# Patient Record
Sex: MALE | Race: BLACK OR AFRICAN AMERICAN | HISPANIC OR LATINO | Employment: FULL TIME | URBAN - METROPOLITAN AREA
[De-identification: names, ages, dates, MRNs, and addresses within clinical notes are randomized per-mention and may not be internally consistent; named-entity substitution may affect disease eponyms.]

---

## 2018-04-11 ENCOUNTER — CONVERSION ENCOUNTER (OUTPATIENT)
Dept: FAMILY MEDICINE CLINIC | Facility: CLINIC | Age: 48
End: 2018-04-11

## 2018-04-11 ENCOUNTER — OFFICE VISIT CONVERTED (OUTPATIENT)
Dept: FAMILY MEDICINE CLINIC | Facility: CLINIC | Age: 48
End: 2018-04-11
Attending: NURSE PRACTITIONER

## 2018-04-17 ENCOUNTER — OFFICE VISIT CONVERTED (OUTPATIENT)
Dept: CARDIOLOGY | Facility: CLINIC | Age: 48
End: 2018-04-17
Attending: INTERNAL MEDICINE

## 2018-05-04 ENCOUNTER — CONVERSION ENCOUNTER (OUTPATIENT)
Dept: CARDIOLOGY | Facility: CLINIC | Age: 48
End: 2018-05-04
Attending: INTERNAL MEDICINE

## 2018-05-04 ENCOUNTER — CONVERSION ENCOUNTER (OUTPATIENT)
Dept: CARDIOLOGY | Facility: CLINIC | Age: 48
End: 2018-05-04

## 2018-05-17 ENCOUNTER — OFFICE VISIT CONVERTED (OUTPATIENT)
Dept: PULMONOLOGY | Facility: CLINIC | Age: 48
End: 2018-05-17
Attending: INTERNAL MEDICINE

## 2018-06-07 ENCOUNTER — OFFICE VISIT CONVERTED (OUTPATIENT)
Dept: CARDIOLOGY | Facility: CLINIC | Age: 48
End: 2018-06-07
Attending: INTERNAL MEDICINE

## 2018-07-19 ENCOUNTER — CONVERSION ENCOUNTER (OUTPATIENT)
Dept: CARDIOLOGY | Facility: CLINIC | Age: 48
End: 2018-07-19

## 2018-07-19 ENCOUNTER — OFFICE VISIT CONVERTED (OUTPATIENT)
Dept: CARDIOLOGY | Facility: CLINIC | Age: 48
End: 2018-07-19
Attending: INTERNAL MEDICINE

## 2018-08-09 ENCOUNTER — OFFICE VISIT CONVERTED (OUTPATIENT)
Dept: PULMONOLOGY | Facility: CLINIC | Age: 48
End: 2018-08-09
Attending: INTERNAL MEDICINE

## 2018-08-20 ENCOUNTER — OFFICE VISIT CONVERTED (OUTPATIENT)
Dept: FAMILY MEDICINE CLINIC | Facility: CLINIC | Age: 48
End: 2018-08-20
Attending: NURSE PRACTITIONER

## 2018-09-21 ENCOUNTER — OFFICE VISIT CONVERTED (OUTPATIENT)
Dept: CARDIOLOGY | Facility: CLINIC | Age: 48
End: 2018-09-21
Attending: INTERNAL MEDICINE

## 2018-10-12 ENCOUNTER — CONVERSION ENCOUNTER (OUTPATIENT)
Dept: FAMILY MEDICINE CLINIC | Facility: CLINIC | Age: 48
End: 2018-10-12

## 2018-10-12 ENCOUNTER — OFFICE VISIT CONVERTED (OUTPATIENT)
Dept: FAMILY MEDICINE CLINIC | Facility: CLINIC | Age: 48
End: 2018-10-12
Attending: NURSE PRACTITIONER

## 2018-11-06 ENCOUNTER — CONVERSION ENCOUNTER (OUTPATIENT)
Dept: GASTROENTEROLOGY | Facility: CLINIC | Age: 48
End: 2018-11-06

## 2018-11-06 ENCOUNTER — OFFICE VISIT CONVERTED (OUTPATIENT)
Dept: GASTROENTEROLOGY | Facility: CLINIC | Age: 48
End: 2018-11-06
Attending: NURSE PRACTITIONER

## 2018-11-08 ENCOUNTER — OFFICE VISIT CONVERTED (OUTPATIENT)
Dept: CARDIOLOGY | Facility: CLINIC | Age: 48
End: 2018-11-08
Attending: INTERNAL MEDICINE

## 2018-11-28 ENCOUNTER — OFFICE VISIT CONVERTED (OUTPATIENT)
Dept: PULMONOLOGY | Facility: CLINIC | Age: 48
End: 2018-11-28
Attending: INTERNAL MEDICINE

## 2018-12-11 ENCOUNTER — OFFICE VISIT CONVERTED (OUTPATIENT)
Dept: CARDIOLOGY | Facility: CLINIC | Age: 48
End: 2018-12-11
Attending: INTERNAL MEDICINE

## 2019-01-11 ENCOUNTER — HOSPITAL ENCOUNTER (OUTPATIENT)
Dept: GASTROENTEROLOGY | Facility: HOSPITAL | Age: 49
Setting detail: HOSPITAL OUTPATIENT SURGERY
Discharge: HOME | End: 2019-01-11
Attending: INTERNAL MEDICINE

## 2019-01-11 LAB — GLUCOSE BLD-MCNC: 96 MG/DL (ref 70–99)

## 2019-01-12 ENCOUNTER — HOSPITAL ENCOUNTER (OUTPATIENT)
Dept: OTHER | Facility: HOSPITAL | Age: 49
Discharge: HOME | End: 2019-01-12
Attending: INTERNAL MEDICINE

## 2019-02-08 ENCOUNTER — OFFICE VISIT CONVERTED (OUTPATIENT)
Dept: FAMILY MEDICINE CLINIC | Facility: CLINIC | Age: 49
End: 2019-02-08
Attending: NURSE PRACTITIONER

## 2019-02-11 ENCOUNTER — HOSPITAL ENCOUNTER (OUTPATIENT)
Dept: LAB | Facility: HOSPITAL | Age: 49
Discharge: HOME | End: 2019-02-11
Attending: NURSE PRACTITIONER

## 2019-02-11 LAB
ALBUMIN SERPL-MCNC: 3.7 G/DL (ref 3.5–5)
ALBUMIN/GLOB SERPL: 0.8 {RATIO} (ref 1.4–2.6)
ALP SERPL-CCNC: 66 U/L (ref 53–128)
ALT SERPL-CCNC: 94 U/L (ref 10–40)
ANION GAP SERPL CALC-SCNC: 15 MMOL/L (ref 8–19)
AST SERPL-CCNC: 56 U/L (ref 15–50)
BASOPHILS # BLD AUTO: 0.01 10*3/UL (ref 0–0.2)
BASOPHILS NFR BLD AUTO: 0.17 % (ref 0–3)
BILIRUB SERPL-MCNC: 0.4 MG/DL (ref 0.2–1.3)
BUN SERPL-MCNC: 13 MG/DL (ref 5–25)
BUN/CREAT SERPL: 11 {RATIO} (ref 6–20)
CALCIUM SERPL-MCNC: 9.9 MG/DL (ref 8.7–10.4)
CHLORIDE SERPL-SCNC: 100 MMOL/L (ref 99–111)
CHOLEST SERPL-MCNC: 121 MG/DL (ref 107–200)
CHOLEST/HDLC SERPL: 3.9 {RATIO} (ref 3–6)
CONV CO2: 28 MMOL/L (ref 22–32)
CONV TOTAL PROTEIN: 8.2 G/DL (ref 6.3–8.2)
CREAT UR-MCNC: 1.21 MG/DL (ref 0.7–1.2)
EOSINOPHIL # BLD AUTO: 0.17 10*3/UL (ref 0–0.7)
EOSINOPHIL # BLD AUTO: 3.42 % (ref 0–7)
ERYTHROCYTE [DISTWIDTH] IN BLOOD BY AUTOMATED COUNT: 13.1 % (ref 11.5–14.5)
EST. AVERAGE GLUCOSE BLD GHB EST-MCNC: 140 MG/DL
GFR SERPLBLD BASED ON 1.73 SQ M-ARVRAT: >60 ML/MIN/{1.73_M2}
GLOBULIN UR ELPH-MCNC: 4.5 G/DL (ref 2–3.5)
GLUCOSE SERPL-MCNC: 97 MG/DL (ref 70–99)
HBA1C MFR BLD: 11.1 G/DL (ref 14–18)
HBA1C MFR BLD: 6.5 % (ref 3.5–5.7)
HCT VFR BLD AUTO: 34.9 % (ref 42–52)
HDLC SERPL-MCNC: 31 MG/DL (ref 40–60)
INR PPP: 2.04 (ref 2–3)
IRON SATN MFR SERPL: 14 % (ref 20–55)
IRON SERPL-MCNC: 38 UG/DL (ref 70–180)
LDLC SERPL CALC-MCNC: 72 MG/DL (ref 70–100)
LYMPHOCYTES # BLD AUTO: 1.31 10*3/UL (ref 1–5)
MCH RBC QN AUTO: 22.9 PG (ref 27–31)
MCHC RBC AUTO-ENTMCNC: 31.8 G/DL (ref 33–37)
MCV RBC AUTO: 72.2 FL (ref 80–96)
MONOCYTES # BLD AUTO: 0.53 10*3/UL (ref 0.2–1.2)
MONOCYTES NFR BLD AUTO: 10.8 % (ref 3–10)
NEUTROPHILS # BLD AUTO: 2.87 10*3/UL (ref 2–8)
NEUTROPHILS NFR BLD AUTO: 58.9 % (ref 30–85)
NRBC BLD AUTO-RTO: 0 % (ref 0–0.01)
OSMOLALITY SERPL CALC.SUM OF ELEC: 288 MOSM/KG (ref 273–304)
PLATELET # BLD AUTO: 526 10*3/UL (ref 130–400)
PMV BLD AUTO: 7.5 FL (ref 7.4–10.4)
POTASSIUM SERPL-SCNC: 4.3 MMOL/L (ref 3.5–5.3)
PROTHROMBIN TIME: 19.3 S (ref 9.4–12)
RBC # BLD AUTO: 4.83 10*6/UL (ref 4.7–6.1)
SODIUM SERPL-SCNC: 139 MMOL/L (ref 135–147)
T4 FREE SERPL-MCNC: 1.6 NG/DL (ref 0.9–1.8)
TIBC SERPL-MCNC: 272 UG/DL (ref 245–450)
TRANSFERRIN SERPL-MCNC: 190 MG/DL (ref 215–365)
TRIGL SERPL-MCNC: 89 MG/DL (ref 40–150)
TSH SERPL-ACNC: 1.07 M[IU]/L (ref 0.27–4.2)
VARIANT LYMPHS NFR BLD MANUAL: 26.7 % (ref 20–45)
VLDLC SERPL-MCNC: 18 MG/DL (ref 5–37)
WBC # BLD AUTO: 4.88 10*3/UL (ref 4.8–10.8)

## 2019-02-14 ENCOUNTER — OFFICE VISIT CONVERTED (OUTPATIENT)
Dept: CARDIOLOGY | Facility: CLINIC | Age: 49
End: 2019-02-14
Attending: INTERNAL MEDICINE

## 2019-02-19 ENCOUNTER — HOSPITAL ENCOUNTER (OUTPATIENT)
Dept: LAB | Facility: HOSPITAL | Age: 49
Discharge: HOME | End: 2019-02-19
Attending: NURSE PRACTITIONER

## 2019-02-19 ENCOUNTER — OFFICE VISIT CONVERTED (OUTPATIENT)
Dept: GASTROENTEROLOGY | Facility: CLINIC | Age: 49
End: 2019-02-19
Attending: NURSE PRACTITIONER

## 2019-02-19 LAB
ALBUMIN SERPL-MCNC: 3.8 G/DL (ref 3.5–5)
ALP SERPL-CCNC: 62 U/L (ref 53–128)
ALT SERPL-CCNC: 43 U/L (ref 10–40)
AST SERPL-CCNC: 26 U/L (ref 15–50)
BILIRUB SERPL-MCNC: 0.48 MG/DL (ref 0.2–1.3)
CONV BILI, CONJUGATED: <0.2 MG/DL (ref 0–0.6)
CONV TOTAL PROTEIN: 8.1 G/DL (ref 6.3–8.2)
CONV UNCONJUGATED BILIRUBIN: 0.3 MG/DL (ref 0–1.1)

## 2019-02-21 LAB — CONV CELIAC DISEASE AB-IGA: 445 MG/DL (ref 68–408)

## 2019-02-22 LAB — TTG IGA SER-ACNC: 1 U/ML (ref 0–3)

## 2019-02-28 ENCOUNTER — PROCEDURE VISIT CONVERTED (OUTPATIENT)
Dept: GASTROENTEROLOGY | Facility: CLINIC | Age: 49
End: 2019-02-28
Attending: NURSE PRACTITIONER

## 2019-03-12 ENCOUNTER — HOSPITAL ENCOUNTER (OUTPATIENT)
Dept: LAB | Facility: HOSPITAL | Age: 49
Discharge: HOME | End: 2019-03-12
Attending: NURSE PRACTITIONER

## 2019-03-12 LAB
ALBUMIN SERPL-MCNC: 3.8 G/DL (ref 3.5–5)
ALBUMIN/GLOB SERPL: 1 {RATIO} (ref 1.4–2.6)
ALP SERPL-CCNC: 60 U/L (ref 53–128)
ALT SERPL-CCNC: 33 U/L (ref 10–40)
ANION GAP SERPL CALC-SCNC: 13 MMOL/L (ref 8–19)
AST SERPL-CCNC: 24 U/L (ref 15–50)
BILIRUB SERPL-MCNC: 0.43 MG/DL (ref 0.2–1.3)
BUN SERPL-MCNC: 10 MG/DL (ref 5–25)
BUN/CREAT SERPL: 9 {RATIO} (ref 6–20)
CALCIUM SERPL-MCNC: 9.1 MG/DL (ref 8.7–10.4)
CHLORIDE SERPL-SCNC: 101 MMOL/L (ref 99–111)
CONV CO2: 28 MMOL/L (ref 22–32)
CONV TOTAL PROTEIN: 7.5 G/DL (ref 6.3–8.2)
CREAT UR-MCNC: 1.13 MG/DL (ref 0.7–1.2)
GFR SERPLBLD BASED ON 1.73 SQ M-ARVRAT: >60 ML/MIN/{1.73_M2}
GLOBULIN UR ELPH-MCNC: 3.7 G/DL (ref 2–3.5)
GLUCOSE SERPL-MCNC: 124 MG/DL (ref 70–99)
INR PPP: 1.33 (ref 2–3)
OSMOLALITY SERPL CALC.SUM OF ELEC: 288 MOSM/KG (ref 273–304)
POTASSIUM SERPL-SCNC: 3.4 MMOL/L (ref 3.5–5.3)
PROTHROMBIN TIME: 13.2 S (ref 9.4–12)
SODIUM SERPL-SCNC: 139 MMOL/L (ref 135–147)

## 2019-03-14 ENCOUNTER — HOSPITAL ENCOUNTER (OUTPATIENT)
Dept: CARDIAC REHAB | Facility: HOSPITAL | Age: 49
Setting detail: RECURRING SERIES
Discharge: HOME | End: 2019-07-10
Attending: INTERNAL MEDICINE

## 2019-03-14 ENCOUNTER — HOSPITAL ENCOUNTER (OUTPATIENT)
Dept: INFUSION THERAPY | Facility: HOSPITAL | Age: 49
Discharge: HOME | End: 2019-03-14
Attending: INTERNAL MEDICINE

## 2019-03-15 ENCOUNTER — HOSPITAL ENCOUNTER (OUTPATIENT)
Dept: OTHER | Facility: HOSPITAL | Age: 49
Discharge: HOME | End: 2019-03-15
Attending: INTERNAL MEDICINE

## 2019-03-15 ENCOUNTER — HOSPITAL ENCOUNTER (OUTPATIENT)
Dept: INFUSION THERAPY | Facility: HOSPITAL | Age: 49
Setting detail: RECURRING SERIES
Discharge: HOME | End: 2019-03-31
Attending: SPECIALIST

## 2019-03-15 LAB
INR PPP: 1.5 (ref 2–3)
PROTHROMBIN TIME: 14.7 S (ref 9.4–12)

## 2019-03-18 ENCOUNTER — HOSPITAL ENCOUNTER (OUTPATIENT)
Dept: LAB | Facility: HOSPITAL | Age: 49
Discharge: HOME | End: 2019-03-18
Attending: INTERNAL MEDICINE

## 2019-03-18 LAB
INR PPP: 1.79 (ref 2–3)
PROTHROMBIN TIME: 17.2 S (ref 9.4–12)

## 2019-03-21 ENCOUNTER — HOSPITAL ENCOUNTER (OUTPATIENT)
Dept: LAB | Facility: HOSPITAL | Age: 49
Discharge: HOME | End: 2019-03-21
Attending: INTERNAL MEDICINE

## 2019-03-21 LAB
INR PPP: 2.25 (ref 2–3)
PROTHROMBIN TIME: 21.2 S (ref 9.4–12)

## 2019-03-28 ENCOUNTER — CONVERSION ENCOUNTER (OUTPATIENT)
Dept: CARDIOLOGY | Facility: CLINIC | Age: 49
End: 2019-03-28

## 2019-03-28 ENCOUNTER — OFFICE VISIT CONVERTED (OUTPATIENT)
Dept: CARDIOLOGY | Facility: CLINIC | Age: 49
End: 2019-03-28
Attending: INTERNAL MEDICINE

## 2019-03-28 ENCOUNTER — HOSPITAL ENCOUNTER (OUTPATIENT)
Dept: LAB | Facility: HOSPITAL | Age: 49
Discharge: HOME | End: 2019-03-28
Attending: INTERNAL MEDICINE

## 2019-03-28 LAB
INR PPP: 1.16 (ref 2–3)
PROTHROMBIN TIME: 11.7 S (ref 9.4–12)

## 2019-04-01 ENCOUNTER — HOSPITAL ENCOUNTER (OUTPATIENT)
Dept: LAB | Facility: HOSPITAL | Age: 49
Discharge: HOME | End: 2019-04-01
Attending: INTERNAL MEDICINE

## 2019-04-01 LAB
INR PPP: 2.2 (ref 2–3)
PROTHROMBIN TIME: 20.7 S (ref 9.4–12)

## 2019-04-05 ENCOUNTER — HOSPITAL ENCOUNTER (OUTPATIENT)
Dept: LAB | Facility: HOSPITAL | Age: 49
Discharge: HOME | End: 2019-04-05
Attending: INTERNAL MEDICINE

## 2019-04-05 LAB
INR PPP: 2.67 (ref 2–3)
PROTHROMBIN TIME: 24.9 S (ref 9.4–12)

## 2019-04-12 ENCOUNTER — HOSPITAL ENCOUNTER (OUTPATIENT)
Dept: LAB | Facility: HOSPITAL | Age: 49
Discharge: HOME | End: 2019-04-12
Attending: INTERNAL MEDICINE

## 2019-04-12 LAB
ANION GAP SERPL CALC-SCNC: 17 MMOL/L (ref 8–19)
BUN SERPL-MCNC: 11 MG/DL (ref 5–25)
BUN/CREAT SERPL: 9 {RATIO} (ref 6–20)
CALCIUM SERPL-MCNC: 8.9 MG/DL (ref 8.7–10.4)
CHLORIDE SERPL-SCNC: 105 MMOL/L (ref 99–111)
CONV CO2: 23 MMOL/L (ref 22–32)
CREAT UR-MCNC: 1.17 MG/DL (ref 0.7–1.2)
GFR SERPLBLD BASED ON 1.73 SQ M-ARVRAT: >60 ML/MIN/{1.73_M2}
GLUCOSE SERPL-MCNC: 95 MG/DL (ref 70–99)
INR PPP: 2.94 (ref 2–3)
OSMOLALITY SERPL CALC.SUM OF ELEC: 291 MOSM/KG (ref 273–304)
POTASSIUM SERPL-SCNC: 3.6 MMOL/L (ref 3.5–5.3)
PROTHROMBIN TIME: 27.3 S (ref 9.4–12)
SODIUM SERPL-SCNC: 141 MMOL/L (ref 135–147)

## 2019-04-26 ENCOUNTER — HOSPITAL ENCOUNTER (OUTPATIENT)
Dept: LAB | Facility: HOSPITAL | Age: 49
Discharge: HOME | End: 2019-04-26
Attending: INTERNAL MEDICINE

## 2019-04-26 ENCOUNTER — CONVERSION ENCOUNTER (OUTPATIENT)
Dept: FAMILY MEDICINE CLINIC | Facility: CLINIC | Age: 49
End: 2019-04-26

## 2019-04-26 ENCOUNTER — OFFICE VISIT CONVERTED (OUTPATIENT)
Dept: FAMILY MEDICINE CLINIC | Facility: CLINIC | Age: 49
End: 2019-04-26
Attending: NURSE PRACTITIONER

## 2019-04-26 LAB
INR PPP: 2.85 (ref 2–3)
PROTHROMBIN TIME: 26.5 S (ref 9.4–12)

## 2019-05-13 ENCOUNTER — HOSPITAL ENCOUNTER (OUTPATIENT)
Dept: LAB | Facility: HOSPITAL | Age: 49
Discharge: HOME | End: 2019-05-13
Attending: INTERNAL MEDICINE

## 2019-05-13 LAB
INR PPP: 2.44 (ref 2–3)
PROTHROMBIN TIME: 22.9 S (ref 9.4–12)

## 2019-05-16 ENCOUNTER — OFFICE VISIT CONVERTED (OUTPATIENT)
Dept: OTOLARYNGOLOGY | Facility: CLINIC | Age: 49
End: 2019-05-16
Attending: OTOLARYNGOLOGY

## 2019-05-29 ENCOUNTER — HOSPITAL ENCOUNTER (OUTPATIENT)
Dept: LAB | Facility: HOSPITAL | Age: 49
Discharge: HOME | End: 2019-05-29
Attending: INTERNAL MEDICINE

## 2019-05-29 LAB
INR PPP: 3.27 (ref 2–3)
PROTHROMBIN TIME: 30.4 S (ref 9.4–12)

## 2019-06-14 ENCOUNTER — HOSPITAL ENCOUNTER (OUTPATIENT)
Dept: LAB | Facility: HOSPITAL | Age: 49
Discharge: HOME | End: 2019-06-14
Attending: INTERNAL MEDICINE

## 2019-06-14 LAB
INR PPP: 3.85 (ref 2–3)
PROTHROMBIN TIME: 35.7 S (ref 9.4–12)

## 2019-06-28 ENCOUNTER — HOSPITAL ENCOUNTER (OUTPATIENT)
Dept: LAB | Facility: HOSPITAL | Age: 49
Discharge: HOME | End: 2019-06-28
Attending: NURSE PRACTITIONER

## 2019-06-28 LAB
BASOPHILS # BLD AUTO: 0.02 10*3/UL (ref 0–0.2)
BASOPHILS NFR BLD AUTO: 0.4 % (ref 0–3)
CONV ABS IMM GRAN: 0.01 10*3/UL (ref 0–0.2)
CONV IMMATURE GRAN: 0.2 % (ref 0–1.8)
DEPRECATED RDW RBC AUTO: 43 FL (ref 35.1–43.9)
EOSINOPHIL # BLD AUTO: 0.08 10*3/UL (ref 0–0.7)
EOSINOPHIL # BLD AUTO: 1.6 % (ref 0–7)
ERYTHROCYTE [DISTWIDTH] IN BLOOD BY AUTOMATED COUNT: 17.4 % (ref 11.6–14.4)
FERRITIN SERPL-MCNC: 47 NG/ML (ref 30–300)
HBA1C MFR BLD: 12.9 G/DL (ref 14–18)
HCT VFR BLD AUTO: 44.6 % (ref 42–52)
INR PPP: 3.34 (ref 2–3)
IRON SATN MFR SERPL: 19 % (ref 20–55)
IRON SERPL-MCNC: 68 UG/DL (ref 70–180)
LYMPHOCYTES # BLD AUTO: 1.85 10*3/UL (ref 1–5)
MCH RBC QN AUTO: 21.1 PG (ref 27–31)
MCHC RBC AUTO-ENTMCNC: 28.9 G/DL (ref 33–37)
MCV RBC AUTO: 73.1 FL (ref 80–96)
MONOCYTES # BLD AUTO: 0.79 10*3/UL (ref 0.2–1.2)
MONOCYTES NFR BLD AUTO: 15.9 % (ref 3–10)
NEUTROPHILS # BLD AUTO: 2.23 10*3/UL (ref 2–8)
NEUTROPHILS NFR BLD AUTO: 44.8 % (ref 30–85)
NRBC CBCN: 0 % (ref 0–0.7)
PLATELET # BLD AUTO: 341 10*3/UL (ref 130–400)
PMV BLD AUTO: 10.2 FL (ref 9.4–12.4)
PROTHROMBIN TIME: 31 S (ref 9.4–12)
RBC # BLD AUTO: 6.1 10*6/UL (ref 4.7–6.1)
TIBC SERPL-MCNC: 350 UG/DL (ref 245–450)
TRANSFERRIN SERPL-MCNC: 245 MG/DL (ref 215–365)
VARIANT LYMPHS NFR BLD MANUAL: 37.1 % (ref 20–45)
WBC # BLD AUTO: 4.98 10*3/UL (ref 4.8–10.8)

## 2019-07-01 ENCOUNTER — OFFICE VISIT CONVERTED (OUTPATIENT)
Dept: GASTROENTEROLOGY | Facility: CLINIC | Age: 49
End: 2019-07-01
Attending: NURSE PRACTITIONER

## 2019-07-12 ENCOUNTER — HOSPITAL ENCOUNTER (OUTPATIENT)
Dept: LAB | Facility: HOSPITAL | Age: 49
Discharge: HOME | End: 2019-07-12
Attending: INTERNAL MEDICINE

## 2019-07-12 LAB
INR PPP: 3.17 (ref 2–3)
PROTHROMBIN TIME: 29.4 S (ref 9.4–12)

## 2019-08-16 ENCOUNTER — HOSPITAL ENCOUNTER (OUTPATIENT)
Dept: LAB | Facility: HOSPITAL | Age: 49
Discharge: HOME | End: 2019-08-16
Attending: INTERNAL MEDICINE

## 2019-08-16 LAB
INR PPP: 2.62 (ref 2–3)
PROTHROMBIN TIME: 24.5 S (ref 9.4–12)

## 2019-08-28 ENCOUNTER — OFFICE VISIT CONVERTED (OUTPATIENT)
Dept: CARDIOLOGY | Facility: CLINIC | Age: 49
End: 2019-08-28
Attending: INTERNAL MEDICINE

## 2019-08-28 ENCOUNTER — CONVERSION ENCOUNTER (OUTPATIENT)
Dept: CARDIOLOGY | Facility: CLINIC | Age: 49
End: 2019-08-28

## 2019-08-30 ENCOUNTER — HOSPITAL ENCOUNTER (OUTPATIENT)
Dept: LAB | Facility: HOSPITAL | Age: 49
Discharge: HOME | End: 2019-08-30
Attending: INTERNAL MEDICINE

## 2019-08-30 LAB
INR PPP: 4.27 (ref 2–3)
PROTHROMBIN TIME: 39.5 S (ref 9.4–12)

## 2019-09-06 ENCOUNTER — HOSPITAL ENCOUNTER (OUTPATIENT)
Dept: LAB | Facility: HOSPITAL | Age: 49
Discharge: HOME | End: 2019-09-06
Attending: INTERNAL MEDICINE

## 2019-09-06 LAB
INR PPP: 2.78 (ref 2–3)
PROTHROMBIN TIME: 25.9 S (ref 9.4–12)

## 2019-09-20 ENCOUNTER — HOSPITAL ENCOUNTER (OUTPATIENT)
Dept: LAB | Facility: HOSPITAL | Age: 49
Discharge: HOME | End: 2019-09-20
Attending: INTERNAL MEDICINE

## 2019-09-20 LAB
INR PPP: 2.91 (ref 2–3)
PROTHROMBIN TIME: 27.1 S (ref 9.4–12)

## 2019-10-07 ENCOUNTER — OFFICE VISIT CONVERTED (OUTPATIENT)
Dept: FAMILY MEDICINE CLINIC | Facility: CLINIC | Age: 49
End: 2019-10-07
Attending: NURSE PRACTITIONER

## 2019-10-07 ENCOUNTER — CONVERSION ENCOUNTER (OUTPATIENT)
Dept: FAMILY MEDICINE CLINIC | Facility: CLINIC | Age: 49
End: 2019-10-07

## 2019-10-11 ENCOUNTER — HOSPITAL ENCOUNTER (OUTPATIENT)
Dept: LAB | Facility: HOSPITAL | Age: 49
Discharge: HOME | End: 2019-10-11
Attending: INTERNAL MEDICINE

## 2019-10-11 LAB
INR PPP: 4 (ref 2–3)
PROTHROMBIN TIME: 38.8 S (ref 9.4–12)

## 2019-10-15 ENCOUNTER — HOSPITAL ENCOUNTER (OUTPATIENT)
Dept: GENERAL RADIOLOGY | Facility: HOSPITAL | Age: 49
Discharge: HOME | End: 2019-10-15
Attending: NURSE PRACTITIONER

## 2019-10-28 ENCOUNTER — OFFICE VISIT CONVERTED (OUTPATIENT)
Dept: FAMILY MEDICINE CLINIC | Facility: CLINIC | Age: 49
End: 2019-10-28
Attending: NURSE PRACTITIONER

## 2019-11-01 ENCOUNTER — HOSPITAL ENCOUNTER (OUTPATIENT)
Dept: LAB | Facility: HOSPITAL | Age: 49
Discharge: HOME | End: 2019-11-01
Attending: INTERNAL MEDICINE

## 2019-11-01 LAB
EST. AVERAGE GLUCOSE BLD GHB EST-MCNC: 131 MG/DL
HBA1C MFR BLD: 6.2 % (ref 3.5–5.7)
INR PPP: 2.41 (ref 2–3)
PROTHROMBIN TIME: 23.6 S (ref 9.4–12)

## 2019-11-20 ENCOUNTER — HOSPITAL ENCOUNTER (OUTPATIENT)
Dept: PHYSICAL THERAPY | Facility: CLINIC | Age: 49
Setting detail: RECURRING SERIES
Discharge: STILL ADMITTED OR ADMINISTRATIVE | End: 2020-03-04
Attending: NURSE PRACTITIONER

## 2019-11-20 ENCOUNTER — HOSPITAL ENCOUNTER (OUTPATIENT)
Dept: GENERAL RADIOLOGY | Facility: HOSPITAL | Age: 49
Discharge: HOME | End: 2019-11-20
Attending: PHYSICIAN ASSISTANT

## 2019-11-20 ENCOUNTER — OFFICE VISIT CONVERTED (OUTPATIENT)
Dept: NEUROSURGERY | Facility: CLINIC | Age: 49
End: 2019-11-20
Attending: PHYSICIAN ASSISTANT

## 2019-12-18 ENCOUNTER — OFFICE VISIT CONVERTED (OUTPATIENT)
Dept: PULMONOLOGY | Facility: CLINIC | Age: 49
End: 2019-12-18
Attending: NURSE PRACTITIONER

## 2019-12-20 ENCOUNTER — HOSPITAL ENCOUNTER (OUTPATIENT)
Dept: GENERAL RADIOLOGY | Facility: HOSPITAL | Age: 49
Discharge: HOME | End: 2019-12-20
Attending: NURSE PRACTITIONER

## 2019-12-20 ENCOUNTER — HOSPITAL ENCOUNTER (OUTPATIENT)
Dept: LAB | Facility: HOSPITAL | Age: 49
Discharge: HOME | End: 2019-12-20
Attending: INTERNAL MEDICINE

## 2019-12-20 LAB
INR PPP: 2.34 (ref 2–3)
PROTHROMBIN TIME: 23 S (ref 9.4–12)

## 2020-01-24 ENCOUNTER — HOSPITAL ENCOUNTER (OUTPATIENT)
Dept: LAB | Facility: HOSPITAL | Age: 50
Discharge: HOME | End: 2020-01-24
Attending: INTERNAL MEDICINE

## 2020-01-24 LAB
INR PPP: 2.28 (ref 2–3)
PROTHROMBIN TIME: 22.4 S (ref 9.4–12)

## 2020-02-03 ENCOUNTER — HOSPITAL ENCOUNTER (OUTPATIENT)
Dept: LAB | Facility: HOSPITAL | Age: 50
Discharge: HOME | End: 2020-02-03
Attending: INTERNAL MEDICINE

## 2020-02-03 LAB
INR PPP: 2.7 (ref 2–3)
PROTHROMBIN TIME: 26.3 S (ref 9.4–12)

## 2020-02-19 ENCOUNTER — HOSPITAL ENCOUNTER (OUTPATIENT)
Dept: LAB | Facility: HOSPITAL | Age: 50
Discharge: HOME | End: 2020-02-19
Attending: INTERNAL MEDICINE

## 2020-02-19 LAB
INR PPP: 2.97 (ref 2–3)
PROTHROMBIN TIME: 28.8 S (ref 9.4–12)

## 2020-02-26 ENCOUNTER — OFFICE VISIT CONVERTED (OUTPATIENT)
Dept: NEUROSURGERY | Facility: CLINIC | Age: 50
End: 2020-02-26
Attending: PHYSICIAN ASSISTANT

## 2020-03-03 ENCOUNTER — HOSPITAL ENCOUNTER (OUTPATIENT)
Dept: LAB | Facility: HOSPITAL | Age: 50
Discharge: HOME | End: 2020-03-03
Attending: INTERNAL MEDICINE

## 2020-03-03 LAB
INR PPP: 1.05 (ref 2–3)
PROTHROMBIN TIME: 11.3 S (ref 9.4–12)

## 2020-03-06 ENCOUNTER — HOSPITAL ENCOUNTER (OUTPATIENT)
Dept: LAB | Facility: HOSPITAL | Age: 50
Discharge: HOME | End: 2020-03-06
Attending: INTERNAL MEDICINE

## 2020-03-06 LAB
INR PPP: 1.71 (ref 2–3)
PROTHROMBIN TIME: 17.1 S (ref 9.4–12)

## 2020-03-09 ENCOUNTER — HOSPITAL ENCOUNTER (OUTPATIENT)
Dept: LAB | Facility: HOSPITAL | Age: 50
Discharge: HOME | End: 2020-03-09
Attending: INTERNAL MEDICINE

## 2020-03-09 LAB
INR PPP: 3.42 (ref 2–3)
PROTHROMBIN TIME: 33.1 S (ref 9.4–12)

## 2020-03-18 ENCOUNTER — HOSPITAL ENCOUNTER (OUTPATIENT)
Dept: PHYSICAL THERAPY | Facility: CLINIC | Age: 50
Setting detail: RECURRING SERIES
Discharge: HOME | End: 2020-07-08
Attending: NURSE PRACTITIONER

## 2020-03-19 ENCOUNTER — TELEMEDICINE CONVERTED (OUTPATIENT)
Dept: CARDIOLOGY | Facility: CLINIC | Age: 50
End: 2020-03-19
Attending: INTERNAL MEDICINE

## 2020-03-23 ENCOUNTER — HOSPITAL ENCOUNTER (OUTPATIENT)
Dept: LAB | Facility: HOSPITAL | Age: 50
Discharge: HOME | End: 2020-03-23
Attending: INTERNAL MEDICINE

## 2020-03-23 LAB
INR PPP: 4.05 (ref 2–3)
PROTHROMBIN TIME: 39.3 S (ref 9.4–12)

## 2020-04-07 ENCOUNTER — OFFICE VISIT CONVERTED (OUTPATIENT)
Dept: FAMILY MEDICINE CLINIC | Facility: CLINIC | Age: 50
End: 2020-04-07
Attending: NURSE PRACTITIONER

## 2020-04-07 ENCOUNTER — HOSPITAL ENCOUNTER (OUTPATIENT)
Dept: LAB | Facility: HOSPITAL | Age: 50
Discharge: HOME | End: 2020-04-07
Attending: INTERNAL MEDICINE

## 2020-04-07 LAB
INR PPP: 4.78 (ref 2–3)
PROTHROMBIN TIME: 46.4 S (ref 9.4–12)

## 2020-04-17 ENCOUNTER — HOSPITAL ENCOUNTER (OUTPATIENT)
Dept: LAB | Facility: HOSPITAL | Age: 50
Discharge: HOME | End: 2020-04-17
Attending: INTERNAL MEDICINE

## 2020-04-17 LAB
INR PPP: 2.99 (ref 2–3)
PROTHROMBIN TIME: 29.1 S (ref 9.4–12)

## 2020-04-28 ENCOUNTER — OFFICE VISIT CONVERTED (OUTPATIENT)
Dept: FAMILY MEDICINE CLINIC | Facility: CLINIC | Age: 50
End: 2020-04-28
Attending: NURSE PRACTITIONER

## 2020-05-18 ENCOUNTER — HOSPITAL ENCOUNTER (OUTPATIENT)
Dept: LAB | Facility: HOSPITAL | Age: 50
Discharge: HOME | End: 2020-05-18
Attending: NURSE PRACTITIONER

## 2020-05-18 LAB
ALBUMIN SERPL-MCNC: 4 G/DL (ref 3.5–5)
ALBUMIN/GLOB SERPL: 1.2 {RATIO} (ref 1.4–2.6)
ALP SERPL-CCNC: 43 U/L (ref 53–128)
ALT SERPL-CCNC: 18 U/L (ref 10–40)
ANION GAP SERPL CALC-SCNC: 13 MMOL/L (ref 8–19)
AST SERPL-CCNC: 31 U/L (ref 15–50)
BILIRUB SERPL-MCNC: 0.66 MG/DL (ref 0.2–1.3)
BUN SERPL-MCNC: 7 MG/DL (ref 5–25)
BUN/CREAT SERPL: 6 {RATIO} (ref 6–20)
CALCIUM SERPL-MCNC: 8.8 MG/DL (ref 8.7–10.4)
CHLORIDE SERPL-SCNC: 105 MMOL/L (ref 99–111)
CHOLEST SERPL-MCNC: 177 MG/DL (ref 107–200)
CHOLEST/HDLC SERPL: 4.3 {RATIO} (ref 3–6)
CONV CO2: 21 MMOL/L (ref 22–32)
CONV CREATININE URINE, RANDOM: 283 MG/DL (ref 10–300)
CONV MICROALBUM.,U,RANDOM: <12 MG/L (ref 0–20)
CONV TOTAL PROTEIN: 7.3 G/DL (ref 6.3–8.2)
CREAT UR-MCNC: 1.12 MG/DL (ref 0.7–1.2)
EST. AVERAGE GLUCOSE BLD GHB EST-MCNC: 134 MG/DL
GFR SERPLBLD BASED ON 1.73 SQ M-ARVRAT: >60 ML/MIN/{1.73_M2}
GLOBULIN UR ELPH-MCNC: 3.3 G/DL (ref 2–3.5)
GLUCOSE SERPL-MCNC: 97 MG/DL (ref 70–99)
HBA1C MFR BLD: 6.3 % (ref 3.5–5.7)
HDLC SERPL-MCNC: 41 MG/DL (ref 40–60)
INR PPP: 2.74 (ref 2–3)
LDLC SERPL CALC-MCNC: 118 MG/DL (ref 70–100)
MICROALBUMIN/CREAT UR: 4.2 MG/G{CRE} (ref 0–25)
OSMOLALITY SERPL CALC.SUM OF ELEC: 278 MOSM/KG (ref 273–304)
POTASSIUM SERPL-SCNC: 3.9 MMOL/L (ref 3.5–5.3)
PROTHROMBIN TIME: 26.7 S (ref 9.4–12)
SODIUM SERPL-SCNC: 135 MMOL/L (ref 135–147)
TRIGL SERPL-MCNC: 90 MG/DL (ref 40–150)
VLDLC SERPL-MCNC: 18 MG/DL (ref 5–37)

## 2020-06-22 ENCOUNTER — HOSPITAL ENCOUNTER (OUTPATIENT)
Dept: GENERAL RADIOLOGY | Facility: HOSPITAL | Age: 50
Discharge: HOME | End: 2020-06-22
Attending: NURSE PRACTITIONER

## 2020-07-02 ENCOUNTER — HOSPITAL ENCOUNTER (OUTPATIENT)
Dept: LAB | Facility: HOSPITAL | Age: 50
Discharge: HOME | End: 2020-07-02
Attending: INTERNAL MEDICINE

## 2020-07-02 LAB
INR PPP: 2.02 (ref 2–3)
PROTHROMBIN TIME: 20 S (ref 9.4–12)

## 2020-07-16 ENCOUNTER — HOSPITAL ENCOUNTER (OUTPATIENT)
Dept: LAB | Facility: HOSPITAL | Age: 50
Discharge: HOME | End: 2020-07-16
Attending: INTERNAL MEDICINE

## 2020-07-16 LAB
INR PPP: 3.5 (ref 2–3)
PROTHROMBIN TIME: 33.9 S (ref 9.4–12)

## 2020-08-17 ENCOUNTER — HOSPITAL ENCOUNTER (OUTPATIENT)
Dept: LAB | Facility: HOSPITAL | Age: 50
Discharge: HOME | End: 2020-08-17
Attending: INTERNAL MEDICINE

## 2020-08-17 LAB
INR PPP: 2.82 (ref 2–3)
PROTHROMBIN TIME: 27.5 S (ref 9.4–12)

## 2020-10-14 ENCOUNTER — CONVERSION ENCOUNTER (OUTPATIENT)
Dept: CARDIOLOGY | Facility: CLINIC | Age: 50
End: 2020-10-14
Attending: INTERNAL MEDICINE

## 2020-10-21 ENCOUNTER — OFFICE VISIT CONVERTED (OUTPATIENT)
Dept: CARDIOLOGY | Facility: CLINIC | Age: 50
End: 2020-10-21
Attending: INTERNAL MEDICINE

## 2020-10-21 ENCOUNTER — CONVERSION ENCOUNTER (OUTPATIENT)
Dept: CARDIOLOGY | Facility: CLINIC | Age: 50
End: 2020-10-21

## 2020-10-23 ENCOUNTER — HOSPITAL ENCOUNTER (OUTPATIENT)
Dept: LAB | Facility: HOSPITAL | Age: 50
Discharge: HOME | End: 2020-10-23
Attending: INTERNAL MEDICINE

## 2020-10-23 LAB
INR PPP: 2.37 (ref 2–3)
PROTHROMBIN TIME: 23.2 S (ref 9.4–12)

## 2020-10-26 ENCOUNTER — TRANSCRIBE ORDERS (OUTPATIENT)
Dept: CARDIOLOGY | Facility: CLINIC | Age: 50
End: 2020-10-26

## 2020-10-26 DIAGNOSIS — I42.1 CARDIOMYOPATHY, HYPERTROPHIC OBSTRUCTIVE (HCC): Primary | ICD-10-CM

## 2020-11-04 ENCOUNTER — OFFICE VISIT CONVERTED (OUTPATIENT)
Dept: FAMILY MEDICINE CLINIC | Facility: CLINIC | Age: 50
End: 2020-11-04
Attending: NURSE PRACTITIONER

## 2020-11-10 ENCOUNTER — HOSPITAL ENCOUNTER (OUTPATIENT)
Dept: MRI IMAGING | Facility: HOSPITAL | Age: 50
Discharge: HOME | End: 2020-11-10
Admitting: INTERNAL MEDICINE

## 2020-11-10 DIAGNOSIS — I42.1 CARDIOMYOPATHY, HYPERTROPHIC OBSTRUCTIVE (HCC): ICD-10-CM

## 2020-11-10 LAB — CREAT BLDA-MCNC: 1.1 MG/DL (ref 0.6–1.3)

## 2020-11-10 PROCEDURE — A9577 INJ MULTIHANCE: HCPCS | Performed by: INTERNAL MEDICINE

## 2020-11-10 PROCEDURE — 82565 ASSAY OF CREATININE: CPT

## 2020-11-10 PROCEDURE — 75561 CARDIAC MRI FOR MORPH W/DYE: CPT

## 2020-11-10 PROCEDURE — 75561 CARDIAC MRI FOR MORPH W/DYE: CPT | Performed by: INTERNAL MEDICINE

## 2020-11-10 PROCEDURE — 0 GADOBENATE DIMEGLUMINE 529 MG/ML SOLUTION: Performed by: INTERNAL MEDICINE

## 2020-11-10 RX ADMIN — GADOBENATE DIMEGLUMINE 20 ML: 529 INJECTION, SOLUTION INTRAVENOUS at 10:10

## 2020-11-19 ENCOUNTER — HOSPITAL ENCOUNTER (OUTPATIENT)
Dept: URGENT CARE | Facility: CLINIC | Age: 50
Discharge: HOME | End: 2020-11-19
Attending: PHYSICIAN ASSISTANT

## 2020-11-22 LAB — SARS-COV-2 RNA SPEC QL NAA+PROBE: DETECTED

## 2020-11-24 ENCOUNTER — HOSPITAL ENCOUNTER (OUTPATIENT)
Dept: LAB | Facility: HOSPITAL | Age: 50
Discharge: HOME | End: 2020-11-24
Attending: INTERNAL MEDICINE

## 2020-11-24 LAB
ALBUMIN SERPL-MCNC: 4.4 G/DL (ref 3.5–5)
ALBUMIN/GLOB SERPL: 1.2 {RATIO} (ref 1.4–2.6)
ALP SERPL-CCNC: 42 U/L (ref 53–128)
ALT SERPL-CCNC: 101 U/L (ref 10–40)
ANION GAP SERPL CALC-SCNC: 20 MMOL/L (ref 8–19)
AST SERPL-CCNC: 115 U/L (ref 15–50)
BILIRUB SERPL-MCNC: 0.76 MG/DL (ref 0.2–1.3)
BUN SERPL-MCNC: 14 MG/DL (ref 5–25)
BUN/CREAT SERPL: 11 {RATIO} (ref 6–20)
CALCIUM SERPL-MCNC: 9.3 MG/DL (ref 8.7–10.4)
CHLORIDE SERPL-SCNC: 97 MMOL/L (ref 99–111)
CHOLEST SERPL-MCNC: 134 MG/DL (ref 107–200)
CHOLEST/HDLC SERPL: 3.7 {RATIO} (ref 3–6)
CONV CO2: 24 MMOL/L (ref 22–32)
CONV CREATININE URINE, RANDOM: 372.5 MG/DL (ref 10–300)
CONV MICROALBUM.,U,RANDOM: 16.8 MG/L (ref 0–20)
CONV TOTAL PROTEIN: 8.1 G/DL (ref 6.3–8.2)
CREAT UR-MCNC: 1.28 MG/DL (ref 0.7–1.2)
EST. AVERAGE GLUCOSE BLD GHB EST-MCNC: 157 MG/DL
GFR SERPLBLD BASED ON 1.73 SQ M-ARVRAT: >60 ML/MIN/{1.73_M2}
GLOBULIN UR ELPH-MCNC: 3.7 G/DL (ref 2–3.5)
GLUCOSE SERPL-MCNC: 97 MG/DL (ref 70–99)
HBA1C MFR BLD: 7.1 % (ref 3.5–5.7)
HDLC SERPL-MCNC: 36 MG/DL (ref 40–60)
INR PPP: 2.69 (ref 2–3)
LDLC SERPL CALC-MCNC: 80 MG/DL (ref 70–100)
MICROALBUMIN/CREAT UR: 4.5 MG/G{CRE} (ref 0–25)
OSMOLALITY SERPL CALC.SUM OF ELEC: 284 MOSM/KG (ref 273–304)
POTASSIUM SERPL-SCNC: 4 MMOL/L (ref 3.5–5.3)
PROTHROMBIN TIME: 26.2 S (ref 9.4–12)
PSA SERPL-MCNC: 0.31 NG/ML (ref 0–4)
SODIUM SERPL-SCNC: 137 MMOL/L (ref 135–147)
TRIGL SERPL-MCNC: 92 MG/DL (ref 40–150)
VLDLC SERPL-MCNC: 18 MG/DL (ref 5–37)

## 2020-11-29 ENCOUNTER — HOSPITAL ENCOUNTER (OUTPATIENT)
Dept: URGENT CARE | Facility: CLINIC | Age: 50
Discharge: HOME | End: 2020-11-29

## 2020-12-08 ENCOUNTER — TELEMEDICINE CONVERTED (OUTPATIENT)
Dept: GASTROENTEROLOGY | Facility: CLINIC | Age: 50
End: 2020-12-08
Attending: NURSE PRACTITIONER

## 2020-12-14 ENCOUNTER — HOSPITAL ENCOUNTER (OUTPATIENT)
Dept: LAB | Facility: HOSPITAL | Age: 50
Discharge: HOME | End: 2020-12-14
Attending: NURSE PRACTITIONER

## 2020-12-14 LAB
ALBUMIN SERPL-MCNC: 4 G/DL (ref 3.5–5)
ALP SERPL-CCNC: 54 U/L (ref 53–128)
ALT SERPL-CCNC: 47 U/L (ref 10–40)
AST SERPL-CCNC: 34 U/L (ref 15–50)
BASOPHILS # BLD AUTO: 0.02 10*3/UL (ref 0–0.2)
BASOPHILS NFR BLD AUTO: 0.5 % (ref 0–3)
BILIRUB SERPL-MCNC: 0.54 MG/DL (ref 0.2–1.3)
CONV ABS IMM GRAN: 0.01 10*3/UL (ref 0–0.2)
CONV BILI, CONJUGATED: <0.2 MG/DL (ref 0–0.6)
CONV IMMATURE GRAN: 0.3 % (ref 0–1.8)
CONV TOTAL PROTEIN: 7.7 G/DL (ref 6.3–8.2)
CONV UNCONJUGATED BILIRUBIN: 0.3 MG/DL (ref 0–1.1)
DEPRECATED RDW RBC AUTO: 38.3 FL (ref 35.1–43.9)
EOSINOPHIL # BLD AUTO: 0.13 10*3/UL (ref 0–0.7)
EOSINOPHIL # BLD AUTO: 3.4 % (ref 0–7)
ERYTHROCYTE [DISTWIDTH] IN BLOOD BY AUTOMATED COUNT: 14.2 % (ref 11.6–14.4)
HCT VFR BLD AUTO: 42.5 % (ref 42–52)
HGB BLD-MCNC: 12.6 G/DL (ref 14–18)
INR PPP: 4.19 (ref 2–3)
LYMPHOCYTES # BLD AUTO: 1.84 10*3/UL (ref 1–5)
LYMPHOCYTES NFR BLD AUTO: 47.5 % (ref 20–45)
MCH RBC QN AUTO: 22.3 PG (ref 27–31)
MCHC RBC AUTO-ENTMCNC: 29.6 G/DL (ref 33–37)
MCV RBC AUTO: 75.2 FL (ref 80–96)
MONOCYTES # BLD AUTO: 0.65 10*3/UL (ref 0.2–1.2)
MONOCYTES NFR BLD AUTO: 16.8 % (ref 3–10)
NEUTROPHILS # BLD AUTO: 1.22 10*3/UL (ref 2–8)
NEUTROPHILS NFR BLD AUTO: 31.5 % (ref 30–85)
NRBC CBCN: 0 % (ref 0–0.7)
PLATELET # BLD AUTO: 375 10*3/UL (ref 130–400)
PMV BLD AUTO: 11.2 FL (ref 9.4–12.4)
PROTHROMBIN TIME: 40.6 S (ref 9.4–12)
RBC # BLD AUTO: 5.65 10*6/UL (ref 4.7–6.1)
WBC # BLD AUTO: 3.87 10*3/UL (ref 4.8–10.8)

## 2021-01-05 ENCOUNTER — HOSPITAL ENCOUNTER (OUTPATIENT)
Dept: LAB | Facility: HOSPITAL | Age: 51
Discharge: HOME | End: 2021-01-05
Attending: INTERNAL MEDICINE

## 2021-01-05 LAB
INR PPP: 3.82 (ref 2–3)
PROTHROMBIN TIME: 37 S (ref 9.4–12)

## 2021-01-19 ENCOUNTER — HOSPITAL ENCOUNTER (OUTPATIENT)
Dept: LAB | Facility: HOSPITAL | Age: 51
Discharge: HOME | End: 2021-01-19
Attending: INTERNAL MEDICINE

## 2021-01-19 LAB
INR PPP: 2.92 (ref 2–3)
PROTHROMBIN TIME: 28.4 S (ref 9.4–12)

## 2021-01-25 ENCOUNTER — HOSPITAL ENCOUNTER (OUTPATIENT)
Dept: LAB | Facility: HOSPITAL | Age: 51
Discharge: HOME | End: 2021-01-25
Attending: NURSE PRACTITIONER

## 2021-01-25 LAB
ALBUMIN SERPL-MCNC: 4.5 G/DL (ref 3.5–5)
ALP SERPL-CCNC: 45 U/L (ref 53–128)
ALT SERPL-CCNC: 45 U/L (ref 10–40)
AST SERPL-CCNC: 46 U/L (ref 15–50)
BILIRUB SERPL-MCNC: 0.91 MG/DL (ref 0.2–1.3)
CONV BILI, CONJUGATED: <0.2 MG/DL (ref 0–0.6)
CONV TOTAL PROTEIN: 8.1 G/DL (ref 6.3–8.2)
CONV UNCONJUGATED BILIRUBIN: 0.7 MG/DL (ref 0–1.1)
FERRITIN SERPL-MCNC: 174 NG/ML (ref 30–300)
FOLATE SERPL-MCNC: 9.3 NG/ML (ref 4.8–20)
INR PPP: 2.4 (ref 2–3)
IRON SATN MFR SERPL: 27 % (ref 20–55)
IRON SERPL-MCNC: 87 UG/DL (ref 70–180)
PROTHROMBIN TIME: 23.5 S (ref 9.4–12)
TIBC SERPL-MCNC: 319 UG/DL (ref 245–450)
TRANSFERRIN SERPL-MCNC: 223 MG/DL (ref 215–365)
VIT B12 SERPL-MCNC: 515 PG/ML (ref 211–911)

## 2021-01-26 ENCOUNTER — TELEMEDICINE CONVERTED (OUTPATIENT)
Dept: GASTROENTEROLOGY | Facility: CLINIC | Age: 51
End: 2021-01-26
Attending: NURSE PRACTITIONER

## 2021-02-15 ENCOUNTER — HOSPITAL ENCOUNTER (OUTPATIENT)
Dept: LAB | Facility: HOSPITAL | Age: 51
Discharge: HOME | End: 2021-02-15
Attending: NURSE PRACTITIONER

## 2021-02-15 LAB
CONV AFP: 1.4 NG/ML (ref 0–8.7)
FERRITIN SERPL-MCNC: 114 NG/ML (ref 30–300)
INR PPP: 2.38 (ref 2–3)
IRON SATN MFR SERPL: 23 % (ref 20–55)
IRON SERPL-MCNC: 70 UG/DL (ref 70–180)
PROTHROMBIN TIME: 24 S (ref 9.4–12)
TIBC SERPL-MCNC: 307 UG/DL (ref 245–450)
TRANSFERRIN SERPL-MCNC: 215 MG/DL (ref 215–365)

## 2021-02-16 LAB
ALBUMIN SERPL-MCNC: 3.8 G/DL (ref 2.9–4.4)
ALBUMIN/GLOB SERPL: 1.2 {RATIO} (ref 0.7–1.7)
ALPHA2 GLOB SERPL ELPH-MCNC: 0.5 G/DL (ref 0.4–1)
BETA GLOBULIN: 1.2 G/DL (ref 0.7–1.3)
CERULOPLASMIN SERPL-MCNC: 19.3 MG/DL (ref 16–31)
CONV ALPHA-1-GLOBULIN: 0.2 G/DL (ref 0–0.4)
CONV HEPATITIS B SURFACE AG W CONFIRMATION RE: NEGATIVE
CONV IMMUNOGLOBULIN G (IGG): 1589 MG/DL (ref 603–1613)
CONV IMMUNOGLOBULIN M (IGM): 58 MG/DL (ref 20–172)
CONV PE INTERPRETATION: NORMAL
CONV PE NOTE: NORMAL
CONV TOTAL PROTEIN: 7.1 G/DL (ref 6–8.5)
GAMMA GLOB SERPL ELPH-MCNC: 1.5 G/DL (ref 0.4–1.8)
GLOBULIN UR ELPH-MCNC: 3.3 G/DL (ref 2.2–3.9)
HAV IGM SERPL QL IA: NEGATIVE
HBV CORE IGM SERPL QL IA: NEGATIVE
HCV AB SER DONR QL: <0.1 S/CO RATIO (ref 0–0.9)
IGA SERPL-MCNC: 367 MG/DL (ref 90–386)
M-SPIKE: NORMAL G/DL
PROT PATTERN SERPL IFE-IMP: NORMAL
SMOOTH MUSCLE F-ACTIN AB IGG: 10 UNITS (ref 0–19)

## 2021-02-17 LAB — DEPRECATED MITOCHONDRIA M2 IGG SER-ACNC: <20 UNITS (ref 0–20)

## 2021-02-18 LAB
CONV NASH FIBROSURE: NORMAL
COPPER SERPL-MCNC: 85 UG/DL (ref 69–132)
DSDNA AB SER-ACNC: NEGATIVE [IU]/ML
ENA AB SER IA-ACNC: NEGATIVE {RATIO}

## 2021-02-22 LAB
A1AT SERPL-MCNC: 112 MG/DL (ref 101–187)
PHENOTYPE: NORMAL

## 2021-02-23 ENCOUNTER — HOSPITAL ENCOUNTER (OUTPATIENT)
Dept: GENERAL RADIOLOGY | Facility: HOSPITAL | Age: 51
Discharge: HOME | End: 2021-02-23
Attending: NURSE PRACTITIONER

## 2021-03-11 ENCOUNTER — HOSPITAL ENCOUNTER (OUTPATIENT)
Dept: LAB | Facility: HOSPITAL | Age: 51
Discharge: HOME | End: 2021-03-11
Attending: INTERNAL MEDICINE

## 2021-03-11 LAB
INR PPP: 3.25 (ref 2–3)
PROTHROMBIN TIME: 32.5 S (ref 9.4–12)

## 2021-04-08 ENCOUNTER — HOSPITAL ENCOUNTER (OUTPATIENT)
Dept: LAB | Facility: HOSPITAL | Age: 51
Discharge: HOME | End: 2021-04-08
Attending: INTERNAL MEDICINE

## 2021-04-08 LAB
INR PPP: 2.5 (ref 2–3)
PROTHROMBIN TIME: 25.1 S (ref 9.4–12)

## 2021-05-05 ENCOUNTER — HOSPITAL ENCOUNTER (OUTPATIENT)
Dept: LAB | Facility: HOSPITAL | Age: 51
Discharge: HOME | End: 2021-05-05
Attending: INTERNAL MEDICINE

## 2021-05-05 LAB
INR PPP: 3.4 (ref 2–3)
PROTHROMBIN TIME: 34.1 S (ref 9.4–12)

## 2021-05-10 NOTE — PROCEDURES
"   Procedure Note      Patient Name: Kevin Clifton   Patient ID: [id number]   Sex: Male   YOB: 1970    Primary Care Provider: Rickey GRIFFIN   Referring Provider: Radha GRIFFIN    Visit Date: October 14, 2020    Provider: Jaden Kern MD   Location: [facility]   Location Address: [address] [place] [address], [address]   CINDY Vásquez  [id number]   Location Phone: [phone]          FINAL REPORT   TRANSTHORACIC ECHOCARDIOGRAM REPORT    Diagnosis: Shortness of breath   Height: 5'10\" Weight: 233 B/P: 124/78 BSA: 2.2   Tech: JTW   MEASUREMENTS:  RVID (Diastole) : RVID. (NORMAL: 0.7 to 2.4 cm max)   LVID (Systole): 2.5 cm (Diastole): 3.6 cm. (NORMAL: 3.7 - 5.4 cm)   Posterior Wall Thickness (Diastole): 1.7 cm. (NORMAL: 0.8 - 1.1 cm)   Septal Thickness (Diastole): 1.7 cm. (NORMAL: 0.7 - 1.2 cm)   LAID (Systole): 3.6 cm. (NORMAL: 1.9 - 3.8 cm)   Aortic Root Diameter (Diastole): 3.2 cm. (NORMAL: 2.0 - 3.7 cm)   DOPPLER: Continuous-wave, pulse-wave, and color-flow examination of the mitral, aortic, and tricuspid valves was performed. The Doppler flow did show a normally functioning prosthetic mitral valve with a mean gradient of 4 mmHg. Doppler flow velocities were normal. E/A ratio is 1.0. DT= 201 msec. IVRT is 70 msec. E/E' is 17.   COMMENTS:  The patient underwent 2-D, M-Mode, and Doppler examination, including pulse-wave, continuous-wave, and color-flow analysis; the study is technically adequate.   FINDINGS:  AORTIC VALVE: Appeared to be normal. Trileaflet with central closure point. No evidence of any obstruction. No regurgitation.   MITRAL VALVE: Appeared to be normal. No evidence of any obstruction. No regurgitation.   TRICUSPID VALVE: Appeared to be normal.   PULMONIC VALVE: Not well seen.   LEFT ATRIUM: Appeared to be normal. No intracavity masses or clots seen. LA volume index is 27 mL/m2.   AORTIC ROOT: Appeared to be normal in size.   LEFT VENTRICLE: Hyperdynamic ejection fraction of " greater than 60%. No focal wall motion abnormalities. Moderate to severe left ventricular hypertrophy measuring 1.7 cm. No evidence of significant outflow obstruction seen across the valve.   RIGHT ATRIUM: Appeared to be normal; no obvious evidence of intracavity masses or clots.   RIGHT VENTRICLE: Normal size and function.   PERICARDIUM: Unremarkable. No evidence of effusion.   INFERIOR VENA CAVA: Diameter is 1.7 cm.   Fax: 10/19/2020      CONCLUSION:  1.  Hyperdynamic ejection fraction of greater than 60%.   2.  Moderate to severe left ventricular hypertrophy measuring 1.7 cm with no outflow obstruction.   3.  Normally functioning prosthetic mitral valve.      MD SONI Aragon/darron    This note was transcribed by Katrin Stoelo.  darron/soni  The above service was transcribed by Katrin Sotelo, and I attest to the accuracy of the note.                   Electronically Signed by: Kianna Sotelo-, Other -Author on October 19, 2020 04:04:16 PM  Electronically Co-signed by: Jaden Kern MD -Reviewer on October 22, 2020 08:08:29 AM

## 2021-05-10 NOTE — PROCEDURES
Procedure Note      Patient Name: Kevin Clifton   Patient ID: [id number]   Sex: Male   YOB: 1970    Primary Care Provider: Rickey GRIFFIN   Referring Provider: Radha GRIFFIN    Visit Date: October 14, 2020    Provider: Jaden Kern MD   Location: [facility]   Location Address: [address] [place] [address], [facility]   CINDY Vásquez  [id number]   Location Phone: [phone]          FINAL REPORT   HOLTER MONITOR REPORT  Date: 10/14/2020   Indication: Syncope      The patient underwent a 24-hour Holter monitor.  The average heart rate was 87 beats per minute.  The maximum heart rate was 114 beats per minute.  The minimum heart rate was 74 beats per minute.  The baseline rhythm was normal sinus rhythm. There were occasional PVCs totaling 6 beats.  There was also a 4-beat run of an SVT. There were 28 episodes of PACs.  No other sustained dysrhythmia.  The patient did have one episode of presyncope recorded. He was in normal sinus rhythm with underlying bundle branch block abnormality baseline.      IMPRESSION:     1.  Normal average heart rate of 87 beats per minute with baseline normal sinus rhythm and underlying bundle        branch block abnormality.  2.  Six episodes of PVCs.   3.  Twenty-eight episodes of PACs.   4.  A 4-beat run of a paroxysmal SVT.       MD SONI Aragon/darron    This note was transcribed by Katrin Sotelo.  darron/soni  The above service was transcribed by Katrin Sotelo, and I attest to the accuracy of the note.  SONI                 Electronically Signed by: Kianna Sotelo-, Other -Author on October 19, 2020 03:20:22 PM  Electronically Co-signed by: Jaden Kern MD -Reviewer on October 21, 2020 10:11:55 AM

## 2021-05-12 NOTE — PROGRESS NOTES
Progress Note      Patient Name: Kevin Clifton   Patient ID: [id number]   Sex: Male   YOB: 1970    Primary Care Provider: Rickey GRIFFIN   Referring Provider: Radha GRIFFIN    Visit Date: April 28, 2020    Provider: GABY Lion   Location: [facility]   Location Address: [address] [place] [address], [address]  [id number]   Location Phone: [phone]          Chief Complaint  · 6 month follow up DM2, HTN, Hyperlipidemia      History Of Present Illness  Kevin Clifton is a 49 year old /Black male who presents for evaluation and treatment of:      Patient presents to the office today for six-month follow-up regarding his diabetes, hypertension and hyperlipidemia.  Patient denies any concerns or complaints at this time.  He does state that his Coumadin dosage was recently adjusted by cardiology.  Patient denies needing any refills at this time.       Past Medical History  Abdominal bloating; Anemia; Asthma; Barretts esophagus; CHF (congestive heart failure); Degenerative Disc Disease ; Depression; Diabetes; Diabetes mellitus type 2, noninsulin dependent; Elevated liver enzymes; GERD (gastroesophageal reflux disease); Heartburn; High cholesterol; Hyperlipidemia; Hypertension; Hypertension, Benign Essential; LLQ abdominal pain; Nosebleed; Sleep apnea         Past Surgical History  Colonoscopy; EGD; heart surgery         Medication List  Amitiza 24 mcg oral capsule; aspirin 81 mg oral tablet,chewable; atorvastatin 10 mg oral tablet; baclofen 5 mg oral tablet; bupropion HCl 150 mg oral tablet extended release 24 hr; carvedilol 25 mg oral tablet; cetirizine 10 mg oral tablet; Coumadin 4 mg oral tablet; Coumadin 5 mg oral tablet; lisinopril 10 mg oral tablet; Lovenox 120 mg/0.8 mL subcutaneous syringe; melatonin 3 mg oral capsule; metformin 500 mg oral tablet; montelukast 10 mg oral tablet; pantoprazole 20 mg oral tablet,delayed release (DR/EC); Viagra 100 mg  "oral tablet; Vitamin D3 1,000 unit oral capsule; zolpidem 5 mg oral tablet         Allergy List  NO KNOWN DRUG ALLERGIES         Family Medical History  *No Known Family History         Social History  Alcohol (Current some day); lives with spouse; ; Tobacco (Never); Working         Immunizations  Name Date Admin   Influenza Refused         Review of Systems  · Constitutional  o Denies  o : fever, fatigue, weight loss, weight gain  · Cardiovascular  o Denies  o : lower extremity edema, claudication, chest pressure, palpitations  · Respiratory  o Denies  o : shortness of breath, wheezing, cough, hemoptysis, dyspnea on exertion  · Gastrointestinal  o Denies  o : nausea, vomiting, diarrhea, constipation, abdominal pain      Vitals  Date Time BP Position Site L\R Cuff Size HR RR TEMP (F) WT  HT  BMI kg/m2 BSA m2 O2 Sat        04/28/2020 03:43 /78 Sitting    89 - R 18 98.3 232lbs 16oz 5'  10\" 33.43 2.28 97 %          Physical Examination  · Constitutional  o Appearance  o : well-nourished, in no acute distress  · Neck  o Inspection/Palpation  o : normal appearance, no masses or tenderness, trachea midline  o Range of Motion  o : cervical range of motion within normal limits  o Thyroid  o : gland size normal, nontender, no nodules or masses present on palpation  · Respiratory  o Respiratory Effort  o : breathing unlabored  o Inspection of Chest  o : normal appearance  o Auscultation of Lungs  o : normal breath sounds throughout inspiration and expiration  · Cardiovascular  o Heart  o :   § Auscultation of Heart  § : regular rate and rhythm, no murmurs, gallops or rubs  o Peripheral Vascular System  o :   § Extremities  § : no clubbing or edema  · Skin and Subcutaneous Tissue  o General Inspection  o : no rashes or lesions present, no areas of discoloration  o Body Hair  o : hair normal for age, general body hair distribution normal for age  o Digits and Nails  o : no clubbing, cyanosis, deformities or edema " present, normal appearing nails  · Neurologic  o Mental Status Examination  o :   § Orientation  § : grossly oriented to person, place and time  o Gait and Station  o : normal gait, able to stand without difficulty  · Psychiatric  o Judgement and Insight  o : judgment and insight intact  o Mood and Affect  o : mood normal, affect appropriate  o Presence of Abnormal Thoughts  o : no hallucinations, no delusions present, no psychotic thoughts  · Left DM Foot Exam  o Sensation  o : normal sensory exam perceptible to 10-gram nylon monofilament exam (5/5), vibration and light touch.  o Visual Inspection  o : visual inspection is normal with no signs of breakdown, ulcerations or deformities unless otherwise noted.   o Vascular  o : palpable dorsalis pedis and posterir tibialis pulses present, normal capillary refill  · Right DM Foot Exam  o Sensation  o : normal sensory exam perceptible to 10-gram nylon monofilament exam (5/5), vibration and light touch.  o Visual Inspection  o : visual inspection is normal with no signs of breakdown, ulcerations or deformities unless otherwise noted.   o Vascular  o : palpable dorsalis pedis and posterir tibialis pulses present, normal capillary refill          Assessment  · Diabetes mellitus, type 2     250.00/E11.9  · Essential hypertension     401.9/I10  · Hyperlipidemia     272.4/E78.5      Plan  · Orders  o Diabetes 2 Panel (Urine Microalbumin, CMP, Lipid, A1c, ) [facility] (01317, 05097, 85020, 73623) - 250.00/E11.9 - 04/28/2020  o Diabetic Foot (Motor and Sensory) Exam Completed [facility] (, , 2028F) - 250.00/E11.9 - 04/28/2020  o ACO-41: Dilated Diabetic eye exam completed this year and results in chart/reviewed (2022F) - 250.00/E11.9 - 04/28/2020  o ACO-17: Screened for tobacco use AND received tobacco cessation intervention (2344F) - - 04/28/2020  o ACO-39: Current medications updated and reviewed () - - 04/28/2020  o ACO-14: Influenza immunization administered or previously  received () - - 04/28/2020  · Medications  o Medications have been Reconciled  o Transition of Care or Provider Policy  · Instructions  o Continue blood sugar monitoring daily and record. Bring your log to office visits. Call the office for readings below 70 and above 250 or any complications.  o Daily foot care. Avoid walking barefoot. Annual Dilated Eye Exam.  o Discussed with patient blood pressure monitoring, hemoglobin A1C levels need to be below 7.0, and LDL (Lipid) goals below 70.  o Patient advised to monitor blood pressure (B/P) at home and journal readings. Patient informed that a B/P reading at home of more than 130/80 is considered hypertension. For readings greater hnsv620/90 or higher patient is advised to follow up in the office with readings for management. Patient advised to limit sodium intake.  o Advised that cheeses and other sources of dairy fats, animal fats, fast food, and the extras (candy, pastries, pies, doughnuts and cookies) all contain LDL raising nutrients. Advised to increase fruits, vegetables, whole grains, and to monitor portion sizes.   o Patient was educated/instructed on their diagnosis, treatment and medications prior to discharge from the clinic today.  o Time spent with the patient was minutes, more than 50% face to face.  o Electronically Identified Patient Education Materials Provided Electronically  · Disposition  o Call or Return if symptoms worsen or persist.  o follow up in 6 months            Electronically Signed by: GABY Lion -Author on April 28, 2020 04:05:36 PM

## 2021-05-12 NOTE — PROGRESS NOTES
Progress Note      Patient Name: Kevin Clifton   Patient ID: [id number]   Sex: Male   YOB: 1970    Primary Care Provider: Rickey GRIFIFN   Referring Provider: Radha GRIFFIN    Visit Date: April 7, 2020    Provider: GABY Lion   Location: [facility]   Location Address: [address] [place] [address], [address] [place] [address]  [id number]   Location Phone: [phone]          Chief Complaint     Right arm and chest pain for 1 week         History Of Present Illness  Kevin Clifton is a 49 year old /Black male who presents for evaluation and treatment of:      Patient presents to the office today with complaints of right anterior arm and chest tenderness.  Patient states that this is been present for approximately a week.  He states that the only strenuous activity he has been doing is pull starting his mower.  Patient does state that the pain is exacerbated with abduction of his arm.  He states that taking deep breaths also causes mild tenderness to the area.  Patient denies any radiation of the pain.  He denies any shortness of breath.    Patient also request a refill of his Zyrtec.       Past Medical History  Disease Name Date Onset Notes   Abdominal bloating --  --    Anemia --  --    Asthma --  --    Barretts esophagus --  --    CHF (congestive heart failure) --  --    Degenerative Disc Disease  --  --    Depression --  --    Diabetes --  --    Diabetes mellitus type 2, noninsulin dependent --  --    Elevated liver enzymes --  --    GERD (gastroesophageal reflux disease) --  --    Heartburn --  --    High cholesterol --  --    Hyperlipidemia --  --    Hypertension --  --    Hypertension, Benign Essential --  --    LLQ abdominal pain --  --    Nosebleed --  --    Sleep apnea --  --          Past Surgical History  Procedure Name Date Notes   Colonoscopy 1.11.19 Dr. Olivera   EGD 1.11.19 Dr. Olivera   heart surgery 2019 --          Medication List  Name Date Started  Instructions   Amitiza 24 mcg oral capsule  take 1 capsule (24 mcg) by oral route 2 times per day with food and water   aspirin 81 mg oral tablet,chewable 04/26/2019 chew 1 tablet (81 mg) by oral route once daily   atorvastatin 10 mg oral tablet 04/26/2019 take 1 tablet (10 mg) by oral route once daily at bedtime for 90 days   bupropion HCl 150 mg oral tablet extended release 24 hr  take 1 tablet (150 mg) by oral route once daily   carvedilol 25 mg oral tablet 04/26/2019 take 1 tablet (25 mg) by oral route 2 times per day with food   cetirizine 10 mg oral tablet 04/07/2020 take 1 tablet (10 mg) by oral route once daily for 90 days   Coumadin 4 mg oral tablet 02/04/2020 take 1 tablet (4 mg) by oral route once daily   Coumadin 5 mg oral tablet 02/04/2020 take 1 tablet (5 mg) by oral route once daily   lisinopril 10 mg oral tablet  take 1 tablet (10 mg) by oral route once daily   Lovenox 120 mg/0.8 mL subcutaneous syringe 03/06/2020 inject 1 mg/kg by subcutaneous route every 12 hours   melatonin 3 mg oral capsule  take 1 capsule by oral route daily   metformin 500 mg oral tablet  take 1 tablet (500 mg) by oral route 2 times per day with morning and evening meals   montelukast 10 mg oral tablet  take 1 tablet (10 mg) by oral route once daily in the evening   pantoprazole 20 mg oral tablet,delayed release (DR/EC) 07/01/2019 take 1 tablet (20 mg) by oral route once daily for 30 days   Viagra 100 mg oral tablet  take 1 tablet (100 mg) by oral route once daily as needed approximately 1 hour before sexual activity   Vitamin D3 1,000 unit oral capsule  take 1 capsule by oral route daily   zolpidem 5 mg oral tablet  take 1 tablet (5 mg) by oral route once daily at bedtime         Allergy List  Allergen Name Date Reaction Notes   NO KNOWN DRUG ALLERGIES --  --  --        Allergies Reconciled  Family Medical History  Disease Name Relative/Age Notes   *No Known Family History  --          Social History  Finding Status Start/Stop  "Quantity Notes   Alcohol Current some day --/-- --  rarely drinks, less than 1 drink per day, has been drinking for 4 years  rarely drinks, less than 1 drink per day, has been drinking for 3 years   lives with spouse --  --/-- --  --     --  --/-- --  --    Tobacco Never --/-- --  never smoker   Working --  --/-- --  --          Immunizations  NameDate Admin Mfg Trade Name Lot Number Route Inj VIS Given VIS Publication   InfluenzaRefused 10/28/2019 NE Not Entered  NE NE     Comments:          Review of Systems  · Constitutional  o Denies  o : fatigue, night sweats  · Eyes  o Denies  o : double vision, blurred vision  · HENT  o Denies  o : vertigo, recent head injury  · Breasts  o Denies  o : abnormal changes in breast size, additional breast symptoms except as noted in the HPI  · Cardiovascular  o Denies  o : chest pain, irregular heart beats  · Respiratory  o Denies  o : shortness of breath, productive cough  · Gastrointestinal  o Denies  o : nausea, vomiting  · Genitourinary  o Denies  o : dysuria, urinary retention  · Integument  o Denies  o : hair growth change, new skin lesions  · Neurologic  o Denies  o : altered mental status, seizures  · Musculoskeletal  o Admits  o : arm pain  o Denies  o : joint swelling, limitation of motion  · Endocrine  o Denies  o : cold intolerance, heat intolerance  · Heme-Lymph  o Denies  o : petechiae, lymph node enlargement or tenderness  · Allergic-Immunologic  o Denies  o : frequent illnesses      Vitals  Date Time BP Position Site L\R Cuff Size HR RR TEMP (F) WT  HT  BMI kg/m2 BSA m2 O2 Sat        04/07/2020 01:46 /82 Sitting    78 - R 18 97.5 231lbs 0oz 5'  10\" 33.14 2.27 98 %          Physical Examination  · Constitutional  o Appearance  o : well developed, well-nourished, no obvious deformities present  · Eyes  o Eyelids/Ocular Adnexae  o : eyelid appearance normal, no exudates present  · Neck  o Inspection/Palpation  o : normal appearance, no masses or " tenderness, trachea midline  o Range of Motion  o : cervical range of motion within normal limits  o Thyroid  o : gland size normal, nontender, no nodules or masses present on palpation  · Respiratory  o Respiratory Effort  o : breathing unlabored  o Inspection of Chest  o : normal appearance  o Auscultation of Lungs  o : normal breath sounds throughout  · Cardiovascular  o Heart  o :   § Auscultation of Heart  § : regular rate and rhythm, no murmurs, gallops or rubs  o Peripheral Vascular System  o :   § Extremities  § : no cyanosis, clubbing or edema  · Musculoskeletal  o Spine  o :   § Inspection/Palpation  § : no spinal tenderness, scoliosis or kyphosis present  § Range of Motion  § : spine range of motion normal  o Right Upper Extremity  o :   § Inspection/Palpation  § : Right anterior shoulder, pectoralis muscle tenderness noted with palpation.  o Left Upper Extremity  o :   § Inspection/Palpation  § : no tenderness to palpation, ROM normal  o Right Lower Extremity  o :   § Inspection/Palpation  § : no joint or limb tenderness to palpation, ROM normal  o Left Lower Extremity  o :   § Inspection/Palpation  § : no joint or limb tenderness to palpation, ROM normal  · Skin and Subcutaneous Tissue  o General Inspection  o : no rashes or lesions present, no areas of discoloration  o Body Hair  o : hair normal for age, general body hair distribution normal for age  o Digits and Nails  o : no clubbing, cyanosis, deformities or edema present, normal appearing nails  · Neurologic  o Mental Status Examination  o :   § Orientation  § : grossly oriented to person, place and time  o Gait and Station  o : normal gait, able to stand without difficulty  · Psychiatric  o General  o : Appropriate mood and affect noted  o Mood and Affect  o : mood normal, affect appropriate          Assessment  · Allergic rhinitis due to allergen     477.9/J30.9  · Muscle strain     848.9/T14.8XXA    Problems Reconciled  Plan  · Orders  o ACO-39:  Current medications updated and reviewed () - - 04/07/2020  o ACO-14: Influenza immunization administered or previously received () - - 04/07/2020  · Medications  o baclofen 5 mg oral tablet   SIG: take 1 tablet by oral route 3 times a day as needed   DISP: (60) tablets with 0 refills  Prescribed on 04/07/2020     o cetirizine 10 mg oral tablet   SIG: take 1 tablet (10 mg) by oral route once daily for 90 days   DISP: (90) tablet with 1 refills  Adjusted on 04/07/2020     o warfarin 9 MG oral   SIG: Takes 1 tab QD   DISP: # 0 with 0 refills  Discontinued on 04/07/2020     o Medications have been Reconciled  o Transition of Care or Provider Policy  · Instructions  o Patient was educated/instructed on their diagnosis, treatment and medications prior to discharge from the clinic today.  o Time spent with the patient was minutes, more than 50% face to face.  · Disposition  o Call or Return if symptoms worsen or persist.  o Follow up in 2 weeks if not better            Electronically Signed by: GABY Lion -Author on April 7, 2020 04:51:29 PM

## 2021-05-13 NOTE — PROGRESS NOTES
Progress Note      Patient Name: Kevin Clifton   Patient ID: [id number]   Sex: Male   YOB: 1970    Primary Care Provider: Rickey GRIFFIN   Referring Provider: Radha GRIFFIN    Visit Date: November 4, 2020    Provider: GABY Lion   Location: [facility]   Location Address: [address] [place] [address], [address] [place] [address]  [id number]   Location Phone: [phone]          Chief Complaint  · 6 month follow up with refills      History Of Present Illness  Kevin Clifton is a 50 year old /Black male who presents for evaluation and treatment of:      Patient presents to the clinic today for a follow up regarding his diabetes, hypertension, hyperlipidemia. He denies any current issues or complaints at this time. He denies any chest pain or shortness of breath.  Type II DM: He does not check his blood sugars. Last A1C was 6.3%. He denies any issues with the metformin. His las labs were done in May.  HTN: His blood pressure is well controlled in the clinic today, 132/80. He takes Lisinopril and does well with that.  Insomnia: He does well with the ambien and melatonin.       Past Medical History  Disease Name Date Onset Notes   Abdominal bloating --  --    Anemia --  --    Asthma --  --    Barretts esophagus --  --    CHF (congestive heart failure) --  --    Degenerative Disc Disease  --  --    Depression --  --    Diabetes --  --    Diabetes mellitus type 2, noninsulin dependent --  --    Elevated liver enzymes --  --    GERD (gastroesophageal reflux disease) --  --    Heartburn --  --    High cholesterol --  --    Hyperlipidemia --  --    Hypertension --  --    Hypertension, Benign Essential --  --    LLQ abdominal pain --  --    Nosebleed --  --    Sleep apnea --  --          Past Surgical History  Procedure Name Date Notes   Colonoscopy 1.11.19 Dr. Olivera   EGD 1.11.19 Dr. Olivera   heart surgery 2019 --          Medication List  Name Date Started  Instructions   aspirin 81 mg oral tablet,chewable 04/26/2019 chew 1 tablet (81 mg) by oral route once daily   atorvastatin 20 mg oral tablet 05/19/2020 take 1 tablet (20 mg) by oral route once daily at bedtime for 90 days   bupropion HCl 150 mg oral tablet extended release 24 hr  take 1 tablet (150 mg) by oral route once daily   carvedilol 25 mg oral tablet 04/26/2019 take 1 tablet (25 mg) by oral route 2 times per day with food   cetirizine 10 mg oral tablet 11/04/2020 take 1 tablet (10 mg) by oral route once daily for 90 days   Cialis oral  --    Coumadin 4 mg oral tablet 02/04/2020 take 1 tablet (4 mg) by oral route once daily   lisinopril 10 mg oral tablet 10/20/2020 take 1 tablet (10 mg) by oral route once daily   melatonin 3 mg oral capsule  take 1 capsule by oral route daily   metformin 500 mg oral tablet  take 1 tablet (500 mg) by oral route 2 times per day with morning and evening meals   montelukast 10 mg oral tablet  take 1 tablet (10 mg) by oral route once daily in the evening   Vitamin D3 1,000 unit oral capsule  take 1 capsule by oral route daily   zolpidem 5 mg oral tablet  take 1 tablet (5 mg) by oral route once daily at bedtime         Allergy List  Allergen Name Date Reaction Notes   NO KNOWN DRUG ALLERGIES --  --  --        Allergies Reconciled  Family Medical History  Disease Name Relative/Age Notes   *No Known Family History  --          Social History  Finding Status Start/Stop Quantity Notes   Alcohol Current some day --/-- --  rarely drinks, less than 1 drink per day, has been drinking for 4 years  rarely drinks, less than 1 drink per day, has been drinking for 3 years   lives with spouse --  --/-- --  --     --  --/-- --  --    Tobacco Never --/-- --  never smoker   Working --  --/-- --  --          Immunizations  NameDate Admin Mfg Trade Name Lot Number Route Inj VIS Given VIS Publication   [id number]/04/2020 PMC Fluzone Quadrivalent [id number]  11/04/2020 08/15/2019   Comments:   "        Review of Systems  · Constitutional  o Denies  o : fatigue, night sweats  · Eyes  o Denies  o : double vision, blurred vision  · HENT  o Denies  o : vertigo, recent head injury  · Breasts  o Denies  o : abnormal changes in breast size, additional breast symptoms except as noted in the HPI  · Cardiovascular  o Denies  o : chest pain, irregular heart beats  · Respiratory  o Denies  o : shortness of breath, productive cough  · Gastrointestinal  o Denies  o : nausea, vomiting  · Genitourinary  o Denies  o : dysuria, urinary retention  · Integument  o Denies  o : hair growth change, new skin lesions  · Neurologic  o Denies  o : altered mental status, seizures  · Musculoskeletal  o Denies  o : joint swelling, limitation of motion  · Endocrine  o Denies  o : cold intolerance, heat intolerance  · Heme-Lymph  o Denies  o : petechiae, lymph node enlargement or tenderness  · Allergic-Immunologic  o Denies  o : frequent illnesses      Vitals  Date Time BP Position Site L\R Cuff Size HR RR TEMP (F) WT  HT  BMI kg/m2 BSA m2 O2 Sat FR L/min FiO2        11/04/2020 01:50 /80 Sitting    84 - R  98 242lbs 16oz 5'  10\" 34.87 2.33 97 %            Physical Examination  · Constitutional  o Appearance  o : well-nourished, in no acute distress  · Eyes  o Conjunctivae  o : conjunctivae normal  o Sclerae  o : sclerae white  o Pupils and Irises  o : pupils equal and round  o Eyelids/Ocular Adnexae  o : eyelid appearance normal, no exudates present  · Neck  o Inspection/Palpation  o : normal appearance, no masses or tenderness, trachea midline  o Range of Motion  o : cervical range of motion within normal limits  o Thyroid  o : gland size normal, nontender, no nodules or masses present on palpation  · Respiratory  o Respiratory Effort  o : breathing unlabored  o Inspection of Chest  o : normal appearance  o Auscultation of Lungs  o : normal breath sounds throughout inspiration and expiration  · Cardiovascular  o Heart  o : "   § Auscultation of Heart  § : regular rate, normal rhythm, mechanical valve sounds normal, no pericardial friction rub  o Peripheral Vascular System  o :   § Pedal Pulses  § : bilateral pulse strength 2+  § Extremities  § : no clubbing or edema  · Skin and Subcutaneous Tissue  o General Inspection  o : no rashes or lesions present, no areas of discoloration  o Body Hair  o : hair normal for age, general body hair distribution normal for age  o Digits and Nails  o : no clubbing, cyanosis, deformities or edema present, normal appearing nails  · Neurologic  o Mental Status Examination  o :   § Orientation  § : grossly oriented to person, place and time  o Gait and Station  o : normal gait, able to stand without difficulty  · Psychiatric  o Judgement and Insight  o : judgment and insight intact  o Mood and Affect  o : mood normal, affect appropriate  o Presence of Abnormal Thoughts  o : no hallucinations, no delusions present, no psychotic thoughts          Assessment  · Screening for depression     V79.0/Z13.89  · Need for influenza vaccination     V04.81/Z23  · Screening for prostate cancer     V76.44/Z12.5  · Diabetes mellitus, type 2     250.00/E11.9  · Essential hypertension     401.9/I10  · GERD (gastroesophageal reflux disease)     530.81/K21.9  · Hyperlipidemia     272.4/E78.5  · Insomnia, unspecified     780.52/G47.00  · Major depressive disorder     296.20/F32.2  · Mitral valve replaced     V43.3/Z95.2      Plan  · Orders  o Annual depression screening, 15 minutes (44224, ) - V79.0/Z13.89 - 11/04/2020  o ACO-18: Positive screen for clinical depression using a standardized tool and a follow-up plan documented () - V79.0/Z13.89 - 11/04/2020  o Immunization Admin Fee (Single) ([facility]) (28256) - V04.81/Z23 - 11/04/2020  o Fluzone Quadrivalent Vaccine, age 6 months + (75469) - V04.81/Z23 - 11/04/2020   Vaccine - Influenza; Dose: 0.5; Site: Right Upper Arm; Route: Intramuscular; Date: 11/04/2020 15:48:00;  Exp: 06/30/2021; Lot: [id number]; Mfg: sanofi pasteur; TradeName: Fluzone Quadrivalent; Administered By: Jesica Lovett MA; Comment: N/A  o PSA Ultrasensitive, ANNUAL SCREENING [facility] (97644, ) - V76.44/Z12.5 - 11/04/2020  o Diabetes 2 Panel (Urine Microalbumin, CMP, Lipid, A1c, ) [facility] (19615, 87043, 19966, 94633) - 250.00/E11.9 - 11/04/2020  o ACO-18: Positive screen for clinical depression using a standardized tool and a follow-up plan documented () - 296.20/F32.2 - 11/04/2020  o ACO-39: Current medications updated and reviewed (1159F, ) - - 11/04/2020  · Medications  o cetirizine 10 mg oral tablet   SIG: take 1 tablet (10 mg) by oral route once daily for 90 days   DISP: (90) Tablet with 1 refills  Refilled on 11/04/2020     o Medications have been Reconciled  o Transition of Care or Provider Policy  · Instructions  o Depression Screen completed and scanned into the EMR under the designated folder within the patient's documents.  o Today's PHQ-9 result is 7___  o Continue blood sugar monitoring daily and record. Bring your log to office visits. Call the office for readings below 70 and above 250 or any complications.  o Daily foot care. Avoid walking barefoot. Annual Dilated Eye Exam.  o Discussed with patient blood pressure monitoring, hemoglobin A1C levels need to be below 7.0, and LDL (Lipid) goals below 70.  o Advised that cheeses and other sources of dairy fats, animal fats, fast food, and the extras (candy, pastries, pies, doughnuts and cookies) all contain LDL raising nutrients. Advised to increase fruits, vegetables, whole grains, and to monitor portion sizes.   o Patient was educated/instructed on their diagnosis, treatment and medications prior to discharge from the clinic today.  o Minutes spent with patient including greater than 50% in Education/Counseling/Care Coordination.  o Time spent with the patient was minutes, more than 50% face to face.  o Electronically Identified Patient Education  Materials Provided Electronically  · Disposition  o Call or Return if symptoms worsen or persist.  o follow up in 6 months            Electronically Signed by: GABY Lion -Author on November 4, 2020 04:24:14 PM

## 2021-05-13 NOTE — PROGRESS NOTES
"   Progress Note      Patient Name: Kevin Clifton   Patient ID: [id number]   Sex: Male   YOB: 1970    Primary Care Provider: Rickey GRIFFIN   Referring Provider: Chon Mckeon MD    Visit Date: October 21, 2020    Provider: Jaden Kern MD   Location: [facility]   Location Address: [address] [place] [address], [address]   [place]  [id number]   Location Phone: [phone]          Chief Complaint  · Hypertrophic cardiomyopathy       History Of Present Illness  REFERRING CARE PROVIDER: Chon Mckeon MD   Kevin Clifton is a 50 year old /Black gentleman with a history of hypertrophic cardiomyopathy with mitral regurgitation, s/p myomectomy and mitral valve replacement, paroxysmal atrial fibrillation, who has been having intermittent dizziness spells and some shortness of breath.   PAST MEDICAL HISTORY: Hypertrophic obstructive cardiomyopathy; myomectomy of septal myocardium with mitral valve replacement; hypertension; paroxysmal atrial fibrillation with ablation.   PSYCHOSOCIAL HISTORY: He rarely drinks alcohol and never used tobacco.   CURRENT MEDICATIONS: include Metformin 500 mg b.i.d.; Atorvastatin 10 mg daily; Warfarin; Montelukast 10 mg daily; Vitamin D; Zolpidem 5 mg daily; ASA 81 mg daily; Cetirizine 10 mg daily; Lisinopril 10/12.5 mg daily; Bupropion 200 mg daily; Carvedilol 25 mg b.i.d.; melatonin; Sildenafil p.r.n. The dosage and frequency of the medications were reviewed with the patient.       Review of Systems  · Cardiovascular  o Denies  o : palpitations (fast, fluttering, or skipping beats), swelling (feet, ankles, hands), shortness of breath while walking or lying flat, chest pain or angina pectoris   · Respiratory  o Denies  o : chronic or frequent cough, asthma or wheezing      Vitals  Date Time BP Position Site L\R Cuff Size HR RR TEMP (F) WT  HT  BMI kg/m2 BSA m2 O2 Sat FR L/min FiO2 HC       10/21/2020 10:04 /80 Sitting    74 - R   243lbs 16oz 5'  10\" " 35.01 2.34             Physical Examination  · Constitutional  o Appearance  o : Awake, alert, in no acute distress.   · Eyes  o Conjunctivae  o : Normal.  · Ears, Nose, Mouth and Throat  o Oral Cavity  o :   § Oral Mucosa  § : Normal.  · Neck  o Inspection/Palpation  o : No JVD. Good carotid upstroke. No thyromegaly.  · Respiratory  o Respiratory  o : Good respiratory effort. Clear to percussion and auscultation.  · Cardiovascular  o Heart  o :   § Auscultation of Heart  § : S1, S2 normal. Regular rate and rhythm without murmurs, gallops, or rubs.  o Peripheral Vascular System  o :   § Extremities  § : Good femoral and pedal pulses. No pedal edema.  · Gastrointestinal  o Abdominal Examination  o : Soft. No tenderness or masses felt. No hepatosplenomegaly. Abdominal aorta is not palpable.          Assessment     ASSESSMENT AND PLAN:    1.  Hypertrophic cardiomyopathy:  Patient with stable dyspnea on exertion issues, and no evidence of any        volume overload.  Recommend cardiac MRI for evaluation for any high-risk features for possible ICD        implantation.  2.  Dizziness:  May be from orthostasis.  Recommended changing his Lisinopril/HCT to just Lisinopril 10 mg        once a day.  If patient's symptoms persist, decreasing his Coreg as well.  His 24-hour monitor did not show       any dysrhythmias during any of the dizzy spells.  3.  Atrial fibrillation:  Symptomatically in normal sinus rhythm and is on Coumadin for CVA prevention.    MD Soni Aragon/bradley         This note was transcribed by Marsha Cowart.  bradley/soni   The above service was transcribed by Marsha Cowart, and I attest to the accuracy of the note.  SONI.               Electronically Signed by: Marsha Cowart-, -Author on October 22, 2020 08:02:54 AM  Electronically Co-signed by: Jaden Kern MD -Reviewer on October 22, 2020 08:04:07 AM

## 2021-05-13 NOTE — PROGRESS NOTES
Progress Note      Patient Name: Kevin Clifton   Patient ID: [id number]   Sex: Male   YOB: 1970    Primary Care Provider: Rickey GRIFFIN   Referring Provider: Chon Mckeon MD    Visit Date: December 8, 2020    Provider: GABY Smith   Location: [facility]   Location Address: [address] [place] [address]  [id number]   Location Phone: [phone]          Chief Complaint  · elev LFTs      History Of Present Illness  Kevin Clifton is a 50 year old /Black male who presents to the office today.   Video Conferencing Visit  Kevin Clifton is a 50 year old /Black male who is presenting for evaluation via video conferencing via FORMAT OF VIDEO VISIT. Verbal consent obtained before beginning visit.   The following staff were present during this visit: Santiago Jewell MA ;Magdalena Hammonds [facility]      Patient has not been here since July 2019.  He has a history of anemia with negative EGD colonoscopy and capsule endoscopy in 2019 other than Carr's esophagus was found. Pt was referred to hematology last visit.   PMH significant for open heart with MVR, Coumadin therapy 2019.    Pt referred back for elev LFTs. NO GI c/o. NO abd pain, no diarrhea, no blood in stool or black stools.  Pt states he has recently recovered from COVID,  dx 11/19/20. He has noticed some nausea after sweets since COVID dx.  11/24/2020    alk phos 42 albumin 4.4  Bili 0.76  Rarely drinks ETOH,  taking Elderberry and MVI       Past Medical History  Abdominal bloating; Anemia; Asthma; Barretts esophagus; CHF (congestive heart failure); Degenerative Disc Disease ; Depression; Diabetes; Diabetes mellitus type 2, noninsulin dependent; Elevated liver enzymes; GERD (gastroesophageal reflux disease); Heartburn; High cholesterol; Hyperlipidemia; Hypertension; Hypertension, Benign Essential; LLQ abdominal pain; Nosebleed; Sleep apnea         Past Surgical  History  Colonoscopy; EGD; heart surgery         Medication List  Name Date Started Instructions   aspirin 81 mg oral tablet,chewable 04/26/2019 chew 1 tablet (81 mg) by oral route once daily   atorvastatin 20 mg oral tablet 05/19/2020 take 1 tablet (20 mg) by oral route once daily at bedtime for 90 days   bupropion HCl 150 mg oral tablet extended release 24 hr  take 1 tablet (150 mg) by oral route once daily   carvedilol 25 mg oral tablet 04/26/2019 take 1 tablet (25 mg) by oral route 2 times per day with food   cetirizine 10 mg oral tablet 11/04/2020 take 1 tablet (10 mg) by oral route once daily for 90 days   Cialis oral  --    Coumadin 4 mg oral tablet 02/04/2020 take 1 tablet (4 mg) by oral route once daily   lisinopril 10 mg oral tablet 10/20/2020 take 1 tablet (10 mg) by oral route once daily   melatonin 3 mg oral capsule  take 1 capsule by oral route daily   metformin 500 mg oral tablet  take 1 tablet (500 mg) by oral route 2 times per day with morning and evening meals   montelukast 10 mg oral tablet  take 1 tablet (10 mg) by oral route once daily in the evening   Vitamin D3 1,000 unit oral capsule  take 1 capsule by oral route daily   zolpidem 5 mg oral tablet  take 1 tablet (5 mg) by oral route once daily at bedtime         Allergy List  NO KNOWN DRUG ALLERGIES         Family Medical History  *No Known Family History; No family history of colorectal cancer         Social History  Alcohol (Current some day); lives with spouse; ; Tobacco (Never); Working         Immunizations  Name Date Admin   Influenza 11/04/2020   Influenza Refused         Review of Systems  · Constitutional  o Admits  o : good general health lately, no acute distress  · Gastrointestinal  o Denies  o : additional gastrointestinal symptoms except as noted in the HPI  · Psychiatric  o Denies  o : pleasant affect      Physical Examination  · Constitutional  o Appearance  o : well developed, well-nourished, in no acute  distress  · Head and Face  o Head  o :   § Inspection  § : atraumatic, normocephalic  · Eyes  o Sclerae  o : sclerae white, no sclerae icterus  · Neck  o Inspection/Palpation  o : supple  · Respiratory  o Respiratory Effort  o : breathing unlabored  · Neurologic  o Mental Status Examination  o :   § Orientation  § : grossly oriented to person, place and time  § Speech/Language  § : communication ability within normal limits, voice quality normal, articulation of speech normal, no evidence of aphasia  § Attention  § : attention normal, concentration abilities normal  o Sensation  o : grossly intact  o Gait and Station  o :   § Gait Screening  § : normal gait  · Psychiatric  o General  o : Alert and oriented x3  o Mood and Affect  o : Mood and affect are appropriate to circumstances          Assessment  · Elevated LFTs     790.6/R79.89      Plan  · Orders  o CBC with Auto Diff [facility] (73518) - - 12/08/2020  o Liver Profile [facility] (11612) - - 12/08/2020  · Instructions  o Encouraged to follow-up with Primary Care Provider for preventative care.  o Patient was educated/instructed on their diagnosis, treatment and medications prior to discharge from the clinic today.  o Patient instructed to seek medical attention urgently for new or worsening symptoms.  o Stop all vitamins and supplements; recheck LFT's , d/w pt may have been elevated d/t COVID. But if continues to be elevated will do workup.  o F/U next available            Electronically Signed by: GABY Smith -Author on December 8, 2020 05:04:15 PM

## 2021-05-14 VITALS
BODY MASS INDEX: 34.93 KG/M2 | HEART RATE: 74 BPM | HEIGHT: 70 IN | DIASTOLIC BLOOD PRESSURE: 80 MMHG | SYSTOLIC BLOOD PRESSURE: 122 MMHG | WEIGHT: 244 LBS

## 2021-05-14 VITALS
DIASTOLIC BLOOD PRESSURE: 80 MMHG | WEIGHT: 243 LBS | SYSTOLIC BLOOD PRESSURE: 132 MMHG | HEART RATE: 84 BPM | TEMPERATURE: 98 F | BODY MASS INDEX: 34.79 KG/M2 | OXYGEN SATURATION: 97 % | HEIGHT: 70 IN

## 2021-05-14 NOTE — PROGRESS NOTES
Progress Note      Patient Name: Kevin Clifton   Patient ID: [id number]   Sex: Male   YOB: 1970    Primary Care Provider: Rickey GRIFFIN   Referring Provider: hCon Mckeon MD    Visit Date: January 26, 2021    Provider: GABY Smith   Location[facility] [address]   Location Address: [address] [place] [address]  [id number]   Location Phone: [phone]          Chief Complaint  · Follow up      History Of Present Illness  Kevin Clifton is a 50 year old /Black male who presents to the office today.   Video Conferencing Visit  Kevin Clifton is a 50 year old /Black male who is presenting for evaluation via video conferencing via FORMAT OF VIDEO VISIT. Verbal consent obtained before beginning visit.   The following staff were present during this visit: Santiago Jewell MA, Magdalena Hammonds [facility]      We saw patient in 2019 for anemia, he had a negative EGD, colonoscopy, and capsule endoscopy other than Carr's esophagus was found.  He was referred to hematology but he did not go.    PMH significant for open heart with MVR, Coumadin therapy 2019.    Patient was referred back to us for elevated LFTs in December 2020.  He had no GI complaints, he did report recently recovering from Covid, was diagnosed 11/19/2020, ,  11/24/2020.  He did report taking elderberry and a multivitamin.  He was advised to discontinue any and all vitamins and supplements, and we repeated LFTs which show improvement as below.    12/14/2020: ALT 47, AST 34, alk phos 54, hemoglobin low at 12.6, WBC low at 3.8 and neutrophils low at 1.22.  1/25/2021 ferritin 174, iron profile normal, B12 515, alk phos 45, AST 46, ALT 45 slightly elevated.      Pt has no GI c/o, he is feeling well. R/W him recent labs, he states he has seen heme before, but it was not in [place], and he doesn't remember dx he was given. LFT's better, could have been elevated d/t COVID  and/or Elderberry supplement, but as still not normal recommended liver w/u and pt agreeable.       Past Medical History  Abdominal bloating; Anemia; Asthma; Barretts esophagus; CHF (congestive heart failure); Degenerative Disc Disease ; Depression; Diabetes; Diabetes mellitus type 2, noninsulin dependent; Elevated liver enzymes; GERD (gastroesophageal reflux disease); Heartburn; High cholesterol; Hyperlipidemia; Hypertension; Hypertension, Benign Essential; LLQ abdominal pain; Nosebleed; Sleep apnea         Past Surgical History  Colonoscopy; EGD; heart surgery         Medication List  Name Date Started Instructions   aspirin 81 mg oral tablet,chewable 04/26/2019 chew 1 tablet (81 mg) by oral route once daily   atorvastatin 20 mg oral tablet 05/19/2020 take 1 tablet (20 mg) by oral route once daily at bedtime for 90 days   bupropion HCl 150 mg oral tablet extended release 24 hr  take 1 tablet (150 mg) by oral route once daily   carvedilol 25 mg oral tablet 04/26/2019 take 1 tablet (25 mg) by oral route 2 times per day with food   cetirizine 10 mg oral tablet 11/04/2020 take 1 tablet (10 mg) by oral route once daily for 90 days   Cialis oral  --    Coumadin 4 mg oral tablet 02/04/2020 take 1 tablet (4 mg) by oral route once daily   lisinopril 10 mg oral tablet 10/20/2020 take 1 tablet (10 mg) by oral route once daily   melatonin 3 mg oral capsule  take 1 capsule by oral route daily   metformin 500 mg oral tablet  take 1 tablet (500 mg) by oral route 2 times per day with morning and evening meals   montelukast 10 mg oral tablet  take 1 tablet (10 mg) by oral route once daily in the evening   Vitamin D3 1,000 unit oral capsule  take 1 capsule by oral route daily   zolpidem 5 mg oral tablet  take 1 tablet (5 mg) by oral route once daily at bedtime         Allergy List  NO KNOWN DRUG ALLERGIES         Family Medical History  *No Known Family History; No family history of colorectal cancer         Social History  Alcohol  (Current some day); lives with spouse; ; Tobacco (Never); Working         Immunizations  Name Date Admin   Influenza 11/04/2020   Influenza Refused         Review of Systems  · Constitutional  o Admits  o : good general health lately, no acute distress  · Gastrointestinal  o Denies  o : additional gastrointestinal symptoms except as noted in the HPI  · Psychiatric  o Admits  o : pleasant affect      Physical Examination  · Constitutional  o Appearance  o : well developed, well-nourished, in no acute distress  · Head and Face  o Head  o :   § Inspection  § : atraumatic, normocephalic  · Eyes  o Sclerae  o : sclerae white, no sclerae icterus  · Neck  o Inspection/Palpation  o : supple  · Respiratory  o Respiratory Effort  o : breathing unlabored  · Skin and Subcutaneous Tissue  o General Inspection  o : no lesions present, no rashes present- to face/neck  · Neurologic  o Mental Status Examination  o :   § Orientation  § : grossly oriented to person, place and time  § Speech/Language  § : communication ability within normal limits, voice quality normal, articulation of speech normal, no evidence of aphasia  § Attention  § : attention normal, concentration abilities normal  · Psychiatric  o General  o : Alert and oriented x3  o Mood and Affect  o : Mood and affect are appropriate to circumstances          Assessment  · Anemia     285.9/D64.9  · Elevated LFTs     790.6/R79.89  · Leukopenia     288.50/D72.819      Plan  · Orders  o RUQ US (right upper quadrant ultrasound) (98440) - - 01/26/2021  o Acute hepatitis panel (HAV IgM, HbcAb IgM, HbsAg, HCV) (41212, 75922, 21067, 88022, 78663) - - 01/26/2021  o Copper level (24294) - - 01/26/2021  o Ceruloplasmin level (16555) - - 01/26/2021  o Anti-Smooth Muscle Antibody (ASMA) [facility] (49052) - - 01/26/2021  o Anti-mitochondrial antibody assay (16967) - - 01/26/2021  o Alpha-1-Antitrypsin/Phenotype HM (30724, 12714) - - 01/26/2021  o Iron Profile (Iron 28507 TIBC 52777 and  Transferrin 68782) (IRONP) - - 01/26/2021  o PT/INR (70572) - - 01/26/2021  o AFP ser (02520) - - 01/26/2021  o KINGSLEY (antinuclear antibody profile) by enzyme immunoassay (90579) - - 01/26/2021  o Ferritin ser/plas (86915) - - 01/26/2021  o SHOOK Fibrosure [facility] (drawn at [facility] only and must be fasting 8 hours; requires HT and WT) (13176, 27916, 05394, 46772, 02276, 69181, 51378, 21327, 66033, 91810) - - 01/26/2021  o Immunoelectrophoresis, Serum HMH (97113, 52648, 59350, 22195) - - 01/26/2021  o Hematology / Oncology Consult (HEMOC) - 285.9/D64.9, 288.50/D72.819 - 01/26/2021   Dr Aguilera  · Instructions  o Encouraged to follow-up with Primary Care Provider for preventative care.  o Patient was educated/instructed on their diagnosis, treatment and medications prior to discharge from the clinic today.  o Patient instructed to seek medical attention urgently for new or worsening symptoms.  o Follow Up: Next avaliable appointment            Electronically Signed by: GABY Smith -Author on January 26, 2021 05:24:21 PM

## 2021-05-15 VITALS
WEIGHT: 231 LBS | OXYGEN SATURATION: 98 % | TEMPERATURE: 97.5 F | DIASTOLIC BLOOD PRESSURE: 82 MMHG | HEIGHT: 70 IN | RESPIRATION RATE: 18 BRPM | SYSTOLIC BLOOD PRESSURE: 138 MMHG | HEART RATE: 78 BPM | BODY MASS INDEX: 33.07 KG/M2

## 2021-05-15 VITALS
SYSTOLIC BLOOD PRESSURE: 120 MMHG | BODY MASS INDEX: 31.64 KG/M2 | DIASTOLIC BLOOD PRESSURE: 88 MMHG | HEART RATE: 97 BPM | WEIGHT: 221 LBS | HEIGHT: 70 IN

## 2021-05-15 VITALS
SYSTOLIC BLOOD PRESSURE: 129 MMHG | BODY MASS INDEX: 34.07 KG/M2 | OXYGEN SATURATION: 99 % | HEIGHT: 70 IN | HEART RATE: 87 BPM | TEMPERATURE: 97.6 F | DIASTOLIC BLOOD PRESSURE: 76 MMHG | WEIGHT: 238 LBS

## 2021-05-15 VITALS
HEART RATE: 100 BPM | WEIGHT: 223 LBS | SYSTOLIC BLOOD PRESSURE: 122 MMHG | HEIGHT: 70 IN | BODY MASS INDEX: 31.92 KG/M2 | DIASTOLIC BLOOD PRESSURE: 84 MMHG

## 2021-05-15 VITALS
BODY MASS INDEX: 33.36 KG/M2 | SYSTOLIC BLOOD PRESSURE: 120 MMHG | DIASTOLIC BLOOD PRESSURE: 80 MMHG | HEART RATE: 92 BPM | WEIGHT: 233 LBS | HEIGHT: 70 IN

## 2021-05-15 VITALS
DIASTOLIC BLOOD PRESSURE: 71 MMHG | TEMPERATURE: 97 F | RESPIRATION RATE: 12 BRPM | SYSTOLIC BLOOD PRESSURE: 128 MMHG | WEIGHT: 219.56 LBS | HEART RATE: 96 BPM | HEIGHT: 70 IN | BODY MASS INDEX: 31.43 KG/M2 | OXYGEN SATURATION: 96 %

## 2021-05-15 VITALS
TEMPERATURE: 98.3 F | BODY MASS INDEX: 33.36 KG/M2 | SYSTOLIC BLOOD PRESSURE: 124 MMHG | WEIGHT: 233 LBS | OXYGEN SATURATION: 97 % | RESPIRATION RATE: 18 BRPM | HEART RATE: 89 BPM | HEIGHT: 70 IN | DIASTOLIC BLOOD PRESSURE: 78 MMHG

## 2021-05-15 VITALS
WEIGHT: 241 LBS | SYSTOLIC BLOOD PRESSURE: 126 MMHG | BODY MASS INDEX: 34.5 KG/M2 | HEIGHT: 70 IN | HEART RATE: 56 BPM | DIASTOLIC BLOOD PRESSURE: 74 MMHG

## 2021-05-15 VITALS
BODY MASS INDEX: 33.64 KG/M2 | HEART RATE: 74 BPM | HEIGHT: 70 IN | SYSTOLIC BLOOD PRESSURE: 111 MMHG | OXYGEN SATURATION: 95 % | WEIGHT: 235 LBS | DIASTOLIC BLOOD PRESSURE: 67 MMHG

## 2021-05-15 VITALS
DIASTOLIC BLOOD PRESSURE: 69 MMHG | HEIGHT: 70 IN | SYSTOLIC BLOOD PRESSURE: 115 MMHG | HEART RATE: 94 BPM | BODY MASS INDEX: 33.41 KG/M2 | WEIGHT: 233.37 LBS

## 2021-05-15 VITALS — WEIGHT: 239 LBS | HEART RATE: 74 BPM | HEIGHT: 70 IN | BODY MASS INDEX: 34.22 KG/M2

## 2021-05-15 VITALS
WEIGHT: 225 LBS | SYSTOLIC BLOOD PRESSURE: 122 MMHG | HEIGHT: 70 IN | BODY MASS INDEX: 32.21 KG/M2 | RESPIRATION RATE: 18 BRPM | TEMPERATURE: 98 F | DIASTOLIC BLOOD PRESSURE: 80 MMHG | HEART RATE: 68 BPM | OXYGEN SATURATION: 97 %

## 2021-05-15 VITALS
TEMPERATURE: 98 F | DIASTOLIC BLOOD PRESSURE: 78 MMHG | RESPIRATION RATE: 18 BRPM | HEART RATE: 91 BPM | BODY MASS INDEX: 33.07 KG/M2 | OXYGEN SATURATION: 100 % | WEIGHT: 231 LBS | HEIGHT: 70 IN | SYSTOLIC BLOOD PRESSURE: 134 MMHG

## 2021-05-15 VITALS
HEIGHT: 70 IN | WEIGHT: 230 LBS | BODY MASS INDEX: 32.93 KG/M2 | HEART RATE: 91 BPM | OXYGEN SATURATION: 99 % | RESPIRATION RATE: 16 BRPM | TEMPERATURE: 97.6 F | SYSTOLIC BLOOD PRESSURE: 142 MMHG | DIASTOLIC BLOOD PRESSURE: 82 MMHG

## 2021-05-15 VITALS
BODY MASS INDEX: 32.78 KG/M2 | HEIGHT: 70 IN | DIASTOLIC BLOOD PRESSURE: 106 MMHG | SYSTOLIC BLOOD PRESSURE: 146 MMHG | WEIGHT: 229 LBS | HEART RATE: 94 BPM

## 2021-05-16 VITALS
OXYGEN SATURATION: 98 % | HEIGHT: 70 IN | HEART RATE: 81 BPM | BODY MASS INDEX: 35.82 KG/M2 | WEIGHT: 250.19 LBS | SYSTOLIC BLOOD PRESSURE: 170 MMHG | DIASTOLIC BLOOD PRESSURE: 87 MMHG | RESPIRATION RATE: 16 BRPM | TEMPERATURE: 98.1 F

## 2021-05-16 VITALS
DIASTOLIC BLOOD PRESSURE: 76 MMHG | HEART RATE: 76 BPM | WEIGHT: 239 LBS | SYSTOLIC BLOOD PRESSURE: 124 MMHG | BODY MASS INDEX: 34.22 KG/M2 | HEIGHT: 70 IN

## 2021-05-16 VITALS
HEIGHT: 70 IN | DIASTOLIC BLOOD PRESSURE: 84 MMHG | HEART RATE: 64 BPM | SYSTOLIC BLOOD PRESSURE: 148 MMHG | BODY MASS INDEX: 34.79 KG/M2 | WEIGHT: 243 LBS

## 2021-05-16 VITALS
TEMPERATURE: 97 F | BODY MASS INDEX: 34.36 KG/M2 | HEIGHT: 70 IN | RESPIRATION RATE: 16 BRPM | HEART RATE: 72 BPM | WEIGHT: 240 LBS | DIASTOLIC BLOOD PRESSURE: 84 MMHG | OXYGEN SATURATION: 96 % | SYSTOLIC BLOOD PRESSURE: 152 MMHG

## 2021-05-16 VITALS
DIASTOLIC BLOOD PRESSURE: 88 MMHG | BODY MASS INDEX: 31.64 KG/M2 | HEIGHT: 70 IN | SYSTOLIC BLOOD PRESSURE: 120 MMHG | HEART RATE: 97 BPM | WEIGHT: 221 LBS

## 2021-05-16 VITALS
WEIGHT: 247 LBS | BODY MASS INDEX: 35.36 KG/M2 | DIASTOLIC BLOOD PRESSURE: 94 MMHG | HEART RATE: 70 BPM | SYSTOLIC BLOOD PRESSURE: 140 MMHG | HEIGHT: 70 IN

## 2021-05-16 VITALS
SYSTOLIC BLOOD PRESSURE: 165 MMHG | DIASTOLIC BLOOD PRESSURE: 99 MMHG | HEART RATE: 65 BPM | WEIGHT: 243 LBS | BODY MASS INDEX: 34.79 KG/M2 | HEIGHT: 70 IN

## 2021-05-16 VITALS
DIASTOLIC BLOOD PRESSURE: 92 MMHG | HEIGHT: 70 IN | WEIGHT: 234 LBS | SYSTOLIC BLOOD PRESSURE: 136 MMHG | HEART RATE: 72 BPM | BODY MASS INDEX: 33.5 KG/M2

## 2021-05-16 VITALS — HEIGHT: 70 IN | BODY MASS INDEX: 34.93 KG/M2 | WEIGHT: 244 LBS

## 2021-05-16 VITALS
DIASTOLIC BLOOD PRESSURE: 69 MMHG | TEMPERATURE: 96.7 F | HEART RATE: 95 BPM | SYSTOLIC BLOOD PRESSURE: 134 MMHG | BODY MASS INDEX: 33.27 KG/M2 | WEIGHT: 232.37 LBS | OXYGEN SATURATION: 97 % | HEIGHT: 70 IN

## 2021-05-25 ENCOUNTER — HOSPITAL ENCOUNTER (OUTPATIENT)
Dept: LAB | Facility: HOSPITAL | Age: 51
Discharge: HOME | End: 2021-05-25
Attending: INTERNAL MEDICINE

## 2021-05-25 LAB
INR PPP: 2.54 (ref 2–3)
PROTHROMBIN TIME: 25.5 S (ref 9.4–12)

## 2021-05-26 ENCOUNTER — OFFICE VISIT CONVERTED (OUTPATIENT)
Dept: CARDIOLOGY | Facility: CLINIC | Age: 51
End: 2021-05-26
Attending: INTERNAL MEDICINE

## 2021-05-26 ENCOUNTER — CONVERSION ENCOUNTER (OUTPATIENT)
Dept: CARDIOLOGY | Facility: CLINIC | Age: 51
End: 2021-05-26

## 2021-05-28 VITALS
SYSTOLIC BLOOD PRESSURE: 145 MMHG | WEIGHT: 243.06 LBS | HEART RATE: 70 BPM | OXYGEN SATURATION: 98 % | HEART RATE: 80 BPM | BODY MASS INDEX: 34.8 KG/M2 | OXYGEN SATURATION: 96 % | HEIGHT: 70 IN | OXYGEN SATURATION: 92 % | WEIGHT: 242.19 LBS | TEMPERATURE: 98.1 F | BODY MASS INDEX: 33.41 KG/M2 | TEMPERATURE: 98.2 F | HEIGHT: 70 IN | RESPIRATION RATE: 12 BRPM | TEMPERATURE: 98.9 F | SYSTOLIC BLOOD PRESSURE: 139 MMHG | WEIGHT: 233.37 LBS | SYSTOLIC BLOOD PRESSURE: 117 MMHG | DIASTOLIC BLOOD PRESSURE: 70 MMHG | DIASTOLIC BLOOD PRESSURE: 85 MMHG | HEIGHT: 70 IN | DIASTOLIC BLOOD PRESSURE: 96 MMHG | RESPIRATION RATE: 12 BRPM | BODY MASS INDEX: 34.67 KG/M2 | RESPIRATION RATE: 14 BRPM | HEART RATE: 77 BPM

## 2021-05-28 VITALS
WEIGHT: 235 LBS | SYSTOLIC BLOOD PRESSURE: 129 MMHG | BODY MASS INDEX: 33.64 KG/M2 | OXYGEN SATURATION: 96 % | DIASTOLIC BLOOD PRESSURE: 74 MMHG | RESPIRATION RATE: 16 BRPM | HEART RATE: 87 BPM | HEIGHT: 70 IN | TEMPERATURE: 97.5 F

## 2021-05-28 NOTE — PROGRESS NOTES
Patient: MISHA BERRY     Acct: [id number]     Report: #[id number]  UNIT #: [id number]     : 1970    Encounter Date:2019  PRIMARY CARE: ELIZABETH LAGUNAS  ***Signed***  --------------------------------------------------------------------------------------------------------------------  Chief Complaint      Encounter Date      Dec 18, 2019            Primary Care Provider      ELIZABETH LAGUNAS            Referring Provider      ELIZABETH LAGUNAS            Patient Complaint      Patient is complaining of      F/U for 1 year  FOR PULMONARY NODULES            VITALS      Height 5 ft 10 in / 177.8 cm      Weight 235 lbs  / 106.254064 kg      BSA 2.24 m2      BMI 33.7 kg/m2      Temperature 97.5 F / 36.39 C - Oral      Pulse 87      Respirations 16      Blood Pressure 129/74 Sitting, Left Arm      Pulse Oximetry 96%, room air      Initial Exhaled Nitrous Oxide      Exhaled Nitrous Oxide Results:  32            HPI      The patient is a 49 year old -American male, patient of Dr. Henry,     never smoker, [place] with history of atrial fibrillation, mitral valve     replacement, hypertrophic obstructive myopathy as well as mitral regurgitation.     The patient is here for follow up today.  The patient states that since last     visit he had his mitral valve replaced on 2019.  The patient states that     he is now taking Coumadin everyday.  The patient states since he has had his     mitral valve replacement, he is feeling much better.  The patient states that     his shortness of air has improved and he is able to walk, go up and down stairs     and perform all ADLs without difficulty.  The patient denies any coughing or     wheezing. The patient denies fevers, chills, night sweats, hemoptysis, purulent     sputum production, chest pain, chest tightness, syncope, palpitations, nausea,     vomiting or diarrhea.  The patient states that he is under the care of Dr. Kern     and is scheduled to follow  up with him in 03/2020.  The patient states that he     was scheduled to have a repeat chest CT and patient states that he did have     repeat chest CT when he was seen in the ER back in 03/2019, but has not had a     follow up chest CT before today.              I have personally reviewed the review of systems, past family, social, surgical     and medical histories and I agree with those as entered in the chart.      Copies To:   BARBARA KEITH      Constitutional:  Denies: Fatigue, Fever, Weight gain, Weight loss, Chills,     Insomnia, Other      Respiratory/Breathing:  Denies: Shortness of air, Wheezing, Cough, Hemoptysis,     Pleuritic pain, Other      Endocrine:  Denies: Polydipsia, Polyuria, Heat/cold intolerance, Diabetes, Other      Eyes:  Denies: Blurred vision, Vision Changes, Other      Ears, nose, mouth, throat:  Complains of: Nose Bleeding; Denies: Mouth lesions,     Thrush, Throat pain, Hoarseness, Allergies/Hay Fever, Post Nasal Drip,     Headaches, Recent Head Injury, Neck Stiffness, Thyroid Mass, Hearing Loss, Ear     Fullness, Dry Mouth, Nasal or Sinus Pain, Dry Lips, Nasal discharge, Nasal     congestion, Other      Cardiovascular:  Denies: Palpitations, Syncope, Claudication, Chest Pain, Wake     up Gasping for air, Leg Swelling, Irregular Heart Rate, Cyanosis, Dyspnea on     Exertion, Other      Gastrointestinal:  Complains of: Reflux/Heartburn; Denies: Nausea, Constipation,    Diarrhea, Abdominal pain, Vomiting, Difficulty Swallowing, Dysphagia, Jaundice,     Bloating, Melena, Bloody stools, Other      Genitourinary:  Denies: Urinary frequency, Incontinence, Hematuria, Urgency,     Nocturia, Dysuria, Testicular problems, Other      Musculoskeletal:  Denies: Joint Pain, Joint Stiffness, Joint Swelling, Myalgias,    Other      Hematologic/lymphatic:  DENIES: Lymphadenopathy, Bruising, Bleeding tendencies,     Other      Neurological:  Denies: Headache, Numbness, Weakness, Seizures,  Other      Psychiatric:  Denies: Anxiety, Appropriate Effect, Depression, Other      Sleep:  No: Excessive daytime sleep, Morning Headache?, Snoring, Insomnia?, Stop    breathing at sleep?, Other      Integumentary:  Denies: Rash, Dry skin, Skin Warm to Touch, Other      Immunologic/Allergic:  Denies: Latex allergy, Seasonal allergies, Asthma,     Urticaria, Eczema, Other      Immunization status:  No: Up to date            FAMILY/SOCIAL/MEDICAL HX      Surgical History:  Yes: Abdominal Surgery (COLONOSCOPY, EGD), Other Surgeries (2    cardiac ablations); No: Appendectomy, Bladder Surgery, Bowel Surgery, CABG,     Cholecystectomy, Head Surgery, Oral Surgery, Orthopedic Surgery, Vascular     Surgery      Diabetes - Family Hx:  Grandparent      Is Father Still Living?:  No      Is Mother Still Living?:  No       Family History:  Yes      Social History:  No Tobacco Use, No Alcohol Use, No Recreational Drug use      Smoking status:  Never smoker      Anticoagulation Therapy:  Yes (blood thinner)      Antibiotic Prophylaxis:  No      Medical History:  Yes: Asthma, Congestive Heart Failu (DIASTOLIC HEART FAILURE),    Diabetes (HX OF, NO LONGER ON MEDS), Hemorrhoids/Rectal Prob (REFLUX ), High     Blood Pressure (CONTROLLED WITH MEDS ), Shortness Of Breath; No: Arthritis,     Blood Disease, Chemotherapy/Cancer, Chronic Bronchitis/COPD, Deafness or Ringing    Ears, Seizures, Heart Attack, Sinus Trouble, Miscellaneous Medical/oth      Psychiatric History      NONE            PREVENTION      Hx Influenza Vaccination:  Yes      Date Influenza Vaccine Given:  Oct 1, 2019      Influenza Vaccine Declined:  No      2 or More Falls Past Year?:  No      Fall Past Year with Injury?:  No      Hx Pneumococcal Vaccination:  Yes      Encouraged to follow-up with:  PCP regarding preventative exams.      Chart initiated by      VETO DUMONT MA            ALLERGIES/MEDICATIONS      Allergies:        Coded Allergies:             NO KNOWN  ALLERGIES (Unverified , 12/18/19)      Medications    Last Reconciled on 12/18/19 14:57 by LIZBETH DESHPANDE,       Lubiprostone (Amitiza) 8 Mcg Cap      8 MCG PO BID, #60 CAP         Reported         12/18/19       Lisinopril* (Lisinopril*) 10 Mg Tablet      5 MG PO BID, #30 TAB 0 Refills         Reported         12/18/19       Cholecalciferol (Vitamin D3*) 1,000 Units Capsule      1000 UNITS PO QDAY, #30 CAP 0 Refills         Reported         12/18/19       metFORMIN ER (metFORMIN ER) 500 Mg Tab.er.24      500 MG PO QDAY, #30 TAB.ER 0 Refills         Reported         12/18/19       buPROPion HCl (Wellbutrin) 75 Mg Tablet      150 MG PO QDAY, TAB         Reported         3/14/19       Pantoprazole (Protonix*) 20 Mg Tablet      20 MG PO QDAY, #30 TAB 0 Refills         Reported         3/14/19       Potassium Chloride (K-Dur*) 10 Meq Tab.er.prt      10 MEQ PO QDAY, #30 TAB 0 Refills         Reported         3/14/19       Magnesium Oxide (Magnesium Oxide*) 400 Mg Tablet      400 MG PO QDAY, #30 TAB 0 Refills         Reported         3/14/19       Bumetanide (BUMETANIDE) 2 Mg Tablet      2 MG PO QDAY, #30 TAB         Reported         3/14/19       Warfarin Sod (Coumadin*) 5 Mg Tablet      5 MG PO QDAY, #30 TAB 0 Refills         Reported         3/14/19       Aspirin Chew (Aspirin Baby) 81 Mg Tab.chew      81 MG PO QDAY, #30 TAB.CHEW 0 Refills         Reported         3/14/19       Zolpidem Tartrate (Ambien) 5 Mg Tablet      10 MG PO HS PRN for SLEEP, #60 TAB 0 Refills         Reported         1/8/19       Ferrous Sulfate (Ferrous Sulfate*) 325 Mg Tablet      325 MG PO QDAY, #30 TAB 0 Refills         Reported         11/28/18       Atorvastatin (Atorvastatin) 80 Mg Tablet      80 MG PO HS, #30 TAB 0 Refills         Reported         7/23/18       Carvedilol (Carvedilol) 25 Mg Tablet      50 MG PO BID, #120 TAB 0 Refills         Reported         7/23/18       Sildenafil Citrate (Viagra) 50 Mg Tab      100 MG PO QDAY PRN  PRN for sex, TAB         Reported         18       Montelukast Sodium (Singulair*) 10 Mg Tablet      10 MG PO HS, #30 TAB 0 Refills         Reported         18       Cetirizine Hcl (zyrTEC) Unknown Strength Tablet      10 MG PO HS, #30 TAB 0 Refills         Reported         18      Current Medications      Current Medications Reviewed 19            EXAM      Vital Signs Reviewed.      General:  Well built, well nourished, in no acute distress.        HEENT: PERRL, EOMI.  OP, nares clear, no sinus tenderness.      Neck: Supple, no JVD, no thyromegaly.      Lymph: No axillary, cervical, supraclavicular lymphadenopathy noted bilaterally.      Chest: Lungs clear to auscultation bilaterally, no wheezes, rales or rhonchi,     normal work of breathing noted.        CV: RRR, no MGR, pulses 2+, equal.        Abd: Soft, NT, ND, +BS, no HSM.      EXT: No clubbing, no cyanosis, no edema, no joint tenderness.        Neuro:  A  Skin: No rashes or lesions.      Vtials      Vitals:             Height 5 ft 10 in / 177.8 cm           Weight 235 lbs  / 106.350270 kg           BSA 2.24 m2           BMI 33.7 kg/m2           Temperature 97.5 F / 36.39 C - Oral           Pulse 87           Respirations 16           Blood Pressure 129/74 Sitting, Left Arm           Pulse Oximetry 96%, room air            REVIEW      Results Reviewed      PCCS Results Reviewed?:  Yes Prev Lab Results, Yes Prev Radiology Results, Yes     Previous [facility] Records      Lab Results      I reviewed patient's noncontrast chest CT dated from 2019.      Radiographic Results               [facility]                PACS RADIOLOGY REPORT            Patient: MISHA BERRY   Acct: #[id number]   Report: #[id number]            UNIT #: [id number]    DOS: 19 1619      INSURANCE:[employer]    ORDER #:[id number]      LOCATION:ER     : 1970             PROVIDERS      ADMITTING:     ATTENDING:       FAMILY:  ELIZABETH LAGUNAS   ORDERING:  EFRME AVILES         OTHER:    DICTATING:  Nam Alston MD            REQ #:[id number]   EXAM:[facility] - CT CHEST without CONTRAST      REASON FOR EXAM:  Hemoptysis      REASON FOR VISIT:  COUGHING UP BLOOD            *******Signed******         PROCEDURE:   CT CHEST WITHOUT CONTRAST             COMPARISON:   [facility], CT, CHEST W/O CONTRAST, 6/08/2018,     15:18.  [facility], CT, CHEST W/O CONTRAST, 11/09/2018, 16:26.             INDICATIONS:   Hemoptysis             TECHNIQUE:   CT images were created without the administration of contrast     material.               PROTOCOL:     Standard imaging protocol performed                RADIATION:     DLP: 440 mGy*cm          Automated exposure control was utilized to minimize radiation dose.              FINDINGS:         Sternotomy wires are noted.  The patient has had mitral valve replacement.  On     evaluation of lung       windows there is a noncalcified pulmonary nodule in the right middle lobe best     seen on image 36       measuring 0.7 cm.  There is a small pulmonary nodule in the superior segment     right lower lobe       measuring 0.49 cm on image 35. These pulmonary nodules previously measured 0.64     cm and 0.3 cm       respectively.  No new pulmonary nodules are definitely identified.  There is a     small granuloma in       the lingula and right upper lobe.  There are no infiltrates.  There are no     pleural effusions.               CONCLUSION:         1. There are pulmonary nodules again noted which seem slightly more prominent in    size as compared       to prior study.  The findings include multiple, incidentally detected, solid     pulmonary nodules,       measuring between 6 and 8mm.  2017 guidelines from the Fleischner Society for     the follow-up and       management of newly detected indeterminate pulmonary nodules in  persons at least    35 years of age       depend on nodule size (average length and width) and underlying risk factors     (including smoking and       other risk factors). Please consider the following recommendations after     clinical assessment of       risk factors.  For 6-8mm nodules: In low risk patients, CT in 3 to 6 months,     then consider CT in 18       to 24 months.  In high risk patients, CT in 3 to 6 months, then obtain CT in 18     to 24 months.  Use       most suspicious nodule as guide to management.                ALYCIA CHIANG MD             Electronically Signed and Approved By: ALYCIA CHIANG MD on 3/30/2019 at 16:48                        Until signed, this is an unconfirmed preliminary report that may contain      errors and is subject to change.                                              [facility]      D:03/30/19 1648            Assessment      Multiple lung nodules on CT - R91.8            Notes      New Medications      * metFORMIN  MG TAB.ER.24: 500 MG PO QDAY #30      * CHOLECALCIFEROL (Vitamin D3*) 1,000 UNITS CAPSULE: 1,000 UNITS PO QDAY #30      * Lisinopril* 10 MG TABLET: 5 MG PO BID #30      * LUBIPROSTONE (Amitiza) 8 MCG CAP: 8 MCG PO BID #60      New Diagnostics      * Chest W/O Cont CT, SCHEDULED PROCEDURE         Dx: Multiple lung nodules on CT - R91.8      ASSESSMENT:       1.  Dyspnea on exertion, improved post mitral valve replacement.      2.  Hypertrophic obstructive cardiomyopathy, status post mitral valve     replacement, patient on Coumadin.        3. Moderate to severe mitral regurgitation, worse with exercise, improved since     mitral valve replacement.      4.  Atrial fibrillation, well-controlled, patient on Coumadin.      5. Seasonal allergies, well-controlled.      6. Multiple lung nodules.              PLAN:        1.  The patient's dyspnea has improved since having mitral valve replaced on     01/26/2019 per patient report. Patient is under the care of   Erlin and is on     Coumadin therapy now.  The patient is advised to follow up with Dr. Kern as     scheduled.       2. Patient had a noncontrast CT back in 03/2019 and it showed pulmonary nodules     noted again which seemed slightly more prominent in size compared to prior     study.  The patient is due for repeat chest CT. We will order repeat chest CT     today.      3. Seasonal allergies, patient is on Zyrtec and Singulair.  The patient is     advised to continue medications as prescribed.        4. Patient is up-to-date with flu vaccine, no indication for Prevnar or Pn    eumovax.        5. The patient is to follow up in one year with Dr. Henry, sooner if needed.            Electronically signed by LIZBETH DESHPANDE [facility]  12/20/2019 12:37       Disclaimer: Converted document may not contain table formatting or lab diagrams. Please see [employer] for the authenticated document.

## 2021-05-28 NOTE — PROGRESS NOTES
Patient: MISHA BERRY     Acct: [id number]     Report: #[id number]  UNIT #: [id number]     : 1970    Encounter Date:2018  PRIMARY CARE: ELIZABETH LAGUNAS  ***Signed***  --------------------------------------------------------------------------------------------------------------------  Chief Complaint      Encounter Date      Aug 9, 2018            Primary Care Provider      ELIZABETH LAGUNAS            Referring Provider      ELIZABETH LAGUNAS            Patient Complaint      Patient is complaining of      f/u cough            VITALS      Height 5 ft 10.00 in / 177.8 cm      Weight 233 lbs 6.000 oz / 105.141297 kg      BSA 2.32 m2      BMI 33.5 kg/m2      Temperature 98.2 F / 36.78 C - Oral      Pulse 77      Respirations 12      Blood Pressure 139/85 Sitting, Right Arm      Pulse Oximetry 96%, roomair      Exhaled Nitrous Oxide Testin            HPI      The patient is a very pleasant 47 year old -American male never smoking     [place] here for follow up for shortness of breath. Since last visit he did PFTs    which were unremarkable and chest CT was unremarkable. He states the Advair     actually made his breathing worse and stopped taking it. He still gets short of     breath when he walks on a treadmill, but gets worse when going up a flight of     steps or a hill.  No issues walking flat.  Symptoms are worse with exertion,     relieved with rest and moderate in severity.  He denies any coughing, wheezing,     chest pain or hemoptysis.  He denies any chest pain.  He says Dr. Kern did a     cardiac workup which was unremarkable.  Left heart cath was unremarkable. He     denies any heartburn or acid reflux.  Denies any nausea, vomiting, fevers,     chills, headaches or chest pain.  He is able to perform ADLs without difficulty.     Denies any swollen glands or lymph nodes of the head and neck.            I have personally reviewed the review of systems, past family, social, surgical      and medical histories and I agree with the findings.            ROS      Constitutional:  Denies: Fatigue, Fever, Weight gain, Weight loss, Chills,     Insomnia, Other      Respiratory/Breathing:  Complains of: Shortness of air; Denies: Wheezing, Cough,    Hemoptysis, Pleuritic pain, Other      Endocrine:  Denies: Polydipsia, Polyuria, Heat/cold intolerance, Diabetes, Other      Eyes:  Denies: Blurred vision, Vision Changes, Other      Ears, nose, mouth, throat:  Denies: Mouth lesions, Thrush, Throat pain,     Hoarseness, Allergies/Hay Fever, Post Nasal Drip, Headaches, Recent Head Injury,    Nose Bleeding, Neck Stiffness, Thyroid Mass, Hearing Loss, Ear Fullness, Dry     Mouth, Nasal or Sinus Pain, Dry Lips, Nasal discharge, Nasal congestion, Other      Cardiovascular:  Complains of: Dyspnea on Exertion; Denies: Palpitations,     Syncope, Claudication, Chest Pain, Wake up Gasping for air, Leg Swelling,     Irregular Heart Rate, Cyanosis, Other      Gastrointestinal:  Denies: Nausea, Constipation, Diarrhea, Abdominal pain,     Vomiting, Difficulty Swallowing, Reflux/Heartburn, Dysphagia, Jaundice,     Bloating, Melena, Bloody stools, Other      Genitourinary:  Denies: Urinary frequency, Incontinence, Hematuria, Urgency,     Nocturia, Dysuria, Testicular problems, Other      Musculoskeletal:  Denies: Joint Pain, Joint Stiffness, Joint Swelling, Myalgias,    Other      Hematologic/lymphatic:  DENIES: Lymphadenopathy, Bruising, Bleeding tendencies,     Other      Neurological:  Denies: Headache, Numbness, Weakness, Seizures, Other      Psychiatric:  Denies: Anxiety, Appropriate Effect, Depression, Other      Sleep:  No: Excessive daytime sleep, Morning Headache?, Snoring, Insomnia?, Stop    breathing at sleep?, Other      Integumentary:  Denies: Rash, Dry skin, Skin Warm to Touch, Other      Immunologic/Allergic:  Denies: Latex allergy, Seasonal allergies, Asthma,     Urticaria, Eczema, Other      Immunization  status:  No: Up to date            FAMILY/SOCIAL/MEDICAL HX      Surgical History:  Yes: Abdominal Surgery (COLONOSCOPY, EGD), Other Surgeries (2    cardiac ablations); No: Appendectomy, Bladder Surgery, Bowel Surgery, CABG,     Cholecystectomy, Head Surgery, Oral Surgery, Orthopedic Surgery, Vascular     Surgery      Diabetes - Family Hx:  Grandparent      Is Father Still Living?:  No      Is Mother Still Living?:  No       Family History:  Yes      Social History:  No Tobacco Use, No Alcohol Use, No Recreational Drug use      Smoking status:  Never smoker      Medical History:  Yes: Asthma, Diabetes, Hemorrhoids/Rectal Prob (reflux), High     Blood Pressure, Shortness Of Breath; No: Arthritis, Blood Disease,     Chemotherapy/Cancer, Chronic Bronchitis/COPD, Congestive Heart Failu, Deafness     or Ringing Ears, Seizures, Heart Attack, Sinus Trouble, Miscellaneous     Medical/oth      Psychiatric History      none            PREVENTION      Hx Influenza Vaccination:  Yes (02/18)      Date Influenza Vaccine Given:  Dec 1, 2017      Influenza Vaccine Declined:  No      2 or More Falls Past Year?:  No      Fall Past Year with Injury?:  No      Hx Pneumococcal Vaccination:  Yes (01/18)      Encouraged to follow-up with:  PCP regarding preventative exams.      Chart initiated by      grazyna fish            ALLERGIES/MEDICATIONS      Allergies:        Coded Allergies:             NO KNOWN ALLERGIES (Unverified , 8/9/18)      Medications    Last Reconciled on 8/9/18 16:56 by BARBARA KEITH MD      Aspirin (Aspirin*) 81 Mg Tab.chew      81 MG PO QDAY, #30 TAB.CHEW 0 Refills         Reported         7/23/18       Ibuprofen (Ibuprofen) 800 Mg Tablet      800 MG PO TID, #90 TAB 0 Refills         Reported         7/23/18       Esomeprazole Mag (NexIUM) 40 Mg Capsule      40 MG PO QDAY@07, #30 CAP 0 Refills         Reported         7/23/18       Atorvastatin (Atorvastatin) 80 Mg Tablet      80 MG PO HS, #30 TAB 0 Refills          Reported         7/23/18       Lisinopril/HCTZ (Zestoretic 10/12.5 MG*) 1 Tab Tablet      1 TAB PO QDAY for 30 Days, #30 TAB         Reported         7/23/18       Dabigatran (Pradaxa) 150 Mg Capsule      150 MG PO BID, #60 CAP         Reported         7/23/18       Carvedilol (Carvedilol) 25 Mg Tablet      50 MG PO BID, #120 TAB 0 Refills         Reported         7/23/18       Sildenafil Citrate (Viagra) 50 Mg Tab      100 MG PO QDAY PRN Y for sex, TAB         Reported         5/17/18       Esomeprazole Mag (NexIUM*) 40 Mg Suspdr.pkt      40 MG PO QDAY@07, #30 PACKET 0 Refills         Reported         5/17/18       Metformin ER* (Glucophage XR*) 500 Mg Tab.er.24h      500 MG PO BID, #60 TAB.ER 0 Refills         Reported         5/17/18       Montelukast Sodium (Singulair*) 10 Mg Tablet      10 MG PO HS, #30 TAB 0 Refills         Reported         5/17/18       Cetirizine Hcl (ZyrTEC*) Unknown Strength Tablet      10 MG PO HS, #30 TAB 0 Refills         Reported         5/17/18       Methocarbamol (Robaxin) 500 Mg Tab      2 TAB PO BID, TAB         Reported         2/19/11      Current Medications      Current Medications Reviewed 8/9/18            EXAM      Vital Signs Reviewed.      General:  WDWN, Alert, NAD.      HEENT: PERRL, EOMI.  OP, nares clear, no sinus tenderness.      Chest: Good aeration, clear to auscultation bilaterally, tympanic to percussion     bilaterally, no work of breathing noted.      CV: RRR, no MGR, pulses 2+, equal.        Abd: Soft, NT, ND, +BS, no HSM.      EXT: No clubbing, no cyanosis, no edema, no joint tenderness.        Neuro:  A  Skin: No rashes or lesions.      Vtials      Vitals:             Height 5 ft 10.00 in / 177.8 cm           Weight 233 lbs 6.000 oz / 105.636764 kg           BSA 2.32 m2           BMI 33.5 kg/m2           Temperature 98.2 F / 36.78 C - Oral           Pulse 77           Respirations 12           Blood Pressure 139/85 Sitting, Right Arm           Pulse  Oximetry 96%, roomair            REVIEW      Results Reviewed      PCCS Results Reviewed?:  Yes Prev Lab Results, Yes Prev Radiology Results, Yes     Previous Mecial Records      Lab Results      I reviewed my last office note.      Radiographic Results               [facility]                PACS RADIOLOGY REPORT            Patient: MISHA BERRY   Acct: #[id number]   Report: #[id number]            UNIT #: [id number]    DOS: 18 1511      INSURANCE:[employer]   ORDER #:[id number]      LOCATION:CT     : 1970            PROVIDERS      ADMITTING:     ATTENDING: BARBARA KEITH      FAMILY:  ELIZABETH LAGUNAS   ORDERING:  BARBARA KEITH         OTHER:    DICTATING:  Jaden Degroot MD            REQ #:[id number]   EXAM:CHWO - CT CHEST without CONTRAST      REASON FOR EXAM:  COUGH      REASON FOR VISIT:  COUGH            *******Signed******         PROCEDURE:   CT CHEST WITHOUT CONTRAST             COMPARISON:   None.             INDICATIONS:   COUGH, DYSPNEA, WHEEZING, MIDDLE CHEST PAIN             TECHNIQUE:   CT images were created without the administration of contrast     material.               PROTOCOL:     Standard imaging protocol performed                RADIATION:     DLP: 870 mGy*cm          Automated exposure control was utilized to minimize radiation dose.              FINDINGS:         Scans through the lung parenchyma show no acute infiltrates.  There is a 6 mm     sharply marginated       pulmonary nodule in the right middle lobe.  There is a 4 mm noncalcified     pulmonary nodule in the       superior segment of the right lower lobe.  There are no other significant     pulmonary nodules or       masses identified.  No pleural fluid is identified.  There are no mediastinal     masses or       lymphadenopathy.  The upper abdomen appears normal.             CONCLUSION:   There are 2 pulmonary nodules in the right lung measuring 6  mm and    4 mm in diameter.        These are not definitely calcified.  Suggest a followup scan in 3-6 months to     ensure stability of       the nodules.  Chest CT is otherwise negative.              Jaden Degroot MD             Electronically Signed and Approved By: Jaden Degroot MD on 6/08/2018 at 16:32                           Until signed, this is an unconfirmed preliminary report that may contain      errors and is subject to change.                                              KLIJO:      D:06/08/18 1632      PFT Results      [id number]  [id number]                                 [facility]                          [employer]                            [place], [place]  [address]               __________________________________________________________________________             Patient Name:                   Attending Physician:      Kevin Clifton M.D.             Patient Visit # MR #            Admit Date  Disch Date     Location      [id number]    [id number]      06/08/2018                 CT- -             Date of Birth      1970      __________________________________________________________________________      821 - DIAGNOSTIC REPORT             PULMONARY FUNCTION TEST:             DATE:      06/08/2018.             SPIROMETRY:      No obstructive lung defect is noted.  FEV1 3.29 L/96% predicted.  No      bronchodilator response noted.  Flow volume loop normal.             LUNG VOLUMES:      No evidence of restriction or hyperinflation.  ERV is reduced, consistent      with underlying obesity.             DIFFUSION CAPACITY:      Diffusion capacity within normal limits at 81% predicted.             OVERALL IMPRESSION:      No evidence of obstruction or restriction.  No bronchodilator response noted.      ERV reduced, consistent with underlying obesity.  Diffusion capacity within      normal  limits.             To be electronically signed in [employer] [facility]      [id number] BARBARA KEITH M.D.             AM:savannah      D:  06/19/2018 15:39      T:  06/19/2018 16:38      #[id number]             Until signed, this is an unconfirmed preliminary report that may contain      errors and is subject to change.                   06/20/18 1103  <Electronically signed by BARBARA KEITH MD>            Assessment      EUCEDA (dyspnea on exertion) - R06.09            Obesity (BMI 30.0-34.9) - E66.9            Arrhythmia         Cardiac arrhythmia, unspecified cardiac arrhythmia type - I49.9         Arrhythmia type: unspecified cardiac arrhythmia            Multiple lung nodules on CT - R91.8            Notes      New Diagnostics      * Cardiopulm Metabolic Stress Te, SCHEDULED PROCEDURE         Dx: EUCEDA (dyspnea on exertion) - R06.09      * Chest W/O Cont CT, 3 Months         Dx: EUCEDA (dyspnea on exertion) - R06.09      IMPRESSION:      1.  Dyspnea of unclear etiology.      2.  Never smoker.      3.  History of valvular heart disease.      4. Arrhythmias.      5. Seasonal allergies well-controlled.         6. Obesity with BMI 33.5.        7.  Solitary lung nodule 3 mm noncalcified nodule in the right lung.              PLAN:      1.  The patient's pulmonary function test and chest CT is unremarkable and per     patient's and Dr. Kern's notes cardiac workup unremarkable other than some     occasional arrhythmias and leaky heart valves.      2. At this time, the patient did respond to inhalers and I am not clear at this     time if his dyspnea is related to cardiac or pulmonary etiologies. At this point    I do favor cardiac given his PFTs.      3. I am going to set the patient up with a cardiopulmonary exercise test up in     [place] to better discern the etiology of his dyspnea. He is open to this and    is willing to travel to have this test performed.        4.  The patient needs a noncontrast chest CT in three months to follow up his      solitary 3 mm lung nodule.      5. Up-to-date with flu vaccine.  No indication for Prevnar and Pneumovax.        6. We will have patient follow up in three months after his CPET and chest CT.        7. Diet and exercise counseling provided today. I encouraged patient to be     active and try to improve his breathlessness.  I spent four minutes counseling     the patient today on low fat diet of 1800 calories and 30 minutes minimum of     daily exercise. He verbalized understanding.            Patient Education      ACO BMI High above 25:  Counseling Given, Encouraged weight loss, Encourage     dietary changes      Other Education:  CT and PFT results                 Disclaimer: Converted document may not contain table formatting or lab diagrams. Please see [employer] for the authenticated document.

## 2021-05-28 NOTE — PROGRESS NOTES
Patient: MISHA BERRY     Acct: [id number]     Report: #[id number]  UNIT #: [id number]     : 1970    Encounter Date:2018  PRIMARY CARE: ELIZABETH LAGUNAS  ***Signed***  --------------------------------------------------------------------------------------------------------------------  Chief Complaint      Encounter Date      May 17, 2018            Primary Care Provider      ELIZABETH LAGUNAS            Referring Provider      ELIZABETH LAGUNAS            Patient Complaint      Patient is complaining of      asthma            VITALS      Height 5 ft 10 in / 177.8 cm      Weight 243 lbs 1 oz / 110.357211 kg      BSA 2.37 m2      BMI 34.9 kg/m2      Temperature 98.9 F / 37.17 C - Oral      Pulse 80      Respirations 14      Blood Pressure 117/70 Sitting, Left Arm      Pulse Oximetry 92%, roomair      Exhaled Nitrous Oxide Testin            HPI      The patient is a very pleasant 47 year old never smoking -American male      who is here for presentation for shortness of breath. He has seen Dr. Henry in the past and a pulmonologist in [place].  I have no records of none     of these.  He is here for breathlessness.  He gets short of breath going up a     flight of steps or going up a hill, but not short of breath with walking flat,     worse with exertion and relieved with rest. He describes mild to moderate in     severity with no associated chest pain or hempotysis.  He does have occasional     cough productive of thin, white sputum.  He denies any wheezing. He does have     seasonal allergies that are well controlled with Singulair and denies any     recent itchy-scratchy throat, watery eyes or nasal congestion.  He denies any     acid reflux or heartburn.  Denies any nausea, vomiting, fevers, chills,     headaches, chest pain or hemoptysis.  He is able to perform ADLs without     difficulty.  Denies any swollen glands or lymph nodes of the head and neck.  He     denies any  orthopnea, weight gain or PND.  He does have obstructive sleep apnea     well-controlled with his home CPAP and denies excessive daytime sleepiness or     snoring.  He did see Dr. Kern who did a EKG that was unremarkable. He     reportedly had an echocardiogram done showing some leaky valves. I do not have     this report.  He has never been on inhalers, but he reportedly has been told he     has asthma.            I have personally reviewed the review of systems, past family, social, surgical     and medical histories and I agree with the findings.            ROS      Constitutional:  Denies: Fatigue, Fever, Weight gain, Weight loss, Chills,     Insomnia, Other      Respiratory/Breathing:  Complains of: Shortness of air, Denies: Wheezing, Cough    , Hemoptysis, Pleuritic pain, Other      Endocrine:  Denies: Polydipsia, Polyuria, Heat/cold intolerance, Diabetes, Other      Eyes:  Denies: Blurred vision, Vision Changes, Other      Ears, nose, mouth, throat:  Denies: Mouth lesions, Thrush, Throat pain,     Hoarseness, Allergies/Hay Fever, Post Nasal Drip, Headaches, Recent Head Injury    , Nose Bleeding, Neck Stiffness, Thyroid Mass, Hearing Loss, Ear Fullness, Dry     Mouth, Nasal or Sinus Pain, Dry Lips, Nasal discharge, Nasal congestion, Other      Cardiovascular:  Denies: Palpitations, Syncope, Claudication, Chest Pain, Wake     up Gasping for air, Leg Swelling, Irregular Heart Rate, Cyanosis, Dyspnea on     Exertion, Other      Gastrointestinal:  Denies: Nausea, Constipation, Diarrhea, Abdominal pain,     Vomiting, Difficulty Swallowing, Reflux/Heartburn, Dysphagia, Jaundice, Bloating    , Melena, Bloody stools, Other      Genitourinary:  Denies: Urinary frequency, Incontinence, Hematuria, Urgency,     Nocturia, Dysuria, Testicular problems, Other      Musculoskeletal:  Denies: Joint Pain, Joint Stiffness, Joint Swelling, Myalgias    , Other      Hematologic/lymphatic:  DENIES: Lymphadenopathy, Bruising, Bleeding  tendencies,     Other      Neurological:  Denies: Headache, Numbness, Weakness, Seizures, Other      Psychiatric:  Denies: Anxiety, Appropriate Effect, Depression, Other      Sleep:  No: Excessive daytime sleep, Morning Headache?, Snoring, Insomnia?,     Stop breathing at sleep?, Other      Integumentary:  Denies: Rash, Dry skin, Skin Warm to Touch, Other      Immunologic/Allergic:  Denies: Latex allergy, Seasonal allergies, Asthma,     Urticaria, Eczema, Other      Immunization status:  No: Up to date            FAMILY/SOCIAL/MEDICAL HX      Surgical History:  Yes: Other Surgeries (2 cardiac ablations)      Diabetes - Family Hx:  Grandparent      Is Father Still Living?:  No      Is Mother Still Living?:  No       Family History:  Yes      Social History:  No Tobacco Use, No Alcohol Use, No Recreational Drug use      Smoking status:  Never smoker      Medical History:  Yes: Asthma, High Blood Pressure, Shortness Of Breath, No:     Blood Disease, Chemotherapy/Cancer, Hemorrhoids/Rectal Prob            PREVENTION      Hx Influenza Vaccination:  Yes      Date Influenza Vaccine Given:  Dec 1, 2017      2 or More Falls Past Year?:  No      Fall Past Year with Injury?:  No      Hx Pneumococcal Vaccination:  Yes      Encouraged to follow-up with:  PCP regarding preventative exams.      Chart initiated by      grazyna fish            ALLERGIES/MEDICATIONS      Allergies:        Coded Allergies:             NO KNOWN ALLERGIES (Unverified , 5/17/18)      Medications    Last Reconciled on 5/17/18 16:16 by BARBARA KEITH MD MDI-Advair 500/50 (Advair 500/50 Diskus) 1 Each Blst.w.dev      1 PUFF INH RTBID, #1 INH 4 Refills         Prov: BARBARA KEITH         5/17/18       Lisinopril/HCTZ (Lisinopril/HCTZ 20/12.5 MG*) Unknown Strength Tablet      PO QDAY, #30 TAB 0 Refills         Reported         5/17/18       Sildenafil Citrate (Viagra) 50 Mg Tab      25 MG PO QDAY PRN Y for sex, TAB         Reported         5/17/18        Esomeprazole Mag (NexIUM*) 40 Mg Suspdr.pkt      40 MG PO QDAY@07, #30 PACKET 0 Refills         Reported         5/17/18       Metformin Hcl (Glucophage XR*) 500 Mg Tab.er.24h      500 MG PO BID, #60 TAB.ER 0 Refills         Reported         5/17/18       Montelukast Sodium (Singulair*) 10 Mg Tablet      10 MG PO HS, #30 TAB 0 Refills         Reported         5/17/18       Cetirizine Hcl (ZyrTEC*) Unknown Strength Tablet      PO HS, #30 TAB 0 Refills         Reported         5/17/18       Med Rec Complete (Med Rec Complete)  Comment      1 EA PO 02- SL         Reported         2/19/11       Methocarbamol (Robaxin) 500 Mg Tab      2 TAB PO BID, TAB         Reported         2/19/11       Lisinopril* (Lisinopril*) 30 Mg Tablet      1 TAB PO QDAY         Reported         2/19/11       Carvedilol Phosphate (Coreg CR) 40 Mg Cap.er.24h      1 TAB PO DAILY, CAP         Reported         2/19/11      Current Medications      Current Medications Reviewed 5/17/18            EXAM      Vital Signs Reviewed.      General:  WDWN, Alert, NAD.      HEENT: PERRL, EOMI.  OP, nares clear, no sinus tenderness.      Neck: Supple, no JVD, no thyromegaly.      Lymph: No axillary, cervical, supraclavicular lymphadenopathy noted bilaterally.      Chest: Good aeration, clear to auscultation bilaterally, tympanic to percussion     bilaterally, no work of breathing noted.      CV: RRR, no MGR, pulses 2+, equal.        Abd: Soft, NT, ND, +BS, no HSM.      EXT: No clubbing, no cyanosis, trace pedal edema, no joint tenderness.        Neuro:  A  Skin: No rashes or lesions.      Vtials      Vitals:             Height 5 ft 10 in / 177.8 cm           Weight 243 lbs 1 oz / 110.472375 kg           BSA 2.37 m2           BMI 34.9 kg/m2           Temperature 98.9 F / 37.17 C - Oral           Pulse 80           Respirations 14           Blood Pressure 117/70 Sitting, Left Arm           Pulse Oximetry 92%, roomair            REVIEW      Results  Reviewed      PCCS Results Reviewed?:  Yes Prev Lab Results, Yes Prev Radiology Results, Yes     Previous [facility] Records      Lab Results      I also reviewed labs showing no peripheral eosinophilia and no evidence of     chronic hypercapnic respiratory failure.      Radiographic Results               [facility]                PACS RADIOLOGY REPORT            Patient: MISHA BERRY   Acct: #[id number]   Report: #[id number]            UNIT #: [id number]    DOS: 18 1147      INSURANCE:[employer]   ORDER #:[id number]      LOCATION:ER     : 1970            PROVIDERS      ADMITTING:     ATTENDING:       FAMILY:  [facility]   ORDERING:  BABS CRUZ         OTHER:    DICTATING:  Molly Del Real MD            REQ #:[id number]   EXAM:CXR1 - CHEST 1 View AP PA      REASON FOR EXAM:  Palpitations      REASON FOR VISIT:  ABNORMAL EKG            *******Signed******         PROCEDURE:   CHEST AP/PA SINGLE VIEW             COMPARISON:   [facility], , CHEST PA/AP   21.             INDICATIONS:   Palpitations             FINDINGS:         The lungs are clear. The heart size and pulmonary vascularity are normal.      CONCLUSION:   Clear lungs.              MOLLY DEL REAL MD             Electronically Signed and Approved By: MOLLY DEL REAL MD on 2018 at 12:14                   Until signed, this is an unconfirmed preliminary report that may contain      errors and is subject to change.                                              SCHJ1:      D:18 1214            Assessment      EUCEDA (dyspnea on exertion) - R06.09            Cough - R05            Wheeze - R06.2            Never smoker            Notes      New Medications      * Cetirizine Hcl (ZyrTEC*) Unknown Strength TABLET: PO HS #30      * MONTELUKAST SODIUM (Singulair*) 10 MG TABLET: 10 MG PO HS #30      * Metformin Hcl (Glucophage XR*) 500 MG  TAB.ER.24H: 500 MG PO BID #60      * Esomeprazole Mag (NexIUM*) 40 MG SUSPDR.PKT: 40 MG PO QDAY@07 #30       Instructions: Take on an empty stomach, one hour before or two hours after     meal.      * Sildenafil Citrate (Viagra) 50 MG TAB: 25 MG PO QDAY PRN sex      * Lisinopril/HCTZ (Lisinopril/HCTZ 20/12.5 MG*) Unknown Strength TABLET: PO     QDAY #30      * MDI-Advair 500/50 (Advair 500/50 Diskus) 1 EACH BLST.W.DEV: 1 PUFF INH RTBID #    1       Dx: EUCEDA (dyspnea on exertion) - R06.09      Discontinued Medications      * Prednisone (predniSONE*) 50 MG TAB: 1 TAB PO QDAY #4      * Promethazine/Dextromethorphan 118 ML ML: 5 ML PO Q4HPRN #120      New Diagnostics      * PFT-Comp, PrePost,DLCO,BodyBox, Week       Dx: EUCEDA (dyspnea on exertion) - R06.09      * Chest W/O Cont CT, SCHEDULED PROCEDURE       Dx: EUCEDA (dyspnea on exertion) - R06.09      IMPRESSION:      1.  Dyspnea on exertion of unclear etiology. Favor pulmonary etiology with     possible asthma, but cannot rule out cardiology etiology at this time.        2.  Cough.      3.  Never smoker.      4.  History of valvular heart disease per patient.        5.  Seasonal allergies, well-controlled.              PLAN:      1.  I performed exhale nitric oxide testing in the office today.  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       2.  We will do trial of steroid with Advair 500/50 one puff twice daily.      Inhaler education provided today.      3.  We will check PFTs to assess for airflow obstruction and bronchodilator     response.      4. Check noncontrast chest CT to assess for parenchymal lung disease.      5.  We will get records from Dr. Henry's office.      6. Get echocardiogram from Dr. Kern's office.      7. He is up-to-date with flu vaccine.  No indication for Prevnar and Pneumovax.      8.  I will have patient follow up in 2-3 months to reassess symptoms and     discuss test results. If there is no  improvement in symptoms with inhaled     corticosteroid, may need further workup for cardiac etiology or further workup     for dyspnea including methacholine challenge testing or possible     cardiopulmonary exercise testing.            Patient Education      Patient Education Provided:  How to use an Inhaler            Patient Education:        Asthma -- Adult                 Disclaimer: Converted document may not contain table formatting or lab diagrams. Please see [facility] for the authenticated document.

## 2021-05-28 NOTE — PROGRESS NOTES
Patient: MISHA BERRY     Acct: [id number]     Report: #[id number]  UNIT #: [id number]     : 1970    Encounter Date:2018  PRIMARY CARE: ELIZABETH LAGUNAS  ***Signed***  --------------------------------------------------------------------------------------------------------------------  Chief Complaint      Encounter Date      2018            Primary Care Provider      ELIZABETH LAGUNAS            Referring Provider      ELIZABETH LAGUNAS            Patient Complaint      Patient is complaining of      3 mo f/u chest ct results            VITALS      Height 5 ft 10 in / 177.8 cm      Weight 242 lbs 3 oz / 109.940635 kg      BSA 2.26 m2      BMI 34.7 kg/m2      Temperature 98.1 F / 36.72 C - Oral      Pulse 70      Respirations 12      Blood Pressure 145/96 Sitting, Left Arm      Pulse Oximetry 98%, roomair      Initial Exhaled Nitrous Oxide      Exhaled Nitrous Oxide Results:  32            HPI      The patient is a very pleasant 48 year old  male never smoker,     [place] with atrial fibrillation here for follow up.             I did a CPET since his last office visit which showed no pulmonary limitations,     just showed cardiac limitations. I discussed the case with Dr. Kern who did a TE    which showed moderate severe MR worse with exertion and showed some component of    obstructive hypertrophic cardiomyopathy. He is increasing beta blockers to keep     his heart rate down to relieve obstruction and if it does get worse in the     future, he may end up needing a myectomy. The patient denies any coughing,     wheezing, chest pain or hemoptysis. He still gets shortness of breath with     exertion but not as much now that he is on rate controlling agents. He denies     any chest palpitations. He still gets short of breath going up 2-3 flights of     stairs but it is less severe and mild in severity. It is worse with exertion,     better with rest. He denies any nausea and vomiting,  fevers or chills, headaches    or hemoptysis or chest pain. He is able to perform his activities of daily     living without difficulty and denies any swollen lymph nodes or glands in his     head and neck.             I have personally reviewed the Review of Systems, past family, social, surgical     and medical histories and I agree with the findings.            ROS      Constitutional:  Denies: Fatigue, Fever, Weight gain, Weight loss, Chills,     Insomnia, Other      Respiratory/Breathing:  Complains of: Shortness of air, Wheezing, Cough; Denies:    Hemoptysis, Pleuritic pain, Other      Endocrine:  Denies: Polydipsia, Polyuria, Heat/cold intolerance, Diabetes, Other      Eyes:  Denies: Blurred vision, Vision Changes, Other      Ears, nose, mouth, throat:  Denies: Mouth lesions, Thrush, Throat pain,     Hoarseness, Allergies/Hay Fever, Post Nasal Drip, Headaches, Recent Head Injury,    Nose Bleeding, Neck Stiffness, Thyroid Mass, Hearing Loss, Ear Fullness, Dry     Mouth, Nasal or Sinus Pain, Dry Lips, Nasal discharge, Nasal congestion, Other      Cardiovascular:  Denies: Palpitations, Syncope, Claudication, Chest Pain, Wake     up Gasping for air, Leg Swelling, Irregular Heart Rate, Cyanosis, Dyspnea on     Exertion, Other      Gastrointestinal:  Denies: Nausea, Constipation, Diarrhea, Abdominal pain,     Vomiting, Difficulty Swallowing, Reflux/Heartburn, Dysphagia, Jaundice,     Bloating, Melena, Bloody stools, Other      Genitourinary:  Denies: Urinary frequency, Incontinence, Hematuria, Urgency,     Nocturia, Dysuria, Testicular problems, Other      Musculoskeletal:  Denies: Joint Pain, Joint Stiffness, Joint Swelling, Myalgias,    Other      Hematologic/lymphatic:  DENIES: Lymphadenopathy, Bruising, Bleeding tendencies,     Other      Neurological:  Denies: Headache, Numbness, Weakness, Seizures, Other      Psychiatric:  Denies: Anxiety, Appropriate Effect, Depression, Other      Sleep:  No: Excessive  daytime sleep, Morning Headache?, Snoring, Insomnia?, Stop    breathing at sleep?, Other      Integumentary:  Denies: Rash, Dry skin, Skin Warm to Touch, Other      Immunologic/Allergic:  Denies: Latex allergy, Seasonal allergies, Asthma,     Urticaria, Eczema, Other      Immunization status:  No: Up to date            FAMILY/SOCIAL/MEDICAL HX      Surgical History:  Yes: Abdominal Surgery (COLONOSCOPY, EGD), Other Surgeries (2    cardiac ablations); No: Appendectomy, Bladder Surgery, Bowel Surgery, CABG,     Cholecystectomy, Head Surgery, Oral Surgery, Orthopedic Surgery, Vascular     Surgery      Diabetes - Family Hx:  Grandparent      Is Father Still Living?:  No      Is Mother Still Living?:  No       Family History:  Yes      Social History:  No Tobacco Use, No Alcohol Use, No Recreational Drug use      Smoking status:  Never smoker      Anticoagulation Therapy:  No      Antibiotic Prophylaxis:  No      Medical History:  Yes: Asthma, Diabetes, Hemorrhoids/Rectal Prob (reflux), High     Blood Pressure, Shortness Of Breath; No: Arthritis, Blood Disease,     Chemotherapy/Cancer, Chronic Bronchitis/COPD, Congestive Heart Failu, Deafness     or Ringing Ears, Seizures, Heart Attack, Sinus Trouble, Miscellaneous     Medical/oth      Psychiatric History      none            PREVENTION      Hx Influenza Vaccination:  Yes (02/18)      Date Influenza Vaccine Given:  Nov 1, 2018      Influenza Vaccine Declined:  No      2 or More Falls Past Year?:  No      Fall Past Year with Injury?:  No      Hx Pneumococcal Vaccination:  Yes (01/18)      Encouraged to follow-up with:  PCP regarding preventative exams.      Chart initiated by      grazyna fish            ALLERGIES/MEDICATIONS      Allergies:        Coded Allergies:             NO KNOWN ALLERGIES (Unverified , 11/28/18)      Medications    Last Reconciled on 11/28/18 16:22 by BARBARA KEITH MD      Ferrous Sulfate (Ferrous Sulfate*) 325 Mg Tablet      325 MG PO QDAY,  #30 TAB 0 Refills         Reported         11/28/18       Lisinopril* (Lisinopril*) 10 Mg Tablet      10 MG PO QDAY, #30 TAB 0 Refills         Reported         11/28/18       Aspirin Chew (Aspirin Chew) 81 Mg Tab.chew      81 MG PO QDAY, #30 TAB.CHEW 0 Refills         Reported         7/23/18       Ibuprofen (Ibuprofen) 800 Mg Tablet      800 MG PO TID, #90 TAB 0 Refills         Reported         7/23/18       Atorvastatin (Atorvastatin) 80 Mg Tablet      80 MG PO HS, #30 TAB 0 Refills         Reported         7/23/18       Dabigatran (Pradaxa) 150 Mg Capsule      150 MG PO BID, #60 CAP         Reported         7/23/18       Carvedilol (Carvedilol) 25 Mg Tablet      50 MG PO BID, #120 TAB 0 Refills         Reported         7/23/18       Sildenafil Citrate (Viagra) 50 Mg Tab      100 MG PO QDAY PRN PRN for sex, TAB         Reported         5/17/18       Esomeprazole Mag (NexIUM*) 40 Mg Suspdr.pkt      40 MG PO QDAY@07, #30 PACKET 0 Refills         Reported         5/17/18       Metformin ER* (Glucophage XR*) 500 Mg Tab.er.24h      500 MG PO BID, #60 TAB.ER 0 Refills         Reported         5/17/18       Montelukast Sodium (Singulair*) 10 Mg Tablet      10 MG PO HS, #30 TAB 0 Refills         Reported         5/17/18       Cetirizine Hcl (ZyrTEC) Unknown Strength Tablet      10 MG PO HS, #30 TAB 0 Refills         Reported         5/17/18       Methocarbamol (Robaxin) 500 Mg Tab      2 TAB PO BID, TAB         Reported         2/19/11      Current Medications      Current Medications Reviewed 11/28/18            EXAM      Vital Signs Reviewed.      General:  WDWN, Alert, NAD.      HEENT: PERRL, EOMI.  OP, nares clear, no sinus tenderness.      Chest: Good aeration, clear to auscultation bilaterally, tympanic to percussion     bilaterally, no work of breathing noted.      CV: RRR, no MGR, pulses 2+, equal.        Abd: Soft, NT, ND, +BS, no HSM.      EXT: No clubbing, no cyanosis, no edema, no joint tenderness.         Neuro:  A  Skin: No rashes or lesions.      Vtials      Vitals:             Height 5 ft 10 in / 177.8 cm           Weight 242 lbs 3 oz / 109.033395 kg           BSA 2.26 m2           BMI 34.7 kg/m2           Temperature 98.1 F / 36.72 C - Oral           Pulse 70           Respirations 12           Blood Pressure 145/96 Sitting, Left Arm           Pulse Oximetry 98%, roomair            REVIEW      Results Reviewed      PCCS Results Reviewed?:  Yes Prev Radiology Results, Yes Previous Mecial Records    (I personally reviewed Dr. Kern's last office visit notes as well as the TE     performed by Dr. Kern.  )      Micro Results      Patient: KEVIN BERRY   Acct: #[id number]   Report: #[id number]            UNIT #: [id number]    DOS:       LOCATION:[facility]     : 1970            PROVIDERS      ADMITTING:     FAMILY:  MD NONE         OTHER:       DICTATING:  DWAYNE KERN MD            REASON FOR VISIT:  MITRAL VALVE REGURGITATION            *******Signed******                                    [facility]                          Health Information Management Services                            [place] [phone]               __________________________________________________________________________             Patient Name:                   Attending Physician:      Kevin Berry M.D.             Patient Visit # MR #            Admit Date  Disch Date     Location      [id number]    [id number]      10/29/2018                 [facility]- -             Date of Birth      1970      __________________________________________________________________________      0835 - DIAGNOSTIC REPORT             TRANSESOPHAGEAL ECHOCARDIOGRAM             DATE:      10/29/2018.             INDICATION:      Shortness of breath and mitral valve regurgitation.             DESCRIPTION OF PROCEDURE:      After the risks, benefits and alternatives were explained he underwent       conscious sedation.  The patient also did undergo infusion with dobutamine to      assess regurgitation and outflow obstruction as the patient became more      hyperdynamic.  He tolerated the procedure well.  There were no immediate      complications.             RESULTS:      1.  MITRAL VALVE:      Appeared to have a degree of mild prolapse. There did          appear to be at least moderate to severe mitral regurgitation with          dobutamine infusion but, given the turbulence and somewhat eccentric          nature, difficult to fully quantify severity.      2.  AORTIC VALVE:      Trileaflet with no significant stenosis or          regurgitation.  There was a degree of outflow obstruction seen.  The peak          velocity was 436 cm/s.  The peak gradient was 76 mm/s, though it was          difficult to fully line up the probe and get a well covered Doppler          envelope.  There was a mean gradient of 40 mmHg.      3.  TRICUSPID VALVE:   Appeared to be normal.      4.  PULMONIC VALVE:    Not well seen.      5.  LEFT ATRIUM:       Moderately enlarged.  Left atrial appendage was free          of any thrombus.      6.  LEFT VENTRICLE:    Appeared to have a hyperdynamic ejection fraction          greater than 60% with left ventricular hypertrophy.      7.  RIGHT ATRIUM:      Normal.  There was no right-to-left shunting with          agitated saline injection.      8.  RIGHT VENTRICLE:   Normal in size and function.      9.  PERICARDIUM:       No obvious effusion.             IMPRESSION:      1.  Hyperdynamic ejection fraction greater than 60%.      2.  Left ventricular hypertrophy.      3.  Hyperdynamic outflow obstruction with a mean gradient of 40 mmHg.      4.  Mitral regurgitation accentuated during hyperdynamic state, at least          moderate to severe, possibly severe in nature.  Given the patient's          hyperdynamic state, it was difficult to quantify severity.             To be electronically  signed in [facility]      [id number] DWAYNE CUEVA M.D.             JH:rt      D:  10/29/2018 14:05      T:  10/29/2018 14:45      #[id number]             CC: BARBARA KEITH M.D.             Until signed, this is an unconfirmed preliminary report that may contain      errors and is subject to change.                   Until signed, this is an unconfirmed preliminary report that may contain      errors and is subject to change.                     <Electronically signed by DWAYNE CUEVA MD>                10/30/18 1426               DWAYNE CUEVA MDJ4:RJT      D:10/29/18 1405      Radiographic Results      I personally reviewed the patient's CPET from [facility] showing no     cardiac limitation, ST depression with exercise and findings consistent with     cardiac limitations of exercise.  I also reviewed chest CT scan done in November 2018 and compared it to the previous one showing no change in size in two  06 cm    right lung nodules.            Assessment      Multiple lung nodules on CT - R91.8            Obesity (BMI 30.0-34.9) - E66.9            Notes      New Medications      * Lisinopril* 10 MG TABLET: 10 MG PO QDAY #30      * FERROUS SULFATE (Ferrous Sulfate*) 325 MG TABLET: 325 MG PO QDAY #30      New Diagnostics      * Chest W/O Cont CT, Year         Dx: Multiple lung nodules on CT - R91.8      IMPRESSION:      1. Dyspnea on exertion secondary to valvular heart disease and obstructive ca    rdiomyopathy.        2. Hypertrophic obstructive cardiomyopathy.      3. Moderate to severe mitral regurgitation worse with exercise.       4. Atrial fibrillation well controlled now.       5. Seasonal allergies well controlled.       6. Obesity with BMI 34.7.      7. Multiple lung nodules noncalcified in the right lung 0.6 cm stable X 6 months    in this low risk patient.             PLAN:      1. I discussed the case with Dr. Cueva and he likely has cardiac  etiology of     dyspnea due to hypertrophic obstructive cardiomyopathy as well as mitral     regurgitation.       2. Continue beta blockers and up titration of them for heart rate control per     Dr. Kern. If patient continues to worsen, he may ultimately need a myectomy.      3. Continue current allergy agents.       4. Repeat noncontrast chest CT scan in 1 year to assess for 24 months stability     of pulmonary nodules per low risk patient 2017 Fleischner criteria.       5. Up to date with flu vaccine, no indication for Prevnar or Pneumovax.       6. Diet and exercise counseling provided today.       7. Follow up with me in 1 year.            Patient Education      ACO BMI High above 25:  Encouraged weight loss, Encourage dietary changes      Other Education:  CPET results. Valvular heart disease. Obstructive     cardiomyopathy                 Disclaimer: Converted document may not contain table formatting or lab diagrams. Please see [employer] for the authenticated document.

## 2021-06-05 NOTE — PROGRESS NOTES
"   Progress Note      Patient Name: Kevin Clifton   Patient ID: [id number]   Sex: Male   YOB: 1970    Primary Care Provider: Rickey GRIFFIN   Referring Provider: Chon Mckeon MD    Visit Date: May 26, 2021    Provider: Jaden Kern MD   Location: [facility]   Location Address: [address] [place] [address], [address]   [place]  [id number]   Location Phone: [phone]          Chief Complaint     Hypertrophic cardiomyopathy.       History Of Present Illness  REFERRING CARE PROVIDER: Chon Mckeon MD   Kevin Clifton is a 50 year old /Black male with hypertrophic cardiomyopathy, status post mitral valve replacement and myomectomy and paroxysmal atrial fibrillation, who has been doing well symptomatically. His shortness of breath symptoms have improved. No chest pain problems or edema. His weight is down nine pounds from last visit.   PAST MEDICAL HISTORY: Hypertrophic obstructive cardiomyopathy; Myomectomy of septal myocardium with mitral valve replacement; Hypertension; Paroxysmal atrial fibrillation with ablation.   PSYCHOSOCIAL HISTORY: Rarely consumes alcohol. Denies tobacco use.   CURRENT MEDICATIONS: Medication list was reviewed and is as documented.      ALLERGIES:  No known drug allergies.       Review of Systems  · Cardiovascular  o Denies  o : palpitations (fast, fluttering, or skipping beats), swelling (feet, ankles, hands), shortness of breath while walking or lying flat, chest pain or angina pectoris   · Respiratory  o Denies  o : chronic or frequent cough, asthma or wheezing      Vitals  Date Time BP Position Site L\R Cuff Size HR RR TEMP (F) WT  HT  BMI kg/m2 BSA m2 O2 Sat FR L/min FiO2 HC       05/26/2021 10:14 /88 Sitting    94 - R   235lbs 0oz 5'  10\" 33.72 2.29             Physical Examination  · Constitutional  o Appearance  o : Awake, alert, in no acute distress.   · Eyes  o Conjunctivae  o : Normal.  · Ears, Nose, Mouth and Throat  o Oral Cavity  o : "   § Oral Mucosa  § : Normal.  · Neck  o Inspection/Palpation  o : No JVD. Good carotid upstroke. No thyromegaly.  · Respiratory  o Respiratory  o : Good respiratory effort. Clear to percussion and auscultation.  · Cardiovascular  o Heart  o :   § Auscultation of Heart  § : He has a 1/6 systolic murmur and mechanical mitral valve sounds.   o Peripheral Vascular System  o :   § Extremities  § : Trace lower extremity edema bilaterally.   · Gastrointestinal  o Abdominal Examination  o : Soft. No tenderness or masses felt. No hepatosplenomegaly. Abdominal aorta is not palpable.          Assessment     ASSESSMENT AND PLAN:  1. Hypertrophic cardiomyopathy, symptomatically stable, status post myomectomy.  No evidence of any dysrhythmias.  Blood pressure well maintained.  Continue with Lisinopril/Hydrochlorothiazide 10/12.5 mg dosing.  2. Mechanical mitral valve replacement.  The patient is on anticoagulation with goal INR between 2.5 and 3.5, which the patient has been maintaining.    3. Paroxysmal atrial fibrillation.  No symptomatic recurrence and is in normal sinus rhythm.  Will continue with Coumadin dosing and serial EKGs.    MD SONI Aragon/arelis               Electronically Signed by: Cindy Palumbo-, Other -Author on May 28, 2021 01:02:44 PM  Electronically Co-signed by: Jaden Kern MD -Reviewer on May 28, 2021 02:58:38 PM

## 2021-07-07 ENCOUNTER — TELEPHONE (OUTPATIENT)
Dept: CARDIOLOGY | Facility: CLINIC | Age: 51
End: 2021-07-07

## 2021-07-07 NOTE — TELEPHONE ENCOUNTER
Called and left message stating patient is overdue to check his INR.  Advised to check as soon as possible or to call our office if another doctor is now monitoring.

## 2021-07-09 ENCOUNTER — LAB (OUTPATIENT)
Dept: LAB | Facility: HOSPITAL | Age: 51
End: 2021-07-09

## 2021-07-09 ENCOUNTER — TRANSCRIBE ORDERS (OUTPATIENT)
Dept: LAB | Facility: HOSPITAL | Age: 51
End: 2021-07-09

## 2021-07-09 DIAGNOSIS — Z79.01 LONG TERM (CURRENT) USE OF ANTICOAGULANTS: Primary | ICD-10-CM

## 2021-07-09 DIAGNOSIS — Z79.01 LONG TERM (CURRENT) USE OF ANTICOAGULANTS: ICD-10-CM

## 2021-07-09 LAB
INR PPP: 4.64 (ref 2–3)
PROTHROMBIN TIME: 46.7 SECONDS (ref 9.4–12)

## 2021-07-09 PROCEDURE — 36415 COLL VENOUS BLD VENIPUNCTURE: CPT

## 2021-07-09 PROCEDURE — 85610 PROTHROMBIN TIME: CPT

## 2021-07-12 ENCOUNTER — ANTICOAGULATION VISIT (OUTPATIENT)
Dept: CARDIOLOGY | Facility: CLINIC | Age: 51
End: 2021-07-12

## 2021-07-12 DIAGNOSIS — Z95.2 HX OF MITRAL VALVE REPLACEMENT WITH MECHANICAL VALVE: Primary | ICD-10-CM

## 2021-07-13 NOTE — PROGRESS NOTES
INR from 7/9 4.64 (unable to reach patient yesterday.    He's taking 7/7/8    Last INR was 2.54 on 5/25 (at that time he was taking 8/8/7    No antibiotics, diet changes, or med changes

## 2021-07-15 VITALS
SYSTOLIC BLOOD PRESSURE: 136 MMHG | WEIGHT: 235 LBS | HEIGHT: 70 IN | DIASTOLIC BLOOD PRESSURE: 88 MMHG | HEART RATE: 94 BPM | BODY MASS INDEX: 33.64 KG/M2

## 2021-07-16 ENCOUNTER — LAB (OUTPATIENT)
Dept: LAB | Facility: HOSPITAL | Age: 51
End: 2021-07-16

## 2021-07-16 ENCOUNTER — ANTICOAGULATION VISIT (OUTPATIENT)
Dept: CARDIOLOGY | Facility: CLINIC | Age: 51
End: 2021-07-16

## 2021-07-16 DIAGNOSIS — Z95.2 HX OF MITRAL VALVE REPLACEMENT WITH MECHANICAL VALVE: Primary | ICD-10-CM

## 2021-07-16 DIAGNOSIS — Z79.01 LONG TERM (CURRENT) USE OF ANTICOAGULANTS: ICD-10-CM

## 2021-07-16 LAB
INR PPP: 2.08 (ref 2–3)
PROTHROMBIN TIME: 21.2 SECONDS (ref 9.4–12)

## 2021-07-16 PROCEDURE — 85610 PROTHROMBIN TIME: CPT

## 2021-07-16 PROCEDURE — 36415 COLL VENOUS BLD VENIPUNCTURE: CPT

## 2021-08-05 ENCOUNTER — TELEPHONE (OUTPATIENT)
Dept: CARDIOLOGY | Facility: CLINIC | Age: 51
End: 2021-08-05

## 2021-08-06 ENCOUNTER — LAB (OUTPATIENT)
Dept: LAB | Facility: HOSPITAL | Age: 51
End: 2021-08-06

## 2021-08-06 DIAGNOSIS — Z79.01 LONG TERM (CURRENT) USE OF ANTICOAGULANTS: ICD-10-CM

## 2021-08-06 LAB
INR PPP: 1.35 (ref 2–3)
PROTHROMBIN TIME: 14.2 SECONDS (ref 9.4–12)

## 2021-08-06 PROCEDURE — 85610 PROTHROMBIN TIME: CPT

## 2021-08-06 PROCEDURE — 36415 COLL VENOUS BLD VENIPUNCTURE: CPT

## 2021-08-09 ENCOUNTER — ANTICOAGULATION VISIT (OUTPATIENT)
Dept: CARDIOLOGY | Facility: CLINIC | Age: 51
End: 2021-08-09

## 2021-08-09 DIAGNOSIS — Z95.2 HX OF MITRAL VALVE REPLACEMENT WITH MECHANICAL VALVE: Primary | ICD-10-CM

## 2021-08-10 DIAGNOSIS — Z95.2 HX OF MITRAL VALVE REPLACEMENT WITH MECHANICAL VALVE: Primary | ICD-10-CM

## 2021-08-10 NOTE — PROGRESS NOTES
Advise to go to the closest ER or Urgent Care for shot of Lovenox today. Take 8mg today and tomorrow then return to 6/7. Recheck in 5 days. We will call in RX to wherever he needs us to.

## 2021-08-10 NOTE — TELEPHONE ENCOUNTER
[place] and has out of range INR and will not have enough med until he comes back    Rx Refill Note  Requested Prescriptions      No prescriptions requested or ordered in this encounter      Last office visit with prescribing clinician: 5/26/21 Komal  Next office visit with prescribing clinician: 11/30/21           Esther Dee RN  08/10/21, 17:48 EDT

## 2021-08-10 NOTE — PROGRESS NOTES
Patient is aware.    Advised him to find out a fax number to the hospital he goes to and we can fax and order for the INR in 5 days.    Gave him Glory's number in case the ER has any questions.

## 2021-08-10 NOTE — PROGRESS NOTES
INR 1.35  (unable to get hold of patient until today)    He is taking 6/6/7  He was supposed to be taking 6/7 (not sure why he is taking wrong dose)      Last INR was 2.08 on July 16  That was he was supposed to go to 6/7    Patient is currently in [place] until 8/22 won't be able to recheck until then either    He has 3mg and 4 mg tabs with him     (may need to send RX to [facility] since he may not have enough until he gets back in town)

## 2021-08-11 RX ORDER — WARFARIN SODIUM 3 MG/1
TABLET ORAL
Qty: 60 TABLET | Refills: 2 | Status: SHIPPED | OUTPATIENT
Start: 2021-08-11 | End: 2021-11-05

## 2021-08-11 RX ORDER — WARFARIN SODIUM 4 MG/1
TABLET ORAL
Qty: 30 TABLET | Refills: 2 | Status: SHIPPED | OUTPATIENT
Start: 2021-08-11 | End: 2021-11-05

## 2021-08-16 DIAGNOSIS — Z79.01 LONG TERM (CURRENT) USE OF ANTICOAGULANTS: ICD-10-CM

## 2021-08-16 DIAGNOSIS — Z79.01 LONG TERM (CURRENT) USE OF ANTICOAGULANTS: Primary | ICD-10-CM

## 2021-08-17 ENCOUNTER — ANTICOAGULATION VISIT (OUTPATIENT)
Dept: CARDIOLOGY | Facility: CLINIC | Age: 51
End: 2021-08-17

## 2021-08-17 DIAGNOSIS — Z95.2 HX OF MITRAL VALVE REPLACEMENT WITH MECHANICAL VALVE: Primary | ICD-10-CM

## 2021-08-17 LAB — INR PPP: 2.86

## 2021-08-18 NOTE — PROGRESS NOTES
Patient had INR checked while in [facility]  INR 2.86  Previous dose: 7/6  Adivsed to continue same dose and recheck in 1 week.  Patient aware and agrees.

## 2021-09-02 ENCOUNTER — TELEPHONE (OUTPATIENT)
Dept: CARDIOLOGY | Facility: CLINIC | Age: 51
End: 2021-09-02

## 2021-09-03 ENCOUNTER — ANTICOAGULATION VISIT (OUTPATIENT)
Dept: CARDIOLOGY | Facility: CLINIC | Age: 51
End: 2021-09-03

## 2021-09-03 ENCOUNTER — LAB (OUTPATIENT)
Dept: LAB | Facility: HOSPITAL | Age: 51
End: 2021-09-03

## 2021-09-03 DIAGNOSIS — Z95.2 HX OF MITRAL VALVE REPLACEMENT WITH MECHANICAL VALVE: Primary | ICD-10-CM

## 2021-09-03 LAB
INR PPP: 2.07 (ref 2–3)
PROTHROMBIN TIME: 21.1 SECONDS (ref 9.4–12)

## 2021-09-03 PROCEDURE — 85610 PROTHROMBIN TIME: CPT | Performed by: INTERNAL MEDICINE

## 2021-09-03 PROCEDURE — 36415 COLL VENOUS BLD VENIPUNCTURE: CPT

## 2021-09-10 ENCOUNTER — ANTICOAGULATION VISIT (OUTPATIENT)
Dept: CARDIOLOGY | Facility: CLINIC | Age: 51
End: 2021-09-10

## 2021-09-10 ENCOUNTER — LAB (OUTPATIENT)
Dept: LAB | Facility: HOSPITAL | Age: 51
End: 2021-09-10

## 2021-09-10 DIAGNOSIS — Z95.2 HX OF MITRAL VALVE REPLACEMENT WITH MECHANICAL VALVE: Primary | ICD-10-CM

## 2021-09-10 LAB
INR PPP: 2.11 (ref 2–3)
PROTHROMBIN TIME: 21.5 SECONDS (ref 9.4–12)

## 2021-09-10 PROCEDURE — 85610 PROTHROMBIN TIME: CPT | Performed by: INTERNAL MEDICINE

## 2021-09-10 PROCEDURE — 36415 COLL VENOUS BLD VENIPUNCTURE: CPT

## 2021-09-10 NOTE — PROGRESS NOTES
INR today is 2.11    Patient is taking 6/7/7    Last INR was 2.07 on Sept 3   Increased to 7/7/6 at that time

## 2021-09-17 ENCOUNTER — ANTICOAGULATION VISIT (OUTPATIENT)
Dept: CARDIOLOGY | Facility: CLINIC | Age: 51
End: 2021-09-17

## 2021-09-17 ENCOUNTER — LAB (OUTPATIENT)
Dept: LAB | Facility: HOSPITAL | Age: 51
End: 2021-09-17

## 2021-09-17 DIAGNOSIS — Z79.01 LONG TERM (CURRENT) USE OF ANTICOAGULANTS: ICD-10-CM

## 2021-09-17 DIAGNOSIS — Z95.2 HX OF MITRAL VALVE REPLACEMENT WITH MECHANICAL VALVE: Primary | ICD-10-CM

## 2021-09-17 LAB
INR PPP: 2.36 (ref 2–3)
PROTHROMBIN TIME: 23.8 SECONDS (ref 9.4–12)

## 2021-09-17 PROCEDURE — 36415 COLL VENOUS BLD VENIPUNCTURE: CPT

## 2021-09-17 PROCEDURE — 85610 PROTHROMBIN TIME: CPT

## 2021-09-17 NOTE — PROGRESS NOTES
0800:Bedside and Verbal shift change report given to NI Espinoza (oncoming nurse) by Zahra England (offgoing nurse). Report included the following information SBAR. 1030: Offered translation service to pt for reviewing discharge paperwork. Pt declined, stating \"English is fine. \"    1400: I have reviewed discharge instructions with the patient. The patient verbalized understanding. All questions answered. Pt being discharged home with . Taken to main entrance for discharge by RN in wheelchair. Relayed msg to pt.

## 2021-09-30 ENCOUNTER — LAB (OUTPATIENT)
Dept: LAB | Facility: HOSPITAL | Age: 51
End: 2021-09-30

## 2021-09-30 DIAGNOSIS — Z79.01 LONG TERM (CURRENT) USE OF ANTICOAGULANTS: ICD-10-CM

## 2021-09-30 LAB
INR PPP: 2.64 (ref 2–3)
PROTHROMBIN TIME: 26.7 SECONDS (ref 9.4–12)

## 2021-09-30 PROCEDURE — 36415 COLL VENOUS BLD VENIPUNCTURE: CPT

## 2021-09-30 PROCEDURE — 85610 PROTHROMBIN TIME: CPT

## 2021-10-01 ENCOUNTER — ANTICOAGULATION VISIT (OUTPATIENT)
Dept: CARDIOLOGY | Facility: CLINIC | Age: 51
End: 2021-10-01

## 2021-10-01 DIAGNOSIS — Z95.2 HX OF MITRAL VALVE REPLACEMENT WITH MECHANICAL VALVE: Primary | ICD-10-CM

## 2021-10-01 NOTE — PROGRESS NOTES
INR is 2.64    Patient is taking 8mg qd    Last INR was 2.36 on Sept 17th  Increased to 8mg qd at that time

## 2021-11-05 ENCOUNTER — OFFICE VISIT (OUTPATIENT)
Dept: FAMILY MEDICINE CLINIC | Facility: CLINIC | Age: 51
End: 2021-11-05

## 2021-11-05 ENCOUNTER — LAB (OUTPATIENT)
Dept: LAB | Facility: HOSPITAL | Age: 51
End: 2021-11-05

## 2021-11-05 VITALS
RESPIRATION RATE: 17 BRPM | BODY MASS INDEX: 30.35 KG/M2 | OXYGEN SATURATION: 97 % | HEART RATE: 104 BPM | SYSTOLIC BLOOD PRESSURE: 132 MMHG | DIASTOLIC BLOOD PRESSURE: 88 MMHG | TEMPERATURE: 97.6 F | HEIGHT: 70 IN | WEIGHT: 212 LBS

## 2021-11-05 DIAGNOSIS — E78.5 HYPERLIPIDEMIA, UNSPECIFIED HYPERLIPIDEMIA TYPE: Primary | ICD-10-CM

## 2021-11-05 DIAGNOSIS — G47.00 INSOMNIA, UNSPECIFIED TYPE: ICD-10-CM

## 2021-11-05 DIAGNOSIS — E78.5 HYPERLIPIDEMIA, UNSPECIFIED HYPERLIPIDEMIA TYPE: ICD-10-CM

## 2021-11-05 DIAGNOSIS — I10 PRIMARY HYPERTENSION: ICD-10-CM

## 2021-11-05 DIAGNOSIS — E11.9 TYPE 2 DIABETES MELLITUS WITHOUT COMPLICATION, WITHOUT LONG-TERM CURRENT USE OF INSULIN (HCC): ICD-10-CM

## 2021-11-05 DIAGNOSIS — Z79.01 LONG TERM (CURRENT) USE OF ANTICOAGULANTS: ICD-10-CM

## 2021-11-05 DIAGNOSIS — N52.9 ERECTILE DYSFUNCTION, UNSPECIFIED ERECTILE DYSFUNCTION TYPE: ICD-10-CM

## 2021-11-05 PROBLEM — R04.0 EPISTAXIS: Status: ACTIVE | Noted: 2021-11-05

## 2021-11-05 PROBLEM — I50.9 CHF (CONGESTIVE HEART FAILURE): Status: ACTIVE | Noted: 2021-11-05

## 2021-11-05 PROBLEM — D64.9 ANEMIA: Status: ACTIVE | Noted: 2021-11-05

## 2021-11-05 PROBLEM — R10.32 LLQ ABDOMINAL PAIN: Status: ACTIVE | Noted: 2021-11-05

## 2021-11-05 PROBLEM — G47.09 OTHER INSOMNIA: Status: ACTIVE | Noted: 2021-11-05

## 2021-11-05 PROBLEM — K22.70 BARRETTS ESOPHAGUS: Status: ACTIVE | Noted: 2021-11-05

## 2021-11-05 PROBLEM — G47.30 SLEEP APNEA: Status: ACTIVE | Noted: 2021-11-05

## 2021-11-05 PROBLEM — F32.A DEPRESSION: Status: ACTIVE | Noted: 2021-11-05

## 2021-11-05 PROBLEM — R12 HEARTBURN: Status: ACTIVE | Noted: 2021-11-05

## 2021-11-05 PROBLEM — M47.817 LUMBOSACRAL SPONDYLOSIS WITHOUT MYELOPATHY: Status: ACTIVE | Noted: 2021-11-05

## 2021-11-05 PROBLEM — R74.8 ABNORMAL LIVER ENZYMES: Status: ACTIVE | Noted: 2021-11-05

## 2021-11-05 PROBLEM — R14.0 ABDOMINAL BLOATING: Status: ACTIVE | Noted: 2021-11-05

## 2021-11-05 PROBLEM — M51.369 DEGENERATION OF LUMBAR INTERVERTEBRAL DISC: Status: ACTIVE | Noted: 2021-11-05

## 2021-11-05 PROBLEM — M51.36 DEGENERATION OF LUMBAR INTERVERTEBRAL DISC: Status: ACTIVE | Noted: 2021-11-05

## 2021-11-05 LAB
ALBUMIN SERPL-MCNC: 4.3 G/DL (ref 3.5–5.2)
ALBUMIN/GLOB SERPL: 1.5 G/DL
ALP SERPL-CCNC: 44 U/L (ref 39–117)
ALT SERPL W P-5'-P-CCNC: 15 U/L (ref 1–41)
ANION GAP SERPL CALCULATED.3IONS-SCNC: 6.7 MMOL/L (ref 5–15)
AST SERPL-CCNC: 29 U/L (ref 1–40)
BILIRUB SERPL-MCNC: 1 MG/DL (ref 0–1.2)
BUN SERPL-MCNC: 8 MG/DL (ref 6–20)
BUN/CREAT SERPL: 5.8 (ref 7–25)
CALCIUM SPEC-SCNC: 9.5 MG/DL (ref 8.6–10.5)
CHLORIDE SERPL-SCNC: 105 MMOL/L (ref 98–107)
CHOLEST SERPL-MCNC: 127 MG/DL (ref 0–200)
CO2 SERPL-SCNC: 27.3 MMOL/L (ref 22–29)
CREAT SERPL-MCNC: 1.39 MG/DL (ref 0.76–1.27)
DEPRECATED RDW RBC AUTO: 35.7 FL (ref 37–54)
ERYTHROCYTE [DISTWIDTH] IN BLOOD BY AUTOMATED COUNT: 13.6 % (ref 12.3–15.4)
GFR SERPL CREATININE-BSD FRML MDRD: 65 ML/MIN/1.73
GLOBULIN UR ELPH-MCNC: 2.9 GM/DL
GLUCOSE SERPL-MCNC: 72 MG/DL (ref 65–99)
HBA1C MFR BLD: 5.76 % (ref 4.8–5.6)
HCT VFR BLD AUTO: 42 % (ref 37.5–51)
HDLC SERPL-MCNC: 44 MG/DL (ref 40–60)
HGB BLD-MCNC: 13.2 G/DL (ref 13–17.7)
INR PPP: 3.89 (ref 2–3)
LDLC SERPL CALC-MCNC: 72 MG/DL (ref 0–100)
LDLC/HDLC SERPL: 1.66 {RATIO}
MCH RBC QN AUTO: 23.2 PG (ref 26.6–33)
MCHC RBC AUTO-ENTMCNC: 31.4 G/DL (ref 31.5–35.7)
MCV RBC AUTO: 73.9 FL (ref 79–97)
PLATELET # BLD AUTO: 265 10*3/MM3 (ref 140–450)
PMV BLD AUTO: 10.8 FL (ref 6–12)
POTASSIUM SERPL-SCNC: 4.1 MMOL/L (ref 3.5–5.2)
PROT SERPL-MCNC: 7.2 G/DL (ref 6–8.5)
PROTHROMBIN TIME: 37.6 SECONDS (ref 9.4–12)
RBC # BLD AUTO: 5.68 10*6/MM3 (ref 4.14–5.8)
SODIUM SERPL-SCNC: 139 MMOL/L (ref 136–145)
TRIGL SERPL-MCNC: 50 MG/DL (ref 0–150)
VLDLC SERPL-MCNC: 11 MG/DL (ref 5–40)
WBC # BLD AUTO: 5.04 10*3/MM3 (ref 3.4–10.8)

## 2021-11-05 PROCEDURE — 36415 COLL VENOUS BLD VENIPUNCTURE: CPT

## 2021-11-05 PROCEDURE — 99214 OFFICE O/P EST MOD 30 MIN: CPT | Performed by: NURSE PRACTITIONER

## 2021-11-05 PROCEDURE — 80053 COMPREHEN METABOLIC PANEL: CPT

## 2021-11-05 PROCEDURE — 85027 COMPLETE CBC AUTOMATED: CPT

## 2021-11-05 PROCEDURE — 83036 HEMOGLOBIN GLYCOSYLATED A1C: CPT

## 2021-11-05 PROCEDURE — 80061 LIPID PANEL: CPT

## 2021-11-05 PROCEDURE — 85610 PROTHROMBIN TIME: CPT

## 2021-11-05 RX ORDER — LANOLIN ALCOHOL/MO/W.PET/CERES
CREAM (GRAM) TOPICAL
COMMUNITY

## 2021-11-05 RX ORDER — WARFARIN SODIUM 5 MG/1
TABLET ORAL
COMMUNITY
Start: 2021-04-23 | End: 2022-02-23 | Stop reason: SDUPTHER

## 2021-11-05 RX ORDER — SILDENAFIL 100 MG/1
TABLET, FILM COATED ORAL
COMMUNITY
End: 2021-11-05 | Stop reason: SDUPTHER

## 2021-11-05 RX ORDER — WARFARIN SODIUM 4 MG/1
TABLET ORAL
COMMUNITY
Start: 2021-04-23 | End: 2021-11-30 | Stop reason: SDUPTHER

## 2021-11-05 RX ORDER — METHOCARBAMOL 500 MG/1
TABLET, FILM COATED ORAL
COMMUNITY
End: 2021-11-30 | Stop reason: ALTCHOICE

## 2021-11-05 RX ORDER — ATORVASTATIN CALCIUM 10 MG/1
TABLET, FILM COATED ORAL NIGHTLY
COMMUNITY

## 2021-11-05 RX ORDER — DULAGLUTIDE 1.5 MG/.5ML
INJECTION, SOLUTION SUBCUTANEOUS
COMMUNITY
Start: 2021-10-28 | End: 2022-11-14 | Stop reason: ALTCHOICE

## 2021-11-05 RX ORDER — FLUTICASONE PROPIONATE 50 MCG
2 SPRAY, SUSPENSION (ML) NASAL AS NEEDED
COMMUNITY
End: 2022-11-14

## 2021-11-05 RX ORDER — BUMETANIDE 2 MG/1
TABLET ORAL
COMMUNITY
End: 2021-11-30 | Stop reason: ALTCHOICE

## 2021-11-05 RX ORDER — BUPROPION HYDROCHLORIDE 150 MG/1
TABLET ORAL
COMMUNITY
End: 2021-11-30 | Stop reason: SDUPTHER

## 2021-11-05 RX ORDER — LISINOPRIL AND HYDROCHLOROTHIAZIDE 12.5; 1 MG/1; MG/1
TABLET ORAL DAILY
COMMUNITY
End: 2022-06-01

## 2021-11-05 RX ORDER — SILDENAFIL 100 MG/1
100 TABLET, FILM COATED ORAL AS NEEDED
Qty: 30 TABLET | Refills: 1 | Status: SHIPPED | OUTPATIENT
Start: 2021-11-05 | End: 2022-01-12

## 2021-11-05 RX ORDER — ASPIRIN 81 MG/1
TABLET ORAL DAILY
COMMUNITY

## 2021-11-05 RX ORDER — ZOLPIDEM TARTRATE 5 MG/1
TABLET ORAL
COMMUNITY
End: 2021-11-05 | Stop reason: SDUPTHER

## 2021-11-05 RX ORDER — METFORMIN HYDROCHLORIDE 500 MG/1
1 TABLET, EXTENDED RELEASE ORAL 2 TIMES DAILY
COMMUNITY
End: 2021-11-05

## 2021-11-05 RX ORDER — PANTOPRAZOLE SODIUM 20 MG/1
TABLET, DELAYED RELEASE ORAL
COMMUNITY

## 2021-11-05 RX ORDER — CARVEDILOL 25 MG/1
25 TABLET ORAL 2 TIMES DAILY WITH MEALS
COMMUNITY

## 2021-11-05 RX ORDER — ZOLPIDEM TARTRATE 5 MG/1
5 TABLET ORAL NIGHTLY PRN
Qty: 90 TABLET | Refills: 0 | Status: SHIPPED | OUTPATIENT
Start: 2021-11-05 | End: 2021-12-02

## 2021-11-05 RX ORDER — MONTELUKAST SODIUM 10 MG/1
TABLET ORAL
COMMUNITY

## 2021-11-05 RX ORDER — LUBIPROSTONE 24 UG/1
CAPSULE ORAL
COMMUNITY
End: 2022-11-14

## 2021-11-05 NOTE — PROGRESS NOTES
"Chief Complaint  Diabetes (6 month follow up), Hypertension, and Hyperlipidemia    Subjective          Kevin Clifton presents to [facility]  Presents to the office today for 6-month follow-up.  Patient states that he has been doing well.  Blood pressure on arrival today is 132/88.  He denies any chest pain shortness breath or palpitations at this time.  Patient states that he does not normally check his blood sugar unless he can tell that it is low.  Did explain to the patient that he was past due on having lab work completed.  He states that he will do that today.  He is requesting refills on his insomnia medication as well as his Viagra.  Patient does state that he has been battling depression.  He denies any suicidal homicidal ideations at this time.  [facility]      Objective   Vital Signs:   /88 (BP Location: Right arm, Patient Position: Sitting, Cuff Size: Adult)   Pulse 104   Temp 97.6 °F (36.4 °C) (Infrared)   Resp 17   Ht 177.8 cm (70\")   Wt 96.2 kg (212 lb)   SpO2 97%   BMI 30.42 kg/m²     Physical Exam  Vitals reviewed.   Constitutional:       Appearance: Normal appearance.   Cardiovascular:      Rate and Rhythm: Normal rate and regular rhythm.      Heart sounds: S1 normal and S2 normal.      Comments: Artificial heart valve present  Pulmonary:      Effort: Pulmonary effort is normal. No respiratory distress.      Breath sounds: Normal breath sounds.   Skin:     General: Skin is warm and dry.   Neurological:      Mental Status: He is alert and oriented to person, place, and time.   Psychiatric:         Attention and Perception: Attention normal.         Mood and Affect: Mood normal.         Behavior: Behavior normal.        Result Review :                 Assessment and Plan    Diagnoses and all orders for this visit:    1. Hyperlipidemia, unspecified hyperlipidemia type (Primary)  -     Lipid Panel; " Future    2. Primary hypertension  -     CBC (No Diff); Future  -     Comprehensive Metabolic Panel; Future    3. Type 2 diabetes mellitus without complication, without long-term current use of insulin (HCC)  -     Hemoglobin A1c; Future    4. Erectile dysfunction, unspecified erectile dysfunction type  -     sildenafil (Viagra) 100 MG tablet; Take 1 tablet by mouth As Needed for Erectile Dysfunction.  Dispense: 30 tablet; Refill: 1    5. Insomnia, unspecified type  -     zolpidem (Ambien) 5 MG tablet; Take 1 tablet by mouth At Night As Needed for Sleep.  Dispense: 90 tablet; Refill: 0        Follow Up   Return in about 6 months (around 5/5/2022) for Recheck.  Patient was given instructions and counseling regarding his condition or for health maintenance advice. Please see specific information pulled into the AVS if appropriate.

## 2021-11-08 ENCOUNTER — ANTICOAGULATION VISIT (OUTPATIENT)
Dept: CARDIOLOGY | Facility: CLINIC | Age: 51
End: 2021-11-08

## 2021-11-08 DIAGNOSIS — Z95.2 HX OF MITRAL VALVE REPLACEMENT WITH MECHANICAL VALVE: Primary | ICD-10-CM

## 2021-11-08 NOTE — PROGRESS NOTES
INR is 3.89    Patient is taking 8mg qd    He was on antibiotics for dental issues and took prophalaxyis antibiotic for dental appt on 11/4    Finished with the antibiotics now.    Last INR was 2.64 on Sept 30  No changes made

## 2021-11-28 PROBLEM — I42.1 HOCM (HYPERTROPHIC OBSTRUCTIVE CARDIOMYOPATHY) (HCC): Status: ACTIVE | Noted: 2021-11-28

## 2021-11-29 ENCOUNTER — LAB (OUTPATIENT)
Dept: LAB | Facility: HOSPITAL | Age: 51
End: 2021-11-29

## 2021-11-29 DIAGNOSIS — Z79.01 LONG TERM (CURRENT) USE OF ANTICOAGULANTS: ICD-10-CM

## 2021-11-29 LAB
INR PPP: 4.36 (ref 2–3)
PROTHROMBIN TIME: 42.3 SECONDS (ref 9.4–12)

## 2021-11-29 PROCEDURE — 36415 COLL VENOUS BLD VENIPUNCTURE: CPT

## 2021-11-29 PROCEDURE — 85610 PROTHROMBIN TIME: CPT

## 2021-11-30 ENCOUNTER — OFFICE VISIT (OUTPATIENT)
Dept: CARDIOLOGY | Facility: CLINIC | Age: 51
End: 2021-11-30

## 2021-11-30 ENCOUNTER — ANTICOAGULATION VISIT (OUTPATIENT)
Dept: CARDIOLOGY | Facility: CLINIC | Age: 51
End: 2021-11-30

## 2021-11-30 VITALS
BODY MASS INDEX: 29.63 KG/M2 | DIASTOLIC BLOOD PRESSURE: 91 MMHG | SYSTOLIC BLOOD PRESSURE: 143 MMHG | WEIGHT: 207 LBS | HEIGHT: 70 IN | HEART RATE: 85 BPM

## 2021-11-30 DIAGNOSIS — Z95.2 HX OF MITRAL VALVE REPLACEMENT WITH MECHANICAL VALVE: Primary | ICD-10-CM

## 2021-11-30 DIAGNOSIS — E78.5 HYPERLIPIDEMIA, UNSPECIFIED HYPERLIPIDEMIA TYPE: ICD-10-CM

## 2021-11-30 DIAGNOSIS — Z95.2 HX OF MITRAL VALVE REPLACEMENT WITH MECHANICAL VALVE: ICD-10-CM

## 2021-11-30 DIAGNOSIS — I42.1 HOCM (HYPERTROPHIC OBSTRUCTIVE CARDIOMYOPATHY) (HCC): Primary | ICD-10-CM

## 2021-11-30 DIAGNOSIS — I10 PRIMARY HYPERTENSION: ICD-10-CM

## 2021-11-30 PROCEDURE — 99214 OFFICE O/P EST MOD 30 MIN: CPT | Performed by: INTERNAL MEDICINE

## 2021-11-30 RX ORDER — WARFARIN SODIUM 3 MG/1
TABLET ORAL
Qty: 90 TABLET | Refills: 3 | Status: SHIPPED | OUTPATIENT
Start: 2021-11-30 | End: 2022-10-20 | Stop reason: SDUPTHER

## 2021-11-30 RX ORDER — FERROUS SULFATE TAB EC 324 MG (65 MG FE EQUIVALENT) 324 (65 FE) MG
324 TABLET DELAYED RESPONSE ORAL 2 TIMES DAILY WITH MEALS
COMMUNITY
End: 2022-11-14

## 2021-11-30 RX ORDER — CETIRIZINE HYDROCHLORIDE 10 MG/1
10 TABLET ORAL DAILY
COMMUNITY

## 2021-11-30 RX ORDER — WARFARIN SODIUM 4 MG/1
TABLET ORAL
Qty: 180 TABLET | Refills: 3 | Status: SHIPPED | OUTPATIENT
Start: 2021-11-30 | End: 2022-10-20 | Stop reason: SDUPTHER

## 2021-11-30 RX ORDER — BUPROPION HYDROCHLORIDE 150 MG/1
300 TABLET, EXTENDED RELEASE ORAL 2 TIMES DAILY
COMMUNITY

## 2021-11-30 NOTE — PROGRESS NOTES
Dr Kern addressed this at OV today.  He instructed patient to take 7mg qd and check in a week.  Please disregard instructions from Glory

## 2021-11-30 NOTE — ASSESSMENT & PLAN NOTE
Mild in office elevation controlled at home continue with Coreg 25 twice daily and lisinopril 10/12.5 mg twice daily  Counseled patient on  • low-sodium diet of less than 2 g  • Aerobic activity 30 minutes a day 5 times a week  • Weight loss    .

## 2021-11-30 NOTE — ASSESSMENT & PLAN NOTE
Stable symptoms and control blood pressure continue with his current antihypertensive regimen Coreg 25 mg twice daily and lisinopril hydrochlorothiazide 10/12.5

## 2021-11-30 NOTE — PROGRESS NOTES
INR is 4.36    Patient is taking 8mg qd    No diet, antibiotics, or med changes      Last INR was 3.89 On Npv 5  Took 4mg for one day then back to 8mg qd    OV today with Dr Kern

## 2021-11-30 NOTE — PROGRESS NOTES
Chief Complaint  Atrial Fibrillation and MVR    Subjective    Patient has been doing well denies any problems with chest pain or worsening shortness of breath.  Occasionally has dizziness with standing    Past Medical History:   Diagnosis Date   • Abdominal bloating    • Abnormal ECG    • Anemia    • Arrhythmia    • Asthma    • Atrial fibrillation (HCC)    • Carr's esophagus    • Benign essential hypertension    • CHF (congestive heart failure) (HCC)    • DDD (degenerative disc disease), cervical    • Depression    • Diabetes (HCC)    • Diabetes mellitus type 2, noninsulin dependent (HCC)    • Elevated liver enzymes    • GERD (gastroesophageal reflux disease)    • Heart murmur    • Heart valve disease    • Heartburn    • High cholesterol    • HLD (hyperlipidemia)    • HTN (hypertension)    • LLQ abdominal pain    • Nosebleed    • Sleep apnea          Current Outpatient Medications:   •  aspirin (aspirin) 81 MG EC tablet, Daily., Disp: , Rfl:   •  atorvastatin (LIPITOR) 10 MG tablet, Every Night., Disp: , Rfl:   •  buPROPion SR (WELLBUTRIN SR) 200 MG 12 hr tablet, Take 200 mg by mouth 2 (Two) Times a Day., Disp: , Rfl:   •  carvedilol (COREG) 25 MG tablet, carvedilol 25 mg oral tablet take 2 tablets by oral route 2 times a day   Suspended, Disp: , Rfl:   •  cetirizine (zyrTEC) 10 MG tablet, Take 10 mg by mouth Daily., Disp: , Rfl:   •  Cholecalciferol 25 MCG (1000 UT) capsule, Vitamin D3 1,000 unit oral capsule take 1 capsule by oral route daily   Active, Disp: , Rfl:   •  ferrous sulfate 324 (65 Fe) MG tablet delayed-release EC tablet, Take 324 mg by mouth 2 (Two) Times a Day With Meals., Disp: , Rfl:   •  fluticasone (FLONASE) 50 MCG/ACT nasal spray, 2 sprays by Each Nare route As Needed., Disp: , Rfl:   •  lisinopril-hydrochlorothiazide (PRINZIDE,ZESTORETIC) 10-12.5 MG per tablet, Take  by mouth Daily., Disp: , Rfl:   •  lubiprostone (Amitiza) 24 MCG capsule, Amitiza 24 mcg oral capsule take 1 capsule (24 mcg)  by oral route 2 times per day with food and water   Suspended, Disp: , Rfl:   •  melatonin 3 MG tablet, melatonin 3 mg oral tablet take 1 tablet by oral route once a day (at bedtime)   Suspended, Disp: , Rfl:   •  metFORMIN (GLUCOPHAGE) 500 MG tablet, Take 500 mg by mouth 2 (Two) Times a Day With Meals., Disp: , Rfl:   •  montelukast (Singulair) 10 MG tablet, Singulair 10 mg oral tablet take 1 tablet (10 mg) by oral route once daily in the evening   Suspended, Disp: , Rfl:   •  pantoprazole (PROTONIX) 20 MG EC tablet, pantoprazole 20 mg tablet,delayed release, Disp: , Rfl:   •  sildenafil (Viagra) 100 MG tablet, Take 1 tablet by mouth As Needed for Erectile Dysfunction., Disp: 30 tablet, Rfl: 1  •  Trulicity 1.5 MG/0.5ML solution pen-injector, Every 7 (Seven) Days., Disp: , Rfl:   •  warfarin (COUMADIN) 4 MG tablet, Take 2 tabs po qd or as directed, Disp: 180 tablet, Rfl: 3  •  warfarin (COUMADIN) 5 MG tablet, warfarin 5 mg oral tablet take 1 tablet (5 mg) by oral route once daily or as directed 4/23/2021  Active, Disp: , Rfl:   •  zolpidem (Ambien) 5 MG tablet, Take 1 tablet by mouth At Night As Needed for Sleep., Disp: 90 tablet, Rfl: 0  •  warfarin (COUMADIN) 3 MG tablet, Take on po qd or as directed, Disp: 90 tablet, Rfl: 3    Medications Discontinued During This Encounter   Medication Reason   • buPROPion XL (WELLBUTRIN XL) 150 MG 24 hr tablet Duplicate order   • bumetanide (BUMEX) 2 MG tablet Discontinued by another clinician   • methocarbamol (ROBAXIN) 500 MG tablet Discontinued by another clinician   • warfarin (COUMADIN) 4 MG tablet Reorder     No Known Allergies     Social History     Tobacco Use   • Smoking status: Never Smoker   • Smokeless tobacco: Never Used   Vaping Use   • Vaping Use: Never used   Substance Use Topics   • Alcohol use: Yes     Comment: rarely   • Drug use: Never       Family History   Problem Relation Age of Onset   • Diabetes Mother    • No Known Problems Father         Objective  "    /91   Pulse 85   Ht 177.8 cm (70\")   Wt 93.9 kg (207 lb)   BMI 29.70 kg/m²       Physical Exam    General Appearance:   · no acute distress  · Alert and oriented x3  HENT:   · lips not cyanotic  · Atraumatic  Neck:  · No jvd   · supple  Respiratory:  · no respiratory distress  · normal breath sounds  · no rales  Cardiovascular:  · Regular rate and rhythm  · no S3, no S4   · 1/6 murmur normal mechanical heart sounds  · no rub  Extremities  · No cyanosis  · lower extremity edema: none    Skin:   · warm, dry  · No rashes      Result Review :     No results found for: PROBNP  CMP    CMP 1/25/21 2/15/21 11/5/21   Glucose   72   BUN   8   Creatinine   1.39 (A)   eGFR African Am   65   Sodium   139   Potassium   4.1   Chloride   105   Calcium   9.5   Albumin 4.5 3.8 4.30   Total Bilirubin 0.91  1.0   Alkaline Phosphatase 45 (A)  44   AST (SGOT) 46  29   ALT (SGPT) 45 (A)  15   (A) Abnormal value            CBC w/diff    CBC w/Diff 12/14/20 11/5/21   WBC 3.87 (A) 5.04   RBC 5.65 5.68   Hemoglobin 12.6 (A) 13.2   Hematocrit 42.5 42.0   MCV 75.2 (A) 73.9 (A)   MCH 22.3 (A) 23.2 (A)   MCHC 29.6 (A) 31.4 (A)   RDW 14.2 13.6   Platelets 375 265   Neutrophil Rel % 31.5    Lymphocyte Rel % 47.5 (A)    Monocyte Rel % 16.8 (A)    Eosinophil Rel % 3.4    Basophil Rel % 0.5    (A) Abnormal value             Lab Results   Component Value Date    TSH 1.070 02/11/2019      Lab Results   Component Value Date    FREET4 1.6 02/11/2019      No results found for: DDIMERQUANT  No results found for: MG   No results found for: DIGOXIN   Lab Results   Component Value Date    TROPONINT <0.01 02/07/2019           Lipid Panel    Lipid Panel 11/5/21   Total Cholesterol 127   Triglycerides 50   HDL Cholesterol 44   VLDL Cholesterol 11   LDL Cholesterol  72   LDL/HDL Ratio 1.66           No results found for: POCTROP                   Diagnoses and all orders for this visit:    1. HOCM (hypertrophic obstructive cardiomyopathy) (HCC) " (Primary)  Assessment & Plan:  Stable symptoms and control blood pressure continue with his current antihypertensive regimen Coreg 25 mg twice daily and lisinopril hydrochlorothiazide 10/12.5       2. Hx of mitral valve replacement with mechanical valve  Assessment & Plan:  Valve working normally decreasing INR to 7 mg daily repeat PT INR in 1 week      3. Primary hypertension  Assessment & Plan:  Mild in office elevation controlled at home continue with Coreg 25 twice daily and lisinopril 10/12.5 mg twice daily  Counseled patient on  • low-sodium diet of less than 2 g  • Aerobic activity 30 minutes a day 5 times a week  • Weight loss    .      4. Hyperlipidemia, unspecified hyperlipidemia type  Assessment & Plan:  Continue with Lipitor 10 nightly LDL near goal of less than 70 repeat lipids on next visit      Other orders  -     warfarin (COUMADIN) 4 MG tablet; Take 2 tabs po qd or as directed  Dispense: 180 tablet; Refill: 3  -     warfarin (COUMADIN) 3 MG tablet; Take on po qd or as directed  Dispense: 90 tablet; Refill: 3          Follow Up     Return in about 6 months (around 5/30/2022) for EKG with F/U.          Patient was given instructions and counseling regarding his condition or for health maintenance advice. Please see specific information pulled into the AVS if appropriate.

## 2021-12-01 DIAGNOSIS — G47.00 INSOMNIA, UNSPECIFIED TYPE: ICD-10-CM

## 2021-12-02 RX ORDER — ZOLPIDEM TARTRATE 5 MG/1
TABLET ORAL
Qty: 90 TABLET | Refills: 0 | Status: SHIPPED | OUTPATIENT
Start: 2021-12-02 | End: 2022-01-12

## 2021-12-02 NOTE — TELEPHONE ENCOUNTER
Last visit:11/5/2021  Next visit:5/16/2022  UDS:10/7/2019      Sent patient mychart message - needing update UDS and consent     Last refilled on 11/5/2021 for 90tabs

## 2021-12-13 ENCOUNTER — LAB (OUTPATIENT)
Dept: LAB | Facility: HOSPITAL | Age: 51
End: 2021-12-13

## 2021-12-13 DIAGNOSIS — Z79.01 LONG TERM (CURRENT) USE OF ANTICOAGULANTS: ICD-10-CM

## 2021-12-13 LAB
INR PPP: 2.41 (ref 2–3)
PROTHROMBIN TIME: 23.4 SECONDS (ref 9.4–12)

## 2021-12-13 PROCEDURE — 85610 PROTHROMBIN TIME: CPT

## 2021-12-13 PROCEDURE — 36415 COLL VENOUS BLD VENIPUNCTURE: CPT

## 2021-12-14 ENCOUNTER — ANTICOAGULATION VISIT (OUTPATIENT)
Dept: CARDIOLOGY | Facility: CLINIC | Age: 51
End: 2021-12-14

## 2021-12-14 DIAGNOSIS — Z95.2 HX OF MITRAL VALVE REPLACEMENT WITH MECHANICAL VALVE: Primary | ICD-10-CM

## 2021-12-14 NOTE — PROGRESS NOTES
INR is 2.41    Patient is taking 7mg qd    Last INR was 4.36 on Nov 29 decreased to 7mg at that time

## 2021-12-28 ENCOUNTER — CLINICAL SUPPORT (OUTPATIENT)
Dept: FAMILY MEDICINE CLINIC | Facility: CLINIC | Age: 51
End: 2021-12-28

## 2021-12-28 DIAGNOSIS — Z20.822 EXPOSURE TO COVID-19 VIRUS: Primary | ICD-10-CM

## 2021-12-28 LAB — SARS-COV-2 RNA PNL SPEC NAA+PROBE: NOT DETECTED

## 2021-12-28 PROCEDURE — U0004 COV-19 TEST NON-CDC HGH THRU: HCPCS | Performed by: NURSE PRACTITIONER

## 2022-01-12 DIAGNOSIS — N52.9 ERECTILE DYSFUNCTION, UNSPECIFIED ERECTILE DYSFUNCTION TYPE: ICD-10-CM

## 2022-01-12 DIAGNOSIS — G47.00 INSOMNIA, UNSPECIFIED TYPE: ICD-10-CM

## 2022-01-12 RX ORDER — SILDENAFIL 100 MG/1
TABLET, FILM COATED ORAL
Qty: 30 TABLET | Refills: 1 | Status: SHIPPED | OUTPATIENT
Start: 2022-01-12 | End: 2022-03-15 | Stop reason: SDUPTHER

## 2022-01-12 RX ORDER — ZOLPIDEM TARTRATE 5 MG/1
TABLET ORAL
Qty: 90 TABLET | Refills: 1 | Status: SHIPPED | OUTPATIENT
Start: 2022-01-12

## 2022-01-18 ENCOUNTER — LAB (OUTPATIENT)
Dept: LAB | Facility: HOSPITAL | Age: 52
End: 2022-01-18

## 2022-01-18 DIAGNOSIS — Z79.01 LONG TERM (CURRENT) USE OF ANTICOAGULANTS: ICD-10-CM

## 2022-01-18 LAB
INR PPP: 2.58 (ref 2–3)
PROTHROMBIN TIME: 25 SECONDS (ref 9.4–12)

## 2022-01-18 PROCEDURE — 36415 COLL VENOUS BLD VENIPUNCTURE: CPT

## 2022-01-18 PROCEDURE — 85610 PROTHROMBIN TIME: CPT

## 2022-01-19 ENCOUNTER — ANTICOAGULATION VISIT (OUTPATIENT)
Dept: CARDIOLOGY | Facility: CLINIC | Age: 52
End: 2022-01-19

## 2022-01-19 DIAGNOSIS — Z95.2 HX OF MITRAL VALVE REPLACEMENT WITH MECHANICAL VALVE: Primary | ICD-10-CM

## 2022-01-19 NOTE — PROGRESS NOTES
Pt INR 2.58 done  1/19/2022    Pt taking 8mg alt 7mg    Last INR  2.41      Dx Mitral of valve replacement

## 2022-01-19 NOTE — PROGRESS NOTES
Left voice msg for patient letting him know to continue taking same dose and to call back to make sure he got the message, he should recheck in 4 weeks.

## 2022-01-20 ENCOUNTER — TELEPHONE (OUTPATIENT)
Dept: CARDIOLOGY | Facility: CLINIC | Age: 52
End: 2022-01-20

## 2022-01-20 NOTE — TELEPHONE ENCOUNTER
----- Message from GABY Perkins sent at 1/19/2022  8:42 AM EST -----      ----- Message -----  From: Victor Manuel Baker  Sent: 1/19/2022   8:36 AM EST  To: GABY Perkins

## 2022-01-20 NOTE — TELEPHONE ENCOUNTER
Spoke with pt and informed her to continue same dose and recheck in 4 weeks.  Pt verbalized understanding.

## 2022-02-15 ENCOUNTER — LAB (OUTPATIENT)
Dept: LAB | Facility: HOSPITAL | Age: 52
End: 2022-02-15

## 2022-02-15 DIAGNOSIS — Z79.01 LONG TERM (CURRENT) USE OF ANTICOAGULANTS: ICD-10-CM

## 2022-02-15 LAB
INR PPP: 2.53 (ref 2–3)
PROTHROMBIN TIME: 24.5 SECONDS (ref 9.4–12)

## 2022-02-15 PROCEDURE — 36415 COLL VENOUS BLD VENIPUNCTURE: CPT

## 2022-02-15 PROCEDURE — 85610 PROTHROMBIN TIME: CPT

## 2022-02-16 ENCOUNTER — ANTICOAGULATION VISIT (OUTPATIENT)
Dept: CARDIOLOGY | Facility: CLINIC | Age: 52
End: 2022-02-16

## 2022-02-16 DIAGNOSIS — Z95.2 HX OF MITRAL VALVE REPLACEMENT WITH MECHANICAL VALVE: Primary | ICD-10-CM

## 2022-02-23 ENCOUNTER — TELEPHONE (OUTPATIENT)
Dept: CARDIOLOGY | Facility: CLINIC | Age: 52
End: 2022-02-23

## 2022-02-23 RX ORDER — WARFARIN SODIUM 5 MG/1
TABLET ORAL
Qty: 90 TABLET | Refills: 3 | Status: SHIPPED | OUTPATIENT
Start: 2022-02-23 | End: 2022-10-20 | Stop reason: SDUPTHER

## 2022-03-15 DIAGNOSIS — N52.9 ERECTILE DYSFUNCTION, UNSPECIFIED ERECTILE DYSFUNCTION TYPE: ICD-10-CM

## 2022-03-15 RX ORDER — SILDENAFIL 100 MG/1
100 TABLET, FILM COATED ORAL AS NEEDED
Qty: 30 TABLET | Refills: 1 | Status: SHIPPED | OUTPATIENT
Start: 2022-03-15 | End: 2022-05-16 | Stop reason: SDUPTHER

## 2022-03-15 NOTE — TELEPHONE ENCOUNTER
Caller: Kevin Clifton DOC    Relationship: Self    Best call back number: [phone]    Requested Prescriptions:   Requested Prescriptions     Pending Prescriptions Disp Refills   • sildenafil (VIAGRA) 100 MG tablet 30 tablet 1     Sig: Take 1 tablet by mouth As Needed for Erectile Dysfunction.        Pharmacy where request should be sent: [facility] [place] [address] [facility] [address] [phone] [facility] [phone] FX     Additional details provided by patient: PATIENT STATES THE PHARMACY GAVE HIM 50 MG BUT HE USUALLY GETS 100 MG. PATIENT STATES TO GIVE HIM A CALL TO LET HIM KNOW IF THE PRESCRIPTION IS SENT IN.    Does the patient have less than a 3 day supply:  [x] Yes  [] No    Vickie SHEARER Rep   03/15/22 14:24 EDT

## 2022-03-31 ENCOUNTER — TELEPHONE (OUTPATIENT)
Dept: CARDIOLOGY | Facility: CLINIC | Age: 52
End: 2022-03-31

## 2022-03-31 DIAGNOSIS — Z95.2 HX OF MITRAL VALVE REPLACEMENT WITH MECHANICAL VALVE: Primary | ICD-10-CM

## 2022-04-01 ENCOUNTER — LAB (OUTPATIENT)
Dept: LAB | Facility: HOSPITAL | Age: 52
End: 2022-04-01

## 2022-04-01 DIAGNOSIS — Z95.2 HX OF MITRAL VALVE REPLACEMENT WITH MECHANICAL VALVE: ICD-10-CM

## 2022-04-01 LAB
INR PPP: 3.06 (ref 2–3)
PROTHROMBIN TIME: 29.5 SECONDS (ref 9.4–12)

## 2022-04-01 PROCEDURE — 36415 COLL VENOUS BLD VENIPUNCTURE: CPT

## 2022-04-01 PROCEDURE — 85610 PROTHROMBIN TIME: CPT

## 2022-04-04 ENCOUNTER — ANTICOAGULATION VISIT (OUTPATIENT)
Dept: CARDIOLOGY | Facility: CLINIC | Age: 52
End: 2022-04-04

## 2022-04-04 DIAGNOSIS — Z95.2 HX OF MITRAL VALVE REPLACEMENT WITH MECHANICAL VALVE: Primary | ICD-10-CM

## 2022-04-04 NOTE — PROGRESS NOTES
Lab Results   Component Value Date    INR 3.06 (H) 04/01/2022    INR 2.53 02/15/2022    INR 2.58 01/18/2022    PROTIME 29.5 (H) 04/01/2022    PROTIME 24.5 (H) 02/15/2022    PROTIME 25.0 (H) 01/18/2022       INR: 3.06  Dose: 7mg/8mg  2.5-3.5  Mitral valve replacement

## 2022-04-05 ENCOUNTER — TELEPHONE (OUTPATIENT)
Dept: CARDIOLOGY | Facility: CLINIC | Age: 52
End: 2022-04-05

## 2022-04-22 ENCOUNTER — LAB (OUTPATIENT)
Dept: LAB | Facility: HOSPITAL | Age: 52
End: 2022-04-22

## 2022-04-22 DIAGNOSIS — Z95.2 HX OF MITRAL VALVE REPLACEMENT WITH MECHANICAL VALVE: ICD-10-CM

## 2022-04-22 LAB
INR PPP: 2.06 (ref 0.86–1.15)
PROTHROMBIN TIME: 23.6 SECONDS (ref 11.8–14.9)

## 2022-04-22 PROCEDURE — 36415 COLL VENOUS BLD VENIPUNCTURE: CPT

## 2022-04-22 PROCEDURE — 85610 PROTHROMBIN TIME: CPT

## 2022-04-25 ENCOUNTER — ANTICOAGULATION VISIT (OUTPATIENT)
Dept: CARDIOLOGY | Facility: CLINIC | Age: 52
End: 2022-04-25

## 2022-04-25 DIAGNOSIS — Z95.2 HX OF MITRAL VALVE REPLACEMENT WITH MECHANICAL VALVE: Primary | ICD-10-CM

## 2022-04-25 NOTE — PROGRESS NOTES
"Informed pt about INR  He wanted to make sure 2.06 was still \"normal\"  For his range  2.5-3.5.      "

## 2022-04-25 NOTE — PROGRESS NOTES
Spoke with pt and informed him to take 8 mg qd and recheck Friday.    Pt verbalized understanding.

## 2022-04-29 ENCOUNTER — ANTICOAGULATION VISIT (OUTPATIENT)
Dept: CARDIOLOGY | Facility: CLINIC | Age: 52
End: 2022-04-29

## 2022-04-29 ENCOUNTER — LAB (OUTPATIENT)
Dept: LAB | Facility: HOSPITAL | Age: 52
End: 2022-04-29

## 2022-04-29 DIAGNOSIS — Z95.2 HX OF MITRAL VALVE REPLACEMENT WITH MECHANICAL VALVE: Primary | ICD-10-CM

## 2022-04-29 DIAGNOSIS — Z95.2 HX OF MITRAL VALVE REPLACEMENT WITH MECHANICAL VALVE: ICD-10-CM

## 2022-04-29 LAB
INR PPP: 2.28 (ref 0.86–1.15)
PROTHROMBIN TIME: 25.6 SECONDS (ref 11.8–14.9)

## 2022-04-29 PROCEDURE — 36415 COLL VENOUS BLD VENIPUNCTURE: CPT

## 2022-04-29 PROCEDURE — 85610 PROTHROMBIN TIME: CPT

## 2022-05-02 ENCOUNTER — LAB (OUTPATIENT)
Dept: LAB | Facility: HOSPITAL | Age: 52
End: 2022-05-02

## 2022-05-02 ENCOUNTER — ANTICOAGULATION VISIT (OUTPATIENT)
Dept: CARDIOLOGY | Facility: CLINIC | Age: 52
End: 2022-05-02

## 2022-05-02 DIAGNOSIS — Z95.2 HX OF MITRAL VALVE REPLACEMENT WITH MECHANICAL VALVE: Primary | ICD-10-CM

## 2022-05-02 DIAGNOSIS — Z95.2 HX OF MITRAL VALVE REPLACEMENT WITH MECHANICAL VALVE: ICD-10-CM

## 2022-05-02 LAB
INR PPP: 2.7 (ref 0.86–1.15)
PROTHROMBIN TIME: 29.3 SECONDS (ref 11.8–14.9)

## 2022-05-02 PROCEDURE — 36415 COLL VENOUS BLD VENIPUNCTURE: CPT

## 2022-05-02 PROCEDURE — 85610 PROTHROMBIN TIME: CPT

## 2022-05-02 NOTE — PROGRESS NOTES
Lab Results   Component Value Date    INR 2.70 (H) 05/02/2022    INR 2.28 (H) 04/29/2022    INR 2.06 (H) 04/22/2022    PROTIME 29.3 (H) 05/02/2022    PROTIME 25.6 (H) 04/29/2022    PROTIME 23.6 (H) 04/22/2022       INR: 2.7  Dose: 9mg/8mg  Range: 2.5-3.5  Valve replacement

## 2022-05-06 ENCOUNTER — LAB (OUTPATIENT)
Dept: LAB | Facility: HOSPITAL | Age: 52
End: 2022-05-06

## 2022-05-06 DIAGNOSIS — Z95.2 HX OF MITRAL VALVE REPLACEMENT WITH MECHANICAL VALVE: ICD-10-CM

## 2022-05-06 LAB
INR PPP: 2.84 (ref 0.86–1.15)
PROTHROMBIN TIME: 30.4 SECONDS (ref 11.8–14.9)

## 2022-05-06 PROCEDURE — 85610 PROTHROMBIN TIME: CPT

## 2022-05-06 PROCEDURE — 36415 COLL VENOUS BLD VENIPUNCTURE: CPT

## 2022-05-09 ENCOUNTER — ANTICOAGULATION VISIT (OUTPATIENT)
Dept: CARDIOLOGY | Facility: CLINIC | Age: 52
End: 2022-05-09

## 2022-05-09 DIAGNOSIS — Z95.2 HX OF MITRAL VALVE REPLACEMENT WITH MECHANICAL VALVE: Primary | ICD-10-CM

## 2022-05-09 NOTE — PROGRESS NOTES
Lab Results   Component Value Date    INR 2.84 (H) 05/06/2022    INR 2.70 (H) 05/02/2022    INR 2.28 (H) 04/29/2022    PROTIME 30.4 (H) 05/06/2022    PROTIME 29.3 (H) 05/02/2022    PROTIME 25.6 (H) 04/29/2022     Taking 9/8  2.5-3.5  MVR

## 2022-05-16 ENCOUNTER — OFFICE VISIT (OUTPATIENT)
Dept: FAMILY MEDICINE CLINIC | Facility: CLINIC | Age: 52
End: 2022-05-16

## 2022-05-16 VITALS
BODY MASS INDEX: 32.38 KG/M2 | WEIGHT: 226.2 LBS | HEART RATE: 89 BPM | HEIGHT: 70 IN | OXYGEN SATURATION: 98 % | TEMPERATURE: 97.7 F | DIASTOLIC BLOOD PRESSURE: 88 MMHG | SYSTOLIC BLOOD PRESSURE: 142 MMHG

## 2022-05-16 DIAGNOSIS — Z12.5 SCREENING FOR PROSTATE CANCER: ICD-10-CM

## 2022-05-16 DIAGNOSIS — E11.9 TYPE 2 DIABETES MELLITUS WITHOUT COMPLICATION, WITHOUT LONG-TERM CURRENT USE OF INSULIN: ICD-10-CM

## 2022-05-16 DIAGNOSIS — N52.9 ERECTILE DYSFUNCTION, UNSPECIFIED ERECTILE DYSFUNCTION TYPE: ICD-10-CM

## 2022-05-16 DIAGNOSIS — E78.5 HYPERLIPIDEMIA, UNSPECIFIED HYPERLIPIDEMIA TYPE: Primary | ICD-10-CM

## 2022-05-16 DIAGNOSIS — Z95.2 HX OF MITRAL VALVE REPLACEMENT WITH MECHANICAL VALVE: ICD-10-CM

## 2022-05-16 DIAGNOSIS — I10 PRIMARY HYPERTENSION: ICD-10-CM

## 2022-05-16 PROCEDURE — 99214 OFFICE O/P EST MOD 30 MIN: CPT | Performed by: NURSE PRACTITIONER

## 2022-05-16 RX ORDER — SILDENAFIL 100 MG/1
100 TABLET, FILM COATED ORAL AS NEEDED
Qty: 30 TABLET | Refills: 1 | Status: SHIPPED | OUTPATIENT
Start: 2022-05-16 | End: 2022-12-13

## 2022-05-16 NOTE — PROGRESS NOTES
"Chief Complaint  Hypertension (6 month follow up )    Subjective          Kevin Clifton presents to [facility]  Presents to the office today for 6-month follow-up on his hypertension and his diabetes.  Blood pressure arrival today is 142/88.  Patient's last A1c was 5%.  Does state that he is needing a refill on his sildenafil.  He denies any other concerns or complaints at this time.  He does state that Dr. Kern keeps up with his INR is in regards to his Coumadin.  He does state that he needs another referral to Dr. Kern as his is about to .    Hypertension        Objective   Vital Signs:  /88 (BP Location: Right arm, Patient Position: Sitting, Cuff Size: Adult)   Pulse 89   Temp 97.7 °F (36.5 °C) (Temporal)   Ht 177.8 cm (70\")   Wt 103 kg (226 lb 3.2 oz)   SpO2 98%   BMI 32.46 kg/m²     BMI is >= 30 and <= 34.9 (Class 1 obesity). The following options were offered after discussion: nutrition counseling/recommendations      Physical Exam  Vitals reviewed.   Constitutional:       Appearance: Normal appearance.   Cardiovascular:      Rate and Rhythm: Normal rate and regular rhythm.      Pulses: Normal pulses.      Heart sounds: S1 normal and S2 normal. No murmur heard.     Comments: Mechanical valve clicks noted  Pulmonary:      Effort: Pulmonary effort is normal. No respiratory distress.      Breath sounds: Normal breath sounds.   Skin:     General: Skin is warm and dry.   Neurological:      Mental Status: He is alert and oriented to person, place, and time.   Psychiatric:         Attention and Perception: Attention normal.         Mood and Affect: Mood normal.         Behavior: Behavior normal.        Result Review :                 Assessment and Plan    Diagnoses and all orders for this visit:    1. Hyperlipidemia, unspecified hyperlipidemia type (Primary)  -     CBC & Differential; Future  -     Comprehensive Metabolic Panel; Future  -     Lipid Panel; " Future    2. Erectile dysfunction, unspecified erectile dysfunction type  -     sildenafil (VIAGRA) 100 MG tablet; Take 1 tablet by mouth As Needed for Erectile Dysfunction.  Dispense: 30 tablet; Refill: 1    3. Primary hypertension  -     CBC & Differential; Future  -     Comprehensive Metabolic Panel; Future    4. Type 2 diabetes mellitus without complication, without long-term current use of insulin (HCC)  -     CBC & Differential; Future  -     Comprehensive Metabolic Panel; Future  -     Hemoglobin A1c; Future  -     MicroAlbumin, Urine, Random - Urine, Clean Catch; Future    5. Screening for prostate cancer  -     PSA SCREENING; Future    6. Hx of mitral valve replacement with mechanical valve             Follow Up   Return in about 6 months (around 11/16/2022) for Recheck.  Patient was given instructions and counseling regarding his condition or for health maintenance advice. Please see specific information pulled into the AVS if appropriate.

## 2022-05-31 ENCOUNTER — LAB (OUTPATIENT)
Dept: LAB | Facility: HOSPITAL | Age: 52
End: 2022-05-31

## 2022-05-31 DIAGNOSIS — Z95.2 HX OF MITRAL VALVE REPLACEMENT WITH MECHANICAL VALVE: ICD-10-CM

## 2022-05-31 PROBLEM — I50.9 CHF (CONGESTIVE HEART FAILURE): Status: RESOLVED | Noted: 2021-11-05 | Resolved: 2022-05-31

## 2022-05-31 PROBLEM — R14.0 ABDOMINAL BLOATING: Status: RESOLVED | Noted: 2021-11-05 | Resolved: 2022-05-31

## 2022-05-31 PROBLEM — R12 HEARTBURN: Status: RESOLVED | Noted: 2021-11-05 | Resolved: 2022-05-31

## 2022-05-31 PROBLEM — R10.32 LLQ ABDOMINAL PAIN: Status: RESOLVED | Noted: 2021-11-05 | Resolved: 2022-05-31

## 2022-05-31 PROBLEM — I48.0 PAROXYSMAL ATRIAL FIBRILLATION: Status: ACTIVE | Noted: 2022-05-31

## 2022-05-31 LAB
INR PPP: 3 (ref 0.86–1.15)
PROTHROMBIN TIME: 31.8 SECONDS (ref 11.8–14.9)

## 2022-05-31 PROCEDURE — 36415 COLL VENOUS BLD VENIPUNCTURE: CPT

## 2022-05-31 PROCEDURE — 85610 PROTHROMBIN TIME: CPT

## 2022-06-01 ENCOUNTER — OFFICE VISIT (OUTPATIENT)
Dept: CARDIOLOGY | Facility: CLINIC | Age: 52
End: 2022-06-01

## 2022-06-01 ENCOUNTER — ANTICOAGULATION VISIT (OUTPATIENT)
Dept: CARDIOLOGY | Facility: CLINIC | Age: 52
End: 2022-06-01

## 2022-06-01 VITALS
SYSTOLIC BLOOD PRESSURE: 134 MMHG | WEIGHT: 222.2 LBS | BODY MASS INDEX: 31.81 KG/M2 | OXYGEN SATURATION: 99 % | DIASTOLIC BLOOD PRESSURE: 88 MMHG | HEIGHT: 70 IN | HEART RATE: 87 BPM

## 2022-06-01 DIAGNOSIS — E78.2 MIXED HYPERLIPIDEMIA: ICD-10-CM

## 2022-06-01 DIAGNOSIS — I48.0 PAROXYSMAL ATRIAL FIBRILLATION: ICD-10-CM

## 2022-06-01 DIAGNOSIS — I42.1 HOCM (HYPERTROPHIC OBSTRUCTIVE CARDIOMYOPATHY): Primary | ICD-10-CM

## 2022-06-01 DIAGNOSIS — I10 PRIMARY HYPERTENSION: ICD-10-CM

## 2022-06-01 DIAGNOSIS — Z95.2 HX OF MITRAL VALVE REPLACEMENT WITH MECHANICAL VALVE: Primary | ICD-10-CM

## 2022-06-01 DIAGNOSIS — Z95.2 HX OF MITRAL VALVE REPLACEMENT WITH MECHANICAL VALVE: ICD-10-CM

## 2022-06-01 PROCEDURE — 93000 ELECTROCARDIOGRAM COMPLETE: CPT | Performed by: NURSE PRACTITIONER

## 2022-06-01 PROCEDURE — 99214 OFFICE O/P EST MOD 30 MIN: CPT | Performed by: NURSE PRACTITIONER

## 2022-06-01 RX ORDER — LISINOPRIL 10 MG/1
10 TABLET ORAL DAILY
Qty: 90 TABLET | Refills: 3 | Status: SHIPPED | OUTPATIENT
Start: 2022-06-01

## 2022-06-01 NOTE — PROGRESS NOTES
Lab Results   Component Value Date    INR 3.00 (H) 05/31/2022    INR 2.84 (H) 05/06/2022    INR 2.70 (H) 05/02/2022    PROTIME 31.8 (H) 05/31/2022    PROTIME 30.4 (H) 05/06/2022    PROTIME 29.3 (H) 05/02/2022       INR: 3.0  Dose: 9mg/8mg  Range: 2.5-3.5

## 2022-06-15 ENCOUNTER — LAB (OUTPATIENT)
Dept: LAB | Facility: HOSPITAL | Age: 52
End: 2022-06-15

## 2022-06-15 ENCOUNTER — ANTICOAGULATION VISIT (OUTPATIENT)
Dept: CARDIOLOGY | Facility: CLINIC | Age: 52
End: 2022-06-15

## 2022-06-15 DIAGNOSIS — Z12.5 SCREENING FOR PROSTATE CANCER: ICD-10-CM

## 2022-06-15 DIAGNOSIS — Z95.2 HX OF MITRAL VALVE REPLACEMENT WITH MECHANICAL VALVE: ICD-10-CM

## 2022-06-15 DIAGNOSIS — Z95.2 HX OF MITRAL VALVE REPLACEMENT WITH MECHANICAL VALVE: Primary | ICD-10-CM

## 2022-06-15 DIAGNOSIS — I10 PRIMARY HYPERTENSION: ICD-10-CM

## 2022-06-15 DIAGNOSIS — E11.9 TYPE 2 DIABETES MELLITUS WITHOUT COMPLICATION, WITHOUT LONG-TERM CURRENT USE OF INSULIN: ICD-10-CM

## 2022-06-15 DIAGNOSIS — E78.5 HYPERLIPIDEMIA, UNSPECIFIED HYPERLIPIDEMIA TYPE: ICD-10-CM

## 2022-06-15 LAB
ALBUMIN SERPL-MCNC: 4.2 G/DL (ref 3.5–5.2)
ALBUMIN UR-MCNC: 1.2 MG/DL
ALBUMIN/GLOB SERPL: 1.6 G/DL
ALP SERPL-CCNC: 44 U/L (ref 39–117)
ALT SERPL W P-5'-P-CCNC: 28 U/L (ref 1–41)
ANION GAP SERPL CALCULATED.3IONS-SCNC: 6.7 MMOL/L (ref 5–15)
AST SERPL-CCNC: 28 U/L (ref 1–40)
BASOPHILS # BLD AUTO: 0.02 10*3/MM3 (ref 0–0.2)
BASOPHILS NFR BLD AUTO: 0.5 % (ref 0–1.5)
BILIRUB SERPL-MCNC: 0.6 MG/DL (ref 0–1.2)
BUN SERPL-MCNC: 11 MG/DL (ref 6–20)
BUN/CREAT SERPL: 9.6 (ref 7–25)
CALCIUM SPEC-SCNC: 9 MG/DL (ref 8.6–10.5)
CHLORIDE SERPL-SCNC: 107 MMOL/L (ref 98–107)
CHOLEST SERPL-MCNC: 196 MG/DL (ref 0–200)
CO2 SERPL-SCNC: 24.3 MMOL/L (ref 22–29)
CREAT SERPL-MCNC: 1.14 MG/DL (ref 0.76–1.27)
DEPRECATED RDW RBC AUTO: 36.3 FL (ref 37–54)
EGFRCR SERPLBLD CKD-EPI 2021: 77.9 ML/MIN/1.73
EOSINOPHIL # BLD AUTO: 0.09 10*3/MM3 (ref 0–0.4)
EOSINOPHIL NFR BLD AUTO: 2.1 % (ref 0.3–6.2)
ERYTHROCYTE [DISTWIDTH] IN BLOOD BY AUTOMATED COUNT: 13.9 % (ref 12.3–15.4)
GLOBULIN UR ELPH-MCNC: 2.7 GM/DL
GLUCOSE SERPL-MCNC: 84 MG/DL (ref 65–99)
HBA1C MFR BLD: 6 % (ref 4.8–5.6)
HCT VFR BLD AUTO: 43.3 % (ref 37.5–51)
HDLC SERPL-MCNC: 46 MG/DL (ref 40–60)
HGB BLD-MCNC: 13.1 G/DL (ref 13–17.7)
IMM GRANULOCYTES # BLD AUTO: 0.01 10*3/MM3 (ref 0–0.05)
IMM GRANULOCYTES NFR BLD AUTO: 0.2 % (ref 0–0.5)
INR PPP: 2.31 (ref 0.86–1.15)
LDLC SERPL CALC-MCNC: 129 MG/DL (ref 0–100)
LDLC/HDLC SERPL: 2.74 {RATIO}
LYMPHOCYTES # BLD AUTO: 1.58 10*3/MM3 (ref 0.7–3.1)
LYMPHOCYTES NFR BLD AUTO: 37.4 % (ref 19.6–45.3)
MCH RBC QN AUTO: 22.9 PG (ref 26.6–33)
MCHC RBC AUTO-ENTMCNC: 30.3 G/DL (ref 31.5–35.7)
MCV RBC AUTO: 75.8 FL (ref 79–97)
MONOCYTES # BLD AUTO: 0.63 10*3/MM3 (ref 0.1–0.9)
MONOCYTES NFR BLD AUTO: 14.9 % (ref 5–12)
NEUTROPHILS NFR BLD AUTO: 1.89 10*3/MM3 (ref 1.7–7)
NEUTROPHILS NFR BLD AUTO: 44.9 % (ref 42.7–76)
NRBC BLD AUTO-RTO: 0 /100 WBC (ref 0–0.2)
PLATELET # BLD AUTO: 279 10*3/MM3 (ref 140–450)
PMV BLD AUTO: 10.2 FL (ref 6–12)
POTASSIUM SERPL-SCNC: 4.1 MMOL/L (ref 3.5–5.2)
PROT SERPL-MCNC: 6.9 G/DL (ref 6–8.5)
PROTHROMBIN TIME: 25.9 SECONDS (ref 11.8–14.9)
PSA SERPL-MCNC: 0.41 NG/ML (ref 0–4)
RBC # BLD AUTO: 5.71 10*6/MM3 (ref 4.14–5.8)
SODIUM SERPL-SCNC: 138 MMOL/L (ref 136–145)
TRIGL SERPL-MCNC: 119 MG/DL (ref 0–150)
VLDLC SERPL-MCNC: 21 MG/DL (ref 5–40)
WBC NRBC COR # BLD: 4.22 10*3/MM3 (ref 3.4–10.8)

## 2022-06-15 PROCEDURE — 80053 COMPREHEN METABOLIC PANEL: CPT

## 2022-06-15 PROCEDURE — 85025 COMPLETE CBC W/AUTO DIFF WBC: CPT

## 2022-06-15 PROCEDURE — 36415 COLL VENOUS BLD VENIPUNCTURE: CPT

## 2022-06-15 PROCEDURE — 80061 LIPID PANEL: CPT

## 2022-06-15 PROCEDURE — G0103 PSA SCREENING: HCPCS

## 2022-06-15 PROCEDURE — 82043 UR ALBUMIN QUANTITATIVE: CPT

## 2022-06-15 PROCEDURE — 83036 HEMOGLOBIN GLYCOSYLATED A1C: CPT

## 2022-06-15 PROCEDURE — 85610 PROTHROMBIN TIME: CPT

## 2022-06-15 NOTE — PROGRESS NOTES
Lab Results   Component Value Date    INR 2.31 (H) 06/15/2022    INR 3.00 (H) 05/31/2022    INR 2.84 (H) 05/06/2022    PROTIME 25.9 (H) 06/15/2022    PROTIME 31.8 (H) 05/31/2022    PROTIME 30.4 (H) 05/06/2022       INR: 2.31  Dose: 9mg/8mg  Range: 2.5-3.5

## 2022-06-21 ENCOUNTER — LAB (OUTPATIENT)
Dept: LAB | Facility: HOSPITAL | Age: 52
End: 2022-06-21

## 2022-06-21 DIAGNOSIS — Z95.2 HX OF MITRAL VALVE REPLACEMENT WITH MECHANICAL VALVE: ICD-10-CM

## 2022-06-21 LAB
INR PPP: 2.8 (ref 0.86–1.15)
PROTHROMBIN TIME: 30.1 SECONDS (ref 11.8–14.9)

## 2022-06-21 PROCEDURE — 85610 PROTHROMBIN TIME: CPT

## 2022-06-21 PROCEDURE — 36415 COLL VENOUS BLD VENIPUNCTURE: CPT

## 2022-06-22 ENCOUNTER — ANTICOAGULATION VISIT (OUTPATIENT)
Dept: CARDIOLOGY | Facility: CLINIC | Age: 52
End: 2022-06-22

## 2022-06-22 DIAGNOSIS — Z95.2 HX OF MITRAL VALVE REPLACEMENT WITH MECHANICAL VALVE: Primary | ICD-10-CM

## 2022-06-22 NOTE — PROGRESS NOTES
Lab Results   Component Value Date    INR 2.80 (H) 06/21/2022    INR 2.31 (H) 06/15/2022    INR 3.00 (H) 05/31/2022    PROTIME 30.1 (H) 06/21/2022    PROTIME 25.9 (H) 06/15/2022    PROTIME 31.8 (H) 05/31/2022       INR: 2.8  Dose: 8mg/9mg/9mg  Range: 2.5-3.5

## 2022-06-22 NOTE — PROGRESS NOTES
Left detailed message for the pt. Asked that he call back and let us know he received instructions

## 2022-07-05 ENCOUNTER — LAB (OUTPATIENT)
Dept: LAB | Facility: HOSPITAL | Age: 52
End: 2022-07-05

## 2022-07-05 DIAGNOSIS — Z95.2 HX OF MITRAL VALVE REPLACEMENT WITH MECHANICAL VALVE: ICD-10-CM

## 2022-07-05 LAB
INR PPP: 2.69 (ref 0.86–1.15)
PROTHROMBIN TIME: 29.2 SECONDS (ref 11.8–14.9)

## 2022-07-05 PROCEDURE — 36415 COLL VENOUS BLD VENIPUNCTURE: CPT

## 2022-07-05 PROCEDURE — 85610 PROTHROMBIN TIME: CPT

## 2022-07-06 ENCOUNTER — ANTICOAGULATION VISIT (OUTPATIENT)
Dept: CARDIOLOGY | Facility: CLINIC | Age: 52
End: 2022-07-06

## 2022-07-06 ENCOUNTER — TELEPHONE (OUTPATIENT)
Dept: CARDIOLOGY | Facility: CLINIC | Age: 52
End: 2022-07-06

## 2022-07-06 DIAGNOSIS — Z95.2 HX OF MITRAL VALVE REPLACEMENT WITH MECHANICAL VALVE: Primary | ICD-10-CM

## 2022-07-06 NOTE — TELEPHONE ENCOUNTER
----- Message from GABY Perkins sent at 7/6/2022  8:58 AM EDT -----      ----- Message -----  From: Saritha Lincoln  Sent: 7/6/2022   8:45 AM EDT  To: GABY Perkins

## 2022-07-29 ENCOUNTER — LAB (OUTPATIENT)
Dept: LAB | Facility: HOSPITAL | Age: 52
End: 2022-07-29

## 2022-07-29 ENCOUNTER — ANTICOAGULATION VISIT (OUTPATIENT)
Dept: CARDIOLOGY | Facility: CLINIC | Age: 52
End: 2022-07-29

## 2022-07-29 DIAGNOSIS — Z95.2 HX OF MITRAL VALVE REPLACEMENT WITH MECHANICAL VALVE: ICD-10-CM

## 2022-07-29 DIAGNOSIS — Z95.2 HX OF MITRAL VALVE REPLACEMENT WITH MECHANICAL VALVE: Primary | ICD-10-CM

## 2022-07-29 LAB
INR PPP: 2.05 (ref 0.86–1.15)
PROTHROMBIN TIME: 23.5 SECONDS (ref 11.8–14.9)

## 2022-07-29 PROCEDURE — 36415 COLL VENOUS BLD VENIPUNCTURE: CPT

## 2022-07-29 PROCEDURE — 85610 PROTHROMBIN TIME: CPT

## 2022-07-29 NOTE — PROGRESS NOTES
Lab Results   Component Value Date    INR 2.05 (H) 07/29/2022    INR 2.69 (H) 07/05/2022    INR 2.80 (H) 06/21/2022    PROTIME 23.5 (H) 07/29/2022    PROTIME 29.2 (H) 07/05/2022    PROTIME 30.1 (H) 06/21/2022     DX:HX of mitral valve replacement with mechanical valve   Range:2.5-3.5  Dose:8/9/9mg   [facility]

## 2022-08-09 ENCOUNTER — LAB (OUTPATIENT)
Dept: LAB | Facility: HOSPITAL | Age: 52
End: 2022-08-09

## 2022-08-09 ENCOUNTER — ANTICOAGULATION VISIT (OUTPATIENT)
Dept: CARDIOLOGY | Facility: CLINIC | Age: 52
End: 2022-08-09

## 2022-08-09 DIAGNOSIS — Z95.2 HX OF MITRAL VALVE REPLACEMENT WITH MECHANICAL VALVE: ICD-10-CM

## 2022-08-09 DIAGNOSIS — Z95.2 HX OF MITRAL VALVE REPLACEMENT WITH MECHANICAL VALVE: Primary | ICD-10-CM

## 2022-08-09 LAB
INR PPP: 3.84 (ref 0.86–1.15)
PROTHROMBIN TIME: 38.7 SECONDS (ref 11.8–14.9)

## 2022-08-09 PROCEDURE — 36415 COLL VENOUS BLD VENIPUNCTURE: CPT

## 2022-08-09 PROCEDURE — 85610 PROTHROMBIN TIME: CPT

## 2022-08-09 NOTE — PROGRESS NOTES
Lab Results   Component Value Date    INR 3.84 (H) 08/09/2022    INR 2.05 (H) 07/29/2022    INR 2.69 (H) 07/05/2022    PROTIME 38.7 (H) 08/09/2022    PROTIME 23.5 (H) 07/29/2022    PROTIME 29.2 (H) 07/05/2022       INR 3.8 on 9 mg qd.  lab. Mitral valve replacement, mechanical. Range 2.5-3.5. No diet/ med changes.

## 2022-08-12 ENCOUNTER — LAB (OUTPATIENT)
Dept: LAB | Facility: HOSPITAL | Age: 52
End: 2022-08-12

## 2022-08-12 DIAGNOSIS — Z95.2 HX OF MITRAL VALVE REPLACEMENT WITH MECHANICAL VALVE: ICD-10-CM

## 2022-08-12 LAB
INR PPP: 2.5 (ref 0.86–1.15)
PROTHROMBIN TIME: 27.6 SECONDS (ref 11.8–14.9)

## 2022-08-12 PROCEDURE — 36415 COLL VENOUS BLD VENIPUNCTURE: CPT

## 2022-08-12 PROCEDURE — 85610 PROTHROMBIN TIME: CPT

## 2022-08-15 ENCOUNTER — ANTICOAGULATION VISIT (OUTPATIENT)
Dept: CARDIOLOGY | Facility: CLINIC | Age: 52
End: 2022-08-15

## 2022-08-15 DIAGNOSIS — Z95.2 HX OF MITRAL VALVE REPLACEMENT WITH MECHANICAL VALVE: Primary | ICD-10-CM

## 2022-08-15 NOTE — PROGRESS NOTES
Lab Results   Component Value Date    INR 2.50 (H) 08/12/2022    INR 3.84 (H) 08/09/2022    INR 2.05 (H) 07/29/2022    PROTIME 27.6 (H) 08/12/2022    PROTIME 38.7 (H) 08/09/2022    PROTIME 23.5 (H) 07/29/2022       Date: 08/12/2022  INR: 2.5  Dosage: 8/9 alternating  Range: 2.5 - 3.5  Diagnosis: Hx of mitral valve replacement with mechanical valve

## 2022-08-18 ENCOUNTER — LAB (OUTPATIENT)
Dept: LAB | Facility: HOSPITAL | Age: 52
End: 2022-08-18

## 2022-08-18 DIAGNOSIS — Z95.2 HX OF MITRAL VALVE REPLACEMENT WITH MECHANICAL VALVE: ICD-10-CM

## 2022-08-18 LAB
INR PPP: 2.46 (ref 0.86–1.15)
PROTHROMBIN TIME: 27.2 SECONDS (ref 11.8–14.9)

## 2022-08-18 PROCEDURE — 85610 PROTHROMBIN TIME: CPT

## 2022-08-18 PROCEDURE — 36415 COLL VENOUS BLD VENIPUNCTURE: CPT

## 2022-08-19 ENCOUNTER — ANTICOAGULATION VISIT (OUTPATIENT)
Dept: CARDIOLOGY | Facility: CLINIC | Age: 52
End: 2022-08-19

## 2022-08-19 DIAGNOSIS — Z95.2 HX OF MITRAL VALVE REPLACEMENT WITH MECHANICAL VALVE: Primary | ICD-10-CM

## 2022-08-19 NOTE — PROGRESS NOTES
Lab Results   Component Value Date    INR 2.46 (H) 08/18/2022    INR 2.50 (H) 08/12/2022    INR 3.84 (H) 08/09/2022    PROTIME 27.2 (H) 08/18/2022    PROTIME 27.6 (H) 08/12/2022    PROTIME 38.7 (H) 08/09/2022     INR 2.46 on alt 8/ 9 mg. Range 2.5-3.5.  lab. Hx of mitral valve replacement, mechanical valve.

## 2022-09-02 ENCOUNTER — LAB (OUTPATIENT)
Dept: LAB | Facility: HOSPITAL | Age: 52
End: 2022-09-02

## 2022-09-02 DIAGNOSIS — Z95.2 HX OF MITRAL VALVE REPLACEMENT WITH MECHANICAL VALVE: ICD-10-CM

## 2022-09-02 LAB
INR PPP: 2.23 (ref 0.86–1.15)
PROTHROMBIN TIME: 25.1 SECONDS (ref 11.8–14.9)

## 2022-09-02 PROCEDURE — 36415 COLL VENOUS BLD VENIPUNCTURE: CPT

## 2022-09-02 PROCEDURE — 85610 PROTHROMBIN TIME: CPT

## 2022-09-06 ENCOUNTER — ANTICOAGULATION VISIT (OUTPATIENT)
Dept: CARDIOLOGY | Facility: CLINIC | Age: 52
End: 2022-09-06

## 2022-09-06 DIAGNOSIS — Z95.2 HX OF MITRAL VALVE REPLACEMENT WITH MECHANICAL VALVE: Primary | ICD-10-CM

## 2022-09-06 NOTE — PROGRESS NOTES
Lab Results   Component Value Date    INR 2.23 (H) 09/02/2022    INR 2.46 (H) 08/18/2022    INR 2.50 (H) 08/12/2022    PROTIME 25.1 (H) 09/02/2022    PROTIME 27.2 (H) 08/18/2022    PROTIME 27.6 (H) 08/12/2022     DX:Hx of mitral valve replacement with mechanical valve   Range:2.5-3.5  Dose:8/9/9  [facility]

## 2022-09-16 ENCOUNTER — ANTICOAGULATION VISIT (OUTPATIENT)
Dept: CARDIOLOGY | Facility: CLINIC | Age: 52
End: 2022-09-16

## 2022-09-16 ENCOUNTER — LAB (OUTPATIENT)
Dept: LAB | Facility: HOSPITAL | Age: 52
End: 2022-09-16

## 2022-09-16 DIAGNOSIS — Z95.2 HX OF MITRAL VALVE REPLACEMENT WITH MECHANICAL VALVE: ICD-10-CM

## 2022-09-16 DIAGNOSIS — Z95.2 HX OF MITRAL VALVE REPLACEMENT WITH MECHANICAL VALVE: Primary | ICD-10-CM

## 2022-09-16 LAB
INR PPP: 2.36 (ref 0.86–1.15)
PROTHROMBIN TIME: 26.3 SECONDS (ref 11.8–14.9)

## 2022-09-16 PROCEDURE — 36415 COLL VENOUS BLD VENIPUNCTURE: CPT

## 2022-09-16 PROCEDURE — 85610 PROTHROMBIN TIME: CPT

## 2022-09-16 NOTE — PROGRESS NOTES
Lab Results   Component Value Date    INR 2.36 (H) 09/16/2022    INR 2.23 (H) 09/02/2022    INR 2.46 (H) 08/18/2022    PROTIME 26.3 (H) 09/16/2022    PROTIME 25.1 (H) 09/02/2022    PROTIME 27.2 (H) 08/18/2022       Date: 09/16/2022  INR: 2.36  Dosage: 9mg qd  Range: 2.5 - 3.5  Diagnosis: Hx of mitral valve replacement with mechanical valve

## 2022-09-27 ENCOUNTER — OFFICE VISIT (OUTPATIENT)
Dept: FAMILY MEDICINE CLINIC | Facility: CLINIC | Age: 52
End: 2022-09-27

## 2022-09-27 ENCOUNTER — LAB (OUTPATIENT)
Dept: LAB | Facility: HOSPITAL | Age: 52
End: 2022-09-27

## 2022-09-27 ENCOUNTER — ANTICOAGULATION VISIT (OUTPATIENT)
Dept: CARDIOLOGY | Facility: CLINIC | Age: 52
End: 2022-09-27

## 2022-09-27 VITALS
OXYGEN SATURATION: 97 % | HEART RATE: 91 BPM | TEMPERATURE: 98 F | DIASTOLIC BLOOD PRESSURE: 82 MMHG | RESPIRATION RATE: 18 BRPM | SYSTOLIC BLOOD PRESSURE: 116 MMHG

## 2022-09-27 DIAGNOSIS — R05.9 COUGH: ICD-10-CM

## 2022-09-27 DIAGNOSIS — U07.1 COVID-19: Primary | ICD-10-CM

## 2022-09-27 DIAGNOSIS — U07.1 POSITIVE SELF-ADMINISTERED ANTIGEN TEST FOR COVID-19: ICD-10-CM

## 2022-09-27 DIAGNOSIS — R09.89 CHEST CONGESTION: ICD-10-CM

## 2022-09-27 DIAGNOSIS — Z95.2 HX OF MITRAL VALVE REPLACEMENT WITH MECHANICAL VALVE: ICD-10-CM

## 2022-09-27 DIAGNOSIS — Z95.2 HX OF MITRAL VALVE REPLACEMENT WITH MECHANICAL VALVE: Primary | ICD-10-CM

## 2022-09-27 LAB
INR PPP: 3.27 (ref 0.86–1.15)
PROTHROMBIN TIME: 34.1 SECONDS (ref 11.8–14.9)
SARS-COV-2 RNA PNL SPEC NAA+PROBE: DETECTED

## 2022-09-27 PROCEDURE — 99213 OFFICE O/P EST LOW 20 MIN: CPT

## 2022-09-27 PROCEDURE — 85610 PROTHROMBIN TIME: CPT

## 2022-09-27 PROCEDURE — C9803 HOPD COVID-19 SPEC COLLECT: HCPCS

## 2022-09-27 PROCEDURE — U0004 COV-19 TEST NON-CDC HGH THRU: HCPCS

## 2022-09-27 PROCEDURE — 36415 COLL VENOUS BLD VENIPUNCTURE: CPT

## 2022-09-27 RX ORDER — DEXTROMETHORPHAN HYDROBROMIDE AND PROMETHAZINE HYDROCHLORIDE 15; 6.25 MG/5ML; MG/5ML
5 SYRUP ORAL 4 TIMES DAILY PRN
Qty: 180 ML | Refills: 0 | Status: SHIPPED | OUTPATIENT
Start: 2022-09-27 | End: 2022-11-14

## 2022-09-27 RX ORDER — AMOXICILLIN 500 MG/1
CAPSULE ORAL
COMMUNITY
Start: 2022-06-21 | End: 2022-09-27

## 2022-09-27 RX ORDER — AMOXICILLIN 500 MG/1
1000 CAPSULE ORAL 2 TIMES DAILY
Qty: 28 CAPSULE | Refills: 0 | Status: SHIPPED | OUTPATIENT
Start: 2022-09-27 | End: 2022-11-14

## 2022-09-27 NOTE — PROGRESS NOTES
Kevin Clifton presents to [facility] with complaints of recent diagnosis of COVID-19, chest congestion, cough, and fever.      History of Present Illness  This is a 51-year-old male who presents to clinic with complaints of recent diagnosis of COVID-19, chest congestion, cough, and fever.    Patient states that his symptoms started yesterday, originally started with a headache, and a cough.  Then progressed to this morning he did have a low-grade fever of 100.5, then this transition to cough with phlegm production.  States that he is coughing up green to yellow type of phlegm.  He did do a COVID test this morning at home, which was positive.  And thus came in for evaluation.  He was told that he may need a medication for this due to him being high risk.  States that he has had a persistent headache, denies any chest pain or shortness of breath.  Denies any GI symptoms.  States overall he feels okay, but just want to come in to make sure there is nothing else that he needed to do.    The following portions of the patient's history were personally reviewed and updated as appropriate: allergies, current medications, past medical history, past surgical history, past family history, and past social history.       Objective   Vital Signs:   /82   Pulse 91   Temp 98 °F (36.7 °C)   Resp 18   SpO2 97%     There is no height or weight on file to calculate BMI.    All labs, imaging, test results, and specialty provider notes reviewed with patient.     Physical Exam  Vitals reviewed.   Constitutional:       Appearance: Normal appearance.   Cardiovascular:      Rate and Rhythm: Normal rate and regular rhythm.      Pulses: Normal pulses.      Heart sounds: Normal heart sounds.   Pulmonary:      Effort: Pulmonary effort is normal.      Breath sounds: Normal breath sounds.   Neurological:      General: No focal deficit present.      Mental Status: He is alert and oriented to person,  place, and time.            Assessment and Plan:  Diagnoses and all orders for this visit:    1. COVID-19 (Primary)    2. Positive self-administered antigen test for COVID-19  -     COVID-19,APTIMA PANTHER(ROVERTO),[facility]/ [facility], NP/OP SWAB IN UTM/VTM/SALINE TRANSPORT MEDIA,24 HR TAT - Swab, Nasal Cavity    3. Cough  -     promethazine-dextromethorphan (PROMETHAZINE-DM) 6.25-15 MG/5ML syrup; Take 5 mL by mouth 4 (Four) Times a Day As Needed for Cough.  Dispense: 180 mL; Refill: 0  -     amoxicillin (AMOXIL) 500 MG capsule; Take 2 capsules by mouth 2 (Two) Times a Day.  Dispense: 28 capsule; Refill: 0    4. Chest congestion  -     amoxicillin (AMOXIL) 500 MG capsule; Take 2 capsules by mouth 2 (Two) Times a Day.  Dispense: 28 capsule; Refill: 0      Discussed with patient that he is not a candidate for Paxil of it because he is on Coumadin.  Did discuss with him also that we will give him a short course of amoxicillin to help prevent any secondary bacterial infection, will also give him some cough syrup and instructed him to start taking some Mucinex over-the-counter as well to help with further decongestion.  Did discuss that if symptoms worsen, he develops any worsening symptoms such as shortness of breath or chest pain, he should seek emergency evaluation.  We will go ahead and test for PCR for COVID per patient request.  Discussed CDC guidelines for quarantining as well.    Follow Up:  No follow-ups on file.    Patient was given instructions and counseling regarding his condition or for health maintenance advice. Please see specific information pulled into the AVS if appropriate.

## 2022-09-27 NOTE — PROGRESS NOTES
Lab Results   Component Value Date    INR 3.27 (H) 09/27/2022    INR 2.36 (H) 09/16/2022    INR 2.23 (H) 09/02/2022    PROTIME 34.1 (H) 09/27/2022    PROTIME 26.3 (H) 09/16/2022    PROTIME 25.1 (H) 09/02/2022     INR 3.27 on alt 9/10 mg. Range 2.5-3.5  Hx of mitral valve replacement with mechanical valve  [facility] lab   Arava Counseling:  Patient counseled regarding adverse effects of Arava including but not limited to nausea, vomiting, abnormalities in liver function tests. Patients may develop mouth sores, rash, diarrhea, and abnormalities in blood counts. The patient understands that monitoring is required including LFTs and blood counts.  There is a rare possibility of scarring of the liver and lung problems that can occur when taking methotrexate. Persistent nausea, loss of appetite, pale stools, dark urine, cough, and shortness of breath should be reported immediately. Patient advised to discontinue Arava treatment and consult with a physician prior to attempting conception. The patient will have to undergo a treatment to eliminate Arava from the body prior to conception.

## 2022-10-19 ENCOUNTER — LAB (OUTPATIENT)
Dept: LAB | Facility: HOSPITAL | Age: 52
End: 2022-10-19

## 2022-10-19 DIAGNOSIS — Z95.2 HX OF MITRAL VALVE REPLACEMENT WITH MECHANICAL VALVE: ICD-10-CM

## 2022-10-19 LAB
INR PPP: 3.73 (ref 0.86–1.15)
PROTHROMBIN TIME: 37.8 SECONDS (ref 11.8–14.9)

## 2022-10-19 PROCEDURE — 36415 COLL VENOUS BLD VENIPUNCTURE: CPT

## 2022-10-19 PROCEDURE — 85610 PROTHROMBIN TIME: CPT

## 2022-10-20 ENCOUNTER — ANTICOAGULATION VISIT (OUTPATIENT)
Dept: CARDIOLOGY | Facility: CLINIC | Age: 52
End: 2022-10-20

## 2022-10-20 DIAGNOSIS — Z95.2 HX OF MITRAL VALVE REPLACEMENT WITH MECHANICAL VALVE: Primary | ICD-10-CM

## 2022-10-20 RX ORDER — WARFARIN SODIUM 4 MG/1
TABLET ORAL
Qty: 90 TABLET | Refills: 3 | Status: SHIPPED | OUTPATIENT
Start: 2022-10-20 | End: 2023-02-22 | Stop reason: SDUPTHER

## 2022-10-20 RX ORDER — WARFARIN SODIUM 3 MG/1
TABLET ORAL
Qty: 90 TABLET | Refills: 3 | Status: SHIPPED | OUTPATIENT
Start: 2022-10-20 | End: 2023-02-22 | Stop reason: SDUPTHER

## 2022-10-20 RX ORDER — WARFARIN SODIUM 5 MG/1
TABLET ORAL
Qty: 90 TABLET | Refills: 3 | Status: SHIPPED | OUTPATIENT
Start: 2022-10-20 | End: 2023-02-22 | Stop reason: SDUPTHER

## 2022-10-20 NOTE — PROGRESS NOTES
Date: 10/19/2022  INR: 3.73  Dosage: 10/9 alt  Range: 2.5 - 3.5  Diagnosis: H/O MVR, mechanical       INR above goal.  Decrease warfarin to 9 mg daily.  Recheck in 5 days.

## 2022-10-20 NOTE — PROGRESS NOTES
I spoke to patient and gave results and new orders. I also sent refills on all warfarin tablets. No concerns.

## 2022-10-20 NOTE — PROGRESS NOTES
Please let patient know his level is slightly elevated.  Decrease to 9/9/10 mg  Recheck INR on Monday.

## 2022-10-20 NOTE — PROGRESS NOTES
Lab Results   Component Value Date    INR 3.73 (H) 10/19/2022    INR 3.27 (H) 09/27/2022    INR 2.36 (H) 09/16/2022    PROTIME 37.8 (H) 10/19/2022    PROTIME 34.1 (H) 09/27/2022    PROTIME 26.3 (H) 09/16/2022       Date: 10/19/2022  INR: 3.73  Dosage: 10/9 alt  Range: 2.5 - 3.5  Diagnosis: H/O MVR, mechanical

## 2022-10-26 ENCOUNTER — LAB (OUTPATIENT)
Dept: LAB | Facility: HOSPITAL | Age: 52
End: 2022-10-26

## 2022-10-26 DIAGNOSIS — Z95.2 HX OF MITRAL VALVE REPLACEMENT WITH MECHANICAL VALVE: ICD-10-CM

## 2022-10-26 LAB
INR PPP: 3.21 (ref 0.86–1.15)
PROTHROMBIN TIME: 33.5 SECONDS (ref 11.8–14.9)

## 2022-10-26 PROCEDURE — 36415 COLL VENOUS BLD VENIPUNCTURE: CPT

## 2022-10-26 PROCEDURE — 85610 PROTHROMBIN TIME: CPT

## 2022-10-27 ENCOUNTER — ANTICOAGULATION VISIT (OUTPATIENT)
Dept: CARDIOLOGY | Facility: CLINIC | Age: 52
End: 2022-10-27

## 2022-10-27 DIAGNOSIS — Z95.2 HX OF MITRAL VALVE REPLACEMENT WITH MECHANICAL VALVE: Primary | ICD-10-CM

## 2022-10-27 NOTE — PROGRESS NOTES
Lab Results   Component Value Date    INR 3.21 (H) 10/26/2022    INR 3.73 (H) 10/19/2022    INR 3.27 (H) 09/27/2022    PROTIME 33.5 (H) 10/26/2022    PROTIME 37.8 (H) 10/19/2022    PROTIME 34.1 (H) 09/27/2022     DX:Hx of mitral valve replacement with mechanical valve   Range:2.5-3.5  Dose:10/9/9  [facility]

## 2022-11-14 ENCOUNTER — OFFICE VISIT (OUTPATIENT)
Dept: FAMILY MEDICINE CLINIC | Facility: CLINIC | Age: 52
End: 2022-11-14

## 2022-11-14 ENCOUNTER — LAB (OUTPATIENT)
Dept: LAB | Facility: HOSPITAL | Age: 52
End: 2022-11-14

## 2022-11-14 VITALS
OXYGEN SATURATION: 98 % | HEIGHT: 70 IN | WEIGHT: 227.8 LBS | HEART RATE: 92 BPM | DIASTOLIC BLOOD PRESSURE: 76 MMHG | TEMPERATURE: 97.4 F | BODY MASS INDEX: 32.61 KG/M2 | SYSTOLIC BLOOD PRESSURE: 132 MMHG

## 2022-11-14 DIAGNOSIS — Z95.2 HX OF MITRAL VALVE REPLACEMENT WITH MECHANICAL VALVE: ICD-10-CM

## 2022-11-14 DIAGNOSIS — E11.9 TYPE 2 DIABETES MELLITUS WITHOUT COMPLICATION, WITHOUT LONG-TERM CURRENT USE OF INSULIN: Primary | ICD-10-CM

## 2022-11-14 DIAGNOSIS — L98.9 LESION OF SKIN OF NOSE: ICD-10-CM

## 2022-11-14 DIAGNOSIS — E11.9 TYPE 2 DIABETES MELLITUS WITHOUT COMPLICATION, WITHOUT LONG-TERM CURRENT USE OF INSULIN: ICD-10-CM

## 2022-11-14 DIAGNOSIS — D22.9 NEVUS: ICD-10-CM

## 2022-11-14 LAB
BASOPHILS # BLD AUTO: 0.02 10*3/MM3 (ref 0–0.2)
BASOPHILS NFR BLD AUTO: 0.5 % (ref 0–1.5)
DEPRECATED RDW RBC AUTO: 35.2 FL (ref 37–54)
EOSINOPHIL # BLD AUTO: 0.1 10*3/MM3 (ref 0–0.4)
EOSINOPHIL NFR BLD AUTO: 2.3 % (ref 0.3–6.2)
ERYTHROCYTE [DISTWIDTH] IN BLOOD BY AUTOMATED COUNT: 13.3 % (ref 12.3–15.4)
HCT VFR BLD AUTO: 44.7 % (ref 37.5–51)
HGB BLD-MCNC: 13.7 G/DL (ref 13–17.7)
IMM GRANULOCYTES # BLD AUTO: 0.02 10*3/MM3 (ref 0–0.05)
IMM GRANULOCYTES NFR BLD AUTO: 0.5 % (ref 0–0.5)
INR PPP: 3.78 (ref 0.86–1.15)
LYMPHOCYTES # BLD AUTO: 1.78 10*3/MM3 (ref 0.7–3.1)
LYMPHOCYTES NFR BLD AUTO: 40.8 % (ref 19.6–45.3)
MCH RBC QN AUTO: 23.2 PG (ref 26.6–33)
MCHC RBC AUTO-ENTMCNC: 30.6 G/DL (ref 31.5–35.7)
MCV RBC AUTO: 75.8 FL (ref 79–97)
MONOCYTES # BLD AUTO: 0.55 10*3/MM3 (ref 0.1–0.9)
MONOCYTES NFR BLD AUTO: 12.6 % (ref 5–12)
NEUTROPHILS NFR BLD AUTO: 1.89 10*3/MM3 (ref 1.7–7)
NEUTROPHILS NFR BLD AUTO: 43.3 % (ref 42.7–76)
NRBC BLD AUTO-RTO: 0 /100 WBC (ref 0–0.2)
PLATELET # BLD AUTO: 270 10*3/MM3 (ref 140–450)
PMV BLD AUTO: 10.2 FL (ref 6–12)
PROTHROMBIN TIME: 38.2 SECONDS (ref 11.8–14.9)
RBC # BLD AUTO: 5.9 10*6/MM3 (ref 4.14–5.8)
WBC NRBC COR # BLD: 4.36 10*3/MM3 (ref 3.4–10.8)

## 2022-11-14 PROCEDURE — 99213 OFFICE O/P EST LOW 20 MIN: CPT | Performed by: NURSE PRACTITIONER

## 2022-11-14 PROCEDURE — 83036 HEMOGLOBIN GLYCOSYLATED A1C: CPT

## 2022-11-14 PROCEDURE — 36415 COLL VENOUS BLD VENIPUNCTURE: CPT

## 2022-11-14 PROCEDURE — 80053 COMPREHEN METABOLIC PANEL: CPT

## 2022-11-14 PROCEDURE — 85025 COMPLETE CBC W/AUTO DIFF WBC: CPT

## 2022-11-14 PROCEDURE — 82043 UR ALBUMIN QUANTITATIVE: CPT

## 2022-11-14 PROCEDURE — 85610 PROTHROMBIN TIME: CPT

## 2022-11-14 PROCEDURE — 80061 LIPID PANEL: CPT

## 2022-11-14 NOTE — PROGRESS NOTES
"Chief Complaint  Diabetes (Follow up )    Subjective         Kevin Clifton presents to [facility]  Presents to the office today for 6-month follow-up for his diabetes, hypertension hyperlipidemia.  I did explain to the patient he is due for lab work.  His A1c's have always remained in the sixes.  He does state that he had to stop taking the Trulicity as it caused constipation.  We will continue his current regimen with the exception of the Trulicity.  Patient does state that he has multiple moles that he would like to have evaluated.  He does state that he has 1 on his nose that has been there for years but then recently started draining.  He also has 1 on his left leg and his back that he would like evaluated.  Due to the lesion being on his face I explained I was going to refer him to dermatology for evaluation.       Objective     Vitals:    11/14/22 1517   BP: 132/76   BP Location: Right arm   Patient Position: Sitting   Cuff Size: Adult   Pulse: 92   Temp: 97.4 °F (36.3 °C)   TempSrc: Temporal   SpO2: 98%   Weight: 103 kg (227 lb 12.8 oz)   Height: 177.8 cm (70\")      Body mass index is 32.69 kg/m².    BMI is >= 30 and <35. (Class 1 Obesity). The following options were offered after discussion;: nutrition counseling/recommendations        Physical Exam  Vitals reviewed.   Constitutional:       Appearance: Normal appearance.   Cardiovascular:      Rate and Rhythm: Normal rate and regular rhythm.      Pulses: Normal pulses.      Heart sounds: Normal heart sounds, S1 normal and S2 normal. No murmur heard.  Pulmonary:      Effort: Pulmonary effort is normal. No respiratory distress.      Breath sounds: Normal breath sounds.   Skin:     General: Skin is warm and dry.      Comments: Round black nevus noted to the nose, round black nevus noted to the left medial lower leg, skin tag noted to right thoracic back   Neurological:      Mental Status: He is alert and oriented to person, " place, and time.   Psychiatric:         Attention and Perception: Attention normal.         Mood and Affect: Mood normal.         Behavior: Behavior normal.          Result Review :   The following data was reviewed by: GABY Lion on 11/14/2022:      Procedures    Assessment and Plan   Diagnoses and all orders for this visit:    1. Type 2 diabetes mellitus without complication, without long-term current use of insulin (HCC) (Primary)  -     CBC & Differential; Future  -     Comprehensive Metabolic Panel; Future  -     Hemoglobin A1c; Future  -     Lipid Panel; Future  -     MicroAlbumin, Urine, Random - Urine, Clean Catch; Future    2. Lesion of skin of nose  -     Ambulatory Referral to Dermatology    3. Nevus  -     Ambulatory Referral to Dermatology          Follow Up   Return in about 6 months (around 5/14/2023) for Recheck.  Patient was given instructions and counseling regarding his condition or for health maintenance advice. Please see specific information pulled into the AVS if appropriate.

## 2022-11-15 ENCOUNTER — TELEPHONE (OUTPATIENT)
Dept: CARDIOLOGY | Facility: CLINIC | Age: 52
End: 2022-11-15

## 2022-11-15 ENCOUNTER — ANTICOAGULATION VISIT (OUTPATIENT)
Dept: CARDIOLOGY | Facility: CLINIC | Age: 52
End: 2022-11-15

## 2022-11-15 DIAGNOSIS — Z95.2 HX OF MITRAL VALVE REPLACEMENT WITH MECHANICAL VALVE: Primary | ICD-10-CM

## 2022-11-15 LAB
ALBUMIN SERPL-MCNC: 4.2 G/DL (ref 3.5–5.2)
ALBUMIN UR-MCNC: 1.8 MG/DL
ALBUMIN/GLOB SERPL: 1.2 G/DL
ALP SERPL-CCNC: 54 U/L (ref 39–117)
ALT SERPL W P-5'-P-CCNC: 38 U/L (ref 1–41)
ANION GAP SERPL CALCULATED.3IONS-SCNC: 9 MMOL/L (ref 5–15)
AST SERPL-CCNC: 36 U/L (ref 1–40)
BILIRUB SERPL-MCNC: 0.6 MG/DL (ref 0–1.2)
BUN SERPL-MCNC: 9 MG/DL (ref 6–20)
BUN/CREAT SERPL: 8.2 (ref 7–25)
CALCIUM SPEC-SCNC: 9.2 MG/DL (ref 8.6–10.5)
CHLORIDE SERPL-SCNC: 104 MMOL/L (ref 98–107)
CHOLEST SERPL-MCNC: 193 MG/DL (ref 0–200)
CO2 SERPL-SCNC: 26 MMOL/L (ref 22–29)
CREAT SERPL-MCNC: 1.1 MG/DL (ref 0.76–1.27)
EGFRCR SERPLBLD CKD-EPI 2021: 80.8 ML/MIN/1.73
GLOBULIN UR ELPH-MCNC: 3.5 GM/DL
GLUCOSE SERPL-MCNC: 95 MG/DL (ref 65–99)
HBA1C MFR BLD: 6.1 % (ref 4.8–5.6)
HDLC SERPL-MCNC: 46 MG/DL (ref 40–60)
LDLC SERPL CALC-MCNC: 83 MG/DL (ref 0–100)
LDLC/HDLC SERPL: 1.49 {RATIO}
POTASSIUM SERPL-SCNC: 4.3 MMOL/L (ref 3.5–5.2)
PROT SERPL-MCNC: 7.7 G/DL (ref 6–8.5)
SODIUM SERPL-SCNC: 139 MMOL/L (ref 136–145)
TRIGL SERPL-MCNC: 393 MG/DL (ref 0–150)
VLDLC SERPL-MCNC: 64 MG/DL (ref 5–40)

## 2022-11-15 NOTE — PROGRESS NOTES
Lab Results   Component Value Date    INR 3.78 (H) 11/14/2022    INR 3.21 (H) 10/26/2022    INR 3.73 (H) 10/19/2022    PROTIME 38.2 (H) 11/14/2022    PROTIME 33.5 (H) 10/26/2022    PROTIME 37.8 (H) 10/19/2022     DX: Hx of mitral valve replacement with mechanical valve  Range:2.5-3.5  Dose:9/9/10  [facility]

## 2022-11-15 NOTE — TELEPHONE ENCOUNTER
----- Message from GABY Perkins sent at 11/15/2022  8:42 AM EST -----        ----- Message -----  From: Saritha Lincoln  Sent: 11/15/2022   8:04 AM EST  To: GABY Perkins

## 2022-11-30 ENCOUNTER — LAB (OUTPATIENT)
Dept: LAB | Facility: HOSPITAL | Age: 52
End: 2022-11-30

## 2022-11-30 DIAGNOSIS — Z95.2 HX OF MITRAL VALVE REPLACEMENT WITH MECHANICAL VALVE: ICD-10-CM

## 2022-11-30 LAB
INR PPP: 2.88 (ref 0.86–1.15)
PROTHROMBIN TIME: 30.8 SECONDS (ref 11.8–14.9)

## 2022-11-30 PROCEDURE — 36415 COLL VENOUS BLD VENIPUNCTURE: CPT

## 2022-11-30 PROCEDURE — 85610 PROTHROMBIN TIME: CPT

## 2022-12-01 ENCOUNTER — ANTICOAGULATION VISIT (OUTPATIENT)
Dept: CARDIOLOGY | Facility: CLINIC | Age: 52
End: 2022-12-01

## 2022-12-01 DIAGNOSIS — Z95.2 HX OF MITRAL VALVE REPLACEMENT WITH MECHANICAL VALVE: Primary | ICD-10-CM

## 2022-12-01 NOTE — PROGRESS NOTES
Lab Results   Component Value Date    INR 2.88 (H) 11/30/2022    INR 3.78 (H) 11/14/2022    INR 3.21 (H) 10/26/2022    PROTIME 30.8 (H) 11/30/2022    PROTIME 38.2 (H) 11/14/2022    PROTIME 33.5 (H) 10/26/2022     DX:Hx of mitral valve replacement with mechanical valve   Range:2.5-3.5  Dose:9  H Lab    Informed pt with results and plan.

## 2022-12-13 DIAGNOSIS — N52.9 ERECTILE DYSFUNCTION, UNSPECIFIED ERECTILE DYSFUNCTION TYPE: ICD-10-CM

## 2022-12-13 RX ORDER — SILDENAFIL 100 MG/1
TABLET, FILM COATED ORAL
Qty: 30 TABLET | Refills: 1 | Status: SHIPPED | OUTPATIENT
Start: 2022-12-13 | End: 2023-03-28

## 2022-12-14 ENCOUNTER — LAB (OUTPATIENT)
Dept: LAB | Facility: HOSPITAL | Age: 52
End: 2022-12-14

## 2022-12-14 DIAGNOSIS — Z95.2 HX OF MITRAL VALVE REPLACEMENT WITH MECHANICAL VALVE: ICD-10-CM

## 2022-12-14 LAB
INR PPP: 2.84 (ref 0.86–1.15)
PROTHROMBIN TIME: 30.5 SECONDS (ref 11.8–14.9)

## 2022-12-14 PROCEDURE — 85610 PROTHROMBIN TIME: CPT

## 2022-12-14 PROCEDURE — 36415 COLL VENOUS BLD VENIPUNCTURE: CPT

## 2022-12-15 ENCOUNTER — ANTICOAGULATION VISIT (OUTPATIENT)
Dept: CARDIOLOGY | Facility: CLINIC | Age: 52
End: 2022-12-15

## 2022-12-15 DIAGNOSIS — Z95.2 HX OF MITRAL VALVE REPLACEMENT WITH MECHANICAL VALVE: Primary | ICD-10-CM

## 2022-12-15 NOTE — PROGRESS NOTES
Lab Results   Component Value Date    INR 2.84 (H) 12/14/2022    INR 2.88 (H) 11/30/2022    INR 3.78 (H) 11/14/2022    PROTIME 30.5 (H) 12/14/2022    PROTIME 30.8 (H) 11/30/2022    PROTIME 38.2 (H) 11/14/2022     Dx: hx of mitral valve replacement with mechanical valve  Pt taking 9 mg   range: 2.5-3.5  [facility]

## 2023-01-04 ENCOUNTER — OFFICE VISIT (OUTPATIENT)
Dept: CARDIOLOGY | Facility: CLINIC | Age: 53
End: 2023-01-04
Payer: OTHER GOVERNMENT

## 2023-01-04 VITALS
SYSTOLIC BLOOD PRESSURE: 145 MMHG | HEART RATE: 72 BPM | DIASTOLIC BLOOD PRESSURE: 86 MMHG | HEIGHT: 70 IN | WEIGHT: 233 LBS | BODY MASS INDEX: 33.36 KG/M2

## 2023-01-04 DIAGNOSIS — I42.1 HOCM (HYPERTROPHIC OBSTRUCTIVE CARDIOMYOPATHY): ICD-10-CM

## 2023-01-04 DIAGNOSIS — I48.0 PAROXYSMAL ATRIAL FIBRILLATION: ICD-10-CM

## 2023-01-04 DIAGNOSIS — I10 PRIMARY HYPERTENSION: ICD-10-CM

## 2023-01-04 DIAGNOSIS — Z95.2 HX OF MITRAL VALVE REPLACEMENT WITH MECHANICAL VALVE: Primary | ICD-10-CM

## 2023-01-04 PROCEDURE — 93000 ELECTROCARDIOGRAM COMPLETE: CPT | Performed by: INTERNAL MEDICINE

## 2023-01-04 PROCEDURE — 99214 OFFICE O/P EST MOD 30 MIN: CPT | Performed by: INTERNAL MEDICINE

## 2023-01-04 PROCEDURE — 1159F MED LIST DOCD IN RCRD: CPT | Performed by: INTERNAL MEDICINE

## 2023-01-04 PROCEDURE — 1160F RVW MEDS BY RX/DR IN RCRD: CPT | Performed by: INTERNAL MEDICINE

## 2023-01-04 NOTE — PROGRESS NOTES
Chief Complaint  Cardiomyopathy, Hypertension, Hyperlipidemia, and Follow-up    Subjective    Patient is doing well at home does not report any changes breathing capacity.  He is exercising regularly attempting to alternate running quarter-mile with jogging.  Denies any syncope or presyncopal episodes    Past Medical History:   Diagnosis Date   • Abdominal bloating    • Abnormal ECG    • Anemia    • Arrhythmia    • Asthma    • Atrial fibrillation (HCC)    • Carr's esophagus    • Benign essential hypertension    • CHF (congestive heart failure) (HCC)    • DDD (degenerative disc disease), cervical    • Depression    • Diabetes (HCC)    • Diabetes mellitus type 2, noninsulin dependent (HCC)    • Elevated liver enzymes    • GERD (gastroesophageal reflux disease)    • Heart murmur    • Heart valve disease    • Heartburn    • High cholesterol    • HLD (hyperlipidemia)    • HTN (hypertension)    • Hypertrophic obstructive cardiomyopathy 11/28/2021   • LLQ abdominal pain    • Nosebleed    • Paroxysmal atrial fibrillation 5/31/2022   • Sleep apnea          Current Outpatient Medications:   •  aspirin 81 MG EC tablet, Daily., Disp: , Rfl:   •  atorvastatin (LIPITOR) 10 MG tablet, Every Night., Disp: , Rfl:   •  buPROPion SR (WELLBUTRIN SR) 200 MG 12 hr tablet, Take 200 mg by mouth 2 (Two) Times a Day., Disp: , Rfl:   •  carvedilol (COREG) 25 MG tablet, carvedilol 25 mg oral tablet take 2 tablets by oral route 2 times a day   Suspended, Disp: , Rfl:   •  cetirizine (zyrTEC) 10 MG tablet, Take 10 mg by mouth Daily., Disp: , Rfl:   •  Cholecalciferol 25 MCG (1000 UT) capsule, Vitamin D3 1,000 unit oral capsule take 1 capsule by oral route daily   Active, Disp: , Rfl:   •  lisinopril (PRINIVIL,ZESTRIL) 10 MG tablet, Take 1 tablet by mouth Daily., Disp: 90 tablet, Rfl: 3  •  melatonin 3 MG tablet, melatonin 3 mg oral tablet take 1 tablet by oral route once a day (at bedtime)   Suspended, Disp: , Rfl:   •  metFORMIN (GLUCOPHAGE)  500 MG tablet, Take 500 mg by mouth 2 (Two) Times a Day With Meals., Disp: , Rfl:   •  montelukast (SINGULAIR) 10 MG tablet, Singulair 10 mg oral tablet take 1 tablet (10 mg) by oral route once daily in the evening   Suspended, Disp: , Rfl:   •  pantoprazole (PROTONIX) 20 MG EC tablet, pantoprazole 20 mg tablet,delayed release, Disp: , Rfl:   •  sildenafil (VIAGRA) 100 MG tablet, TAKE ONE TABLET BY MOUTH AS NEEDED FOR ERECTILE DYSFUNCTION, Disp: 30 tablet, Rfl: 1  •  warfarin (COUMADIN) 3 MG tablet, TAKE ONE TABLET DAILY OR AS DIRECTED, Disp: 90 tablet, Rfl: 3  •  warfarin (COUMADIN) 4 MG tablet, TAKE ONE TABLET DAILY OR AS DIRECTED, Disp: 90 tablet, Rfl: 3  •  warfarin (COUMADIN) 5 MG tablet, TAKE ONE TABLET DAILY OR AS DIRECTED, Disp: 90 tablet, Rfl: 3  •  zolpidem (AMBIEN) 5 MG tablet, TAKE ONE TABLET BY MOUTH EVERY NIGHT AT BEDTIME AS NEEDED FOR SLEEP, Disp: 90 tablet, Rfl: 1    There are no discontinued medications.  No Known Allergies     Social History     Tobacco Use   • Smoking status: Never   • Smokeless tobacco: Never   Vaping Use   • Vaping Use: Never used   Substance Use Topics   • Alcohol use: Yes     Comment: rarely   • Drug use: Never       Family History   Problem Relation Age of Onset   • Diabetes Mother    • No Known Problems Father         Objective     /86   Pulse 72   Ht 177.8 cm (70\")   Wt 106 kg (233 lb)   BMI 33.43 kg/m²       Physical Exam    General Appearance:   · no acute distress  · Alert and oriented x3  HENT:   · lips not cyanotic  · Atraumatic  Neck:  · No jvd   · supple  Respiratory:  · no respiratory distress  · normal breath sounds  · no rales  Cardiovascular:  · Regular rate and rhythm  · no S3, no S4   · 1/6 systolic murmur normal mechanical heart sounds  · no rub  Extremities  · No cyanosis  · lower extremity edema: none    Skin:   · warm, dry  · No rashes      Result Review :     No results found for: PROBNP  CMP    CMP 6/15/22 11/14/22   Glucose 84 95   BUN 11 9    Creatinine 1.14 1.10   eGFR 77.9 80.8   Sodium 138 139   Potassium 4.1 4.3   Chloride 107 104   Calcium 9.0 9.2   Total Protein 6.9 7.7   Albumin 4.20 4.20   Globulin 2.7 3.5   Total Bilirubin 0.6 0.6   Alkaline Phosphatase 44 54   AST (SGOT) 28 36   ALT (SGPT) 28 38   Albumin/Globulin Ratio 1.6 1.2   BUN/Creatinine Ratio 9.6 8.2   Anion Gap 6.7 9.0      Comments are available for some flowsheets but are not being displayed.           CBC w/diff    CBC w/Diff 6/15/22 11/14/22   WBC 4.22 4.36   RBC 5.71 5.90 (A)   Hemoglobin 13.1 13.7   Hematocrit 43.3 44.7   MCV 75.8 (A) 75.8 (A)   MCH 22.9 (A) 23.2 (A)   MCHC 30.3 (A) 30.6 (A)   RDW 13.9 13.3   Platelets 279 270   Neutrophil Rel % 44.9 43.3   Immature Granulocyte Rel % 0.2 0.5   Lymphocyte Rel % 37.4 40.8   Monocyte Rel % 14.9 (A) 12.6 (A)   Eosinophil Rel % 2.1 2.3   Basophil Rel % 0.5 0.5   (A) Abnormal value             Lab Results   Component Value Date    TSH 1.070 02/11/2019      Lab Results   Component Value Date    FREET4 1.6 02/11/2019      No results found for: DDIMERQUANT  No results found for: MG   No results found for: DIGOXIN   Lab Results   Component Value Date    TROPONINT <0.01 02/07/2019           Lipid Panel    Lipid Panel 6/15/22 11/14/22   Total Cholesterol 196 193   Triglycerides 119 393 (A)   HDL Cholesterol 46 46   VLDL Cholesterol 21 64 (A)   LDL Cholesterol  129 (A) 83   LDL/HDL Ratio 2.74 1.49   (A) Abnormal value            No results found for: POCTROP    Results for orders placed in visit on 07/16/22    Adult Transthoracic Echo Complete W/ Cont if Necessary Per Protocol    Interpretation Summary  · Calculated left ventricular EF = 64% Estimated left ventricular EF was in agreement with the calculated left ventricular EF.  · Left ventricular wall thickness is consistent with moderate concentric hypertrophy.  · Left atrial dilation moderate  · Left ventricular diastolic function is consistent with (grade Ia w/high LAP) impaired  relaxation.  · There is a bioprosthetic mitral valve present.  · Evidence of increased LV filling pressures       ECG 12 Lead    Date/Time: 1/4/2023 4:02 PM  Performed by: Jaden Kern MD  Authorized by: Jaden Kern MD   Comparison: compared with previous ECG   Similar to previous ECG  Rhythm: sinus rhythm  Conduction: left bundle branch block                   Diagnoses and all orders for this visit:    1. Hx of mitral valve replacement with mechanical valve (Primary)  Assessment & Plan:  Patient is on Coumadin goal INR between 2.5 and 3.5      2. Hypertrophic obstructive cardiomyopathy  Assessment & Plan:  Symptomatically stable patient is exercising currently has not had any presyncopal episodes continue with Coreg 25 mg twice daily dosing and lisinopril 10 once a day      3. Paroxysmal atrial fibrillation  Assessment & Plan:  Patient primarily maintained normal sinus rhythm watch states in A. fib less than 2% of time not symptomatically bothered with recurrent episodes continue on Coumadin for CVA prevention  Counseled patient for avoidance of atrial fibrillation to  · Avoid alcohol consumption  · Aerobic activity 30 minutes or greater per day 5 times per week  · Weight loss        4. Primary hypertension  Assessment & Plan:  At home patient is at goal level on his lisinopril 10 once a day and Coreg 25 twice daily dosing.            Follow Up     Return in about 6 months (around 7/4/2023).          Patient was given instructions and counseling regarding his condition or for health maintenance advice. Please see specific information pulled into the AVS if appropriate.

## 2023-01-04 NOTE — ASSESSMENT & PLAN NOTE
At home patient is at goal level on his lisinopril 10 once a day and Coreg 25 twice daily dosing.

## 2023-01-04 NOTE — ASSESSMENT & PLAN NOTE
Symptomatically stable patient is exercising currently has not had any presyncopal episodes continue with Coreg 25 mg twice daily dosing and lisinopril 10 once a day

## 2023-01-04 NOTE — ASSESSMENT & PLAN NOTE
Patient primarily maintained normal sinus rhythm watch states in A. fib less than 2% of time not symptomatically bothered with recurrent episodes continue on Coumadin for CVA prevention  Counseled patient for avoidance of atrial fibrillation to  · Avoid alcohol consumption  · Aerobic activity 30 minutes or greater per day 5 times per week  · Weight loss

## 2023-01-20 ENCOUNTER — LAB (OUTPATIENT)
Dept: LAB | Facility: HOSPITAL | Age: 53
End: 2023-01-20
Payer: OTHER GOVERNMENT

## 2023-01-20 DIAGNOSIS — Z95.2 HX OF MITRAL VALVE REPLACEMENT WITH MECHANICAL VALVE: ICD-10-CM

## 2023-01-20 LAB
INR PPP: 2.94 (ref 0.86–1.15)
PROTHROMBIN TIME: 31.3 SECONDS (ref 11.8–14.9)

## 2023-01-20 PROCEDURE — 85610 PROTHROMBIN TIME: CPT

## 2023-01-20 PROCEDURE — 36415 COLL VENOUS BLD VENIPUNCTURE: CPT

## 2023-01-23 ENCOUNTER — ANTICOAGULATION VISIT (OUTPATIENT)
Dept: CARDIOLOGY | Facility: CLINIC | Age: 53
End: 2023-01-23
Payer: OTHER GOVERNMENT

## 2023-01-23 DIAGNOSIS — Z95.2 HX OF MITRAL VALVE REPLACEMENT WITH MECHANICAL VALVE: Primary | ICD-10-CM

## 2023-01-23 LAB — INR PPP: 2.94 (ref 2.5–3.5)

## 2023-01-23 NOTE — PROGRESS NOTES
Lab Results   Component Value Date    INR 2.94 (H) 01/20/2023    INR 2.84 (H) 12/14/2022    INR 2.88 (H) 11/30/2022    PROTIME 31.3 (H) 01/20/2023    PROTIME 30.5 (H) 12/14/2022    PROTIME 30.8 (H) 11/30/2022     Dx: hx of mitral valve replacement mechanical valve  Pt taking 9 mg  Range: 2.5-3.5  [facility]

## 2023-01-24 ENCOUNTER — ANTICOAGULATION VISIT (OUTPATIENT)
Dept: CARDIOLOGY | Facility: CLINIC | Age: 53
End: 2023-01-24
Payer: OTHER GOVERNMENT

## 2023-01-24 DIAGNOSIS — Z95.2 HX OF MITRAL VALVE REPLACEMENT WITH MECHANICAL VALVE: Primary | ICD-10-CM

## 2023-01-24 NOTE — PROGRESS NOTES
Lab Results   Component Value Date    INR 2.94 01/23/2023    INR 2.94 (H) 01/20/2023    INR 2.84 (H) 12/14/2022    PROTIME 31.3 (H) 01/20/2023    PROTIME 30.5 (H) 12/14/2022    PROTIME 30.8 (H) 11/30/2022   Hx of mitral valve replacement with mechanical valve  Range 2.5-3.5  9 mg  [facility] lab  Pt is aware of results and instructions.

## 2023-01-26 ENCOUNTER — TELEMEDICINE (OUTPATIENT)
Dept: FAMILY MEDICINE CLINIC | Facility: CLINIC | Age: 53
End: 2023-01-26
Payer: OTHER GOVERNMENT

## 2023-01-26 DIAGNOSIS — J06.9 UPPER RESPIRATORY TRACT INFECTION, UNSPECIFIED TYPE: Primary | ICD-10-CM

## 2023-01-26 PROCEDURE — 99213 OFFICE O/P EST LOW 20 MIN: CPT | Performed by: NURSE PRACTITIONER

## 2023-01-26 RX ORDER — AMOXICILLIN 500 MG/1
500 CAPSULE ORAL 3 TIMES DAILY
Qty: 30 CAPSULE | Refills: 0 | Status: SHIPPED | OUTPATIENT
Start: 2023-01-26 | End: 2023-03-20

## 2023-01-26 NOTE — PROGRESS NOTES
Chief Complaint  Cough (Covid home test negative sunday), URI (Yellow phlem), and Nasal Congestion    Subjective        Kevin Clifton presents to [facility]  History of Present Illness  Having cough cold that started Sunday. Tested at [facility] for covid and was negative. and has been negative.  Denies fever last week.  100.7   Sore throat last week.   Has a heart valve so is concerned about infection.  Cough  Associated symptoms include a fever and a sore throat. Pertinent negatives include no chest pain or shortness of breath.   URI   Associated symptoms include congestion, coughing and a sore throat. Pertinent negatives include no chest pain.         Past History:    Medical History: has a past medical history of Abdominal bloating, Abnormal ECG, Anemia, Arrhythmia, Asthma, Atrial fibrillation (HCC), Carr's esophagus, Benign essential hypertension, CHF (congestive heart failure) ([facility]), DDD (degenerative disc disease), cervical, Depression, Diabetes (HCC), Diabetes mellitus type 2, noninsulin dependent (HCC), Elevated liver enzymes, GERD (gastroesophageal reflux disease), Heart murmur, Heart valve disease, Heartburn, High cholesterol, HLD (hyperlipidemia), HTN (hypertension), Hypertrophic obstructive cardiomyopathy (11/28/2021), LLQ abdominal pain, Nosebleed, Paroxysmal atrial fibrillation (5/31/2022), and Sleep apnea.     Surgical History: has a past surgical history that includes Colonoscopy (01/11/2019); Esophagogastroduodenoscopy (01/11/2019); Cardiac surgery (2019); Mitral valve replacement; Ablation of dysrhythmic focus; and Cardiac catheterization.     Family History: family history includes Diabetes in his mother; No Known Problems in his father.     Social History: reports that he has never smoked. He has never used smokeless tobacco. He reports current alcohol use. He reports that he does not use drugs.    Allergies: Patient has no known allergies.          Current  Outpatient Medications:   •  buPROPion SR (WELLBUTRIN SR) 150 MG 12 hr tablet, Take 300 mg by mouth 2 (Two) Times a Day., Disp: , Rfl:   •  carvedilol (COREG) 25 MG tablet, Take 25 mg by mouth 2 (Two) Times a Day With Meals., Disp: , Rfl:   •  amoxicillin (AMOXIL) 500 MG capsule, Take 1 capsule by mouth 3 (Three) Times a Day., Disp: 30 capsule, Rfl: 0  •  aspirin 81 MG EC tablet, Daily., Disp: , Rfl:   •  atorvastatin (LIPITOR) 10 MG tablet, Every Night., Disp: , Rfl:   •  cetirizine (zyrTEC) 10 MG tablet, Take 10 mg by mouth Daily., Disp: , Rfl:   •  Cholecalciferol 25 MCG (1000 UT) capsule, Vitamin D3 1,000 unit oral capsule take 1 capsule by oral route daily   Active, Disp: , Rfl:   •  lisinopril (PRINIVIL,ZESTRIL) 10 MG tablet, Take 1 tablet by mouth Daily., Disp: 90 tablet, Rfl: 3  •  melatonin 3 MG tablet, melatonin 3 mg oral tablet take 1 tablet by oral route once a day (at bedtime)   Suspended, Disp: , Rfl:   •  metFORMIN (GLUCOPHAGE) 500 MG tablet, Take 500 mg by mouth 2 (Two) Times a Day With Meals., Disp: , Rfl:   •  montelukast (SINGULAIR) 10 MG tablet, Singulair 10 mg oral tablet take 1 tablet (10 mg) by oral route once daily in the evening   Suspended, Disp: , Rfl:   •  pantoprazole (PROTONIX) 20 MG EC tablet, pantoprazole 20 mg tablet,delayed release, Disp: , Rfl:   •  sildenafil (VIAGRA) 100 MG tablet, TAKE ONE TABLET BY MOUTH AS NEEDED FOR ERECTILE DYSFUNCTION, Disp: 30 tablet, Rfl: 1  •  warfarin (COUMADIN) 3 MG tablet, TAKE ONE TABLET DAILY OR AS DIRECTED, Disp: 90 tablet, Rfl: 3  •  warfarin (COUMADIN) 4 MG tablet, TAKE ONE TABLET DAILY OR AS DIRECTED, Disp: 90 tablet, Rfl: 3  •  warfarin (COUMADIN) 5 MG tablet, TAKE ONE TABLET DAILY OR AS DIRECTED, Disp: 90 tablet, Rfl: 3  •  zolpidem (AMBIEN) 5 MG tablet, TAKE ONE TABLET BY MOUTH EVERY NIGHT AT BEDTIME AS NEEDED FOR SLEEP, Disp: 90 tablet, Rfl: 1    There are no discontinued medications.      Review of Systems   Constitutional: Positive for  fever.   HENT: Positive for congestion and sore throat.    Respiratory: Positive for cough. Negative for shortness of breath.    Cardiovascular: Negative for chest pain, palpitations and leg swelling.   Neurological: Negative for dizziness, weakness, numbness and headache.        Objective         There were no vitals filed for this visit.  There is no height or weight on file to calculate BMI.         Physical Exam  Constitutional:       Appearance: He is not ill-appearing.   Pulmonary:      Effort: Pulmonary effort is normal.   Neurological:      Mental Status: He is alert and oriented to person, place, and time.   Psychiatric:         Mood and Affect: Mood normal.         Behavior: Behavior normal.         Thought Content: Thought content normal.         Judgment: Judgment normal.             Result Review :               Assessment and Plan     Diagnoses and all orders for this visit:    1. Upper respiratory tract infection, unspecified type (Primary)  -     amoxicillin (AMOXIL) 500 MG capsule; Take 1 capsule by mouth 3 (Three) Times a Day.  Dispense: 30 capsule; Refill: 0        Doximity video visit  with patients consent lasting  15   minutes from [facility] [place] office with patient at home.      Follow Up     Return if symptoms worsen or fail to improve, for Next scheduled follow up.    Patient was given instructions and counseling regarding his condition or for health maintenance advice. Please see specific information pulled into the AVS if appropriate.

## 2023-02-21 ENCOUNTER — LAB (OUTPATIENT)
Dept: LAB | Facility: HOSPITAL | Age: 53
End: 2023-02-21
Payer: OTHER GOVERNMENT

## 2023-02-21 DIAGNOSIS — Z95.2 HX OF MITRAL VALVE REPLACEMENT WITH MECHANICAL VALVE: ICD-10-CM

## 2023-02-21 LAB
INR PPP: 3.58 (ref 0.86–1.15)
PROTHROMBIN TIME: 36.6 SECONDS (ref 11.8–14.9)

## 2023-02-21 PROCEDURE — 36415 COLL VENOUS BLD VENIPUNCTURE: CPT

## 2023-02-21 PROCEDURE — 85610 PROTHROMBIN TIME: CPT

## 2023-02-22 ENCOUNTER — ANTICOAGULATION VISIT (OUTPATIENT)
Dept: CARDIOLOGY | Facility: CLINIC | Age: 53
End: 2023-02-22
Payer: OTHER GOVERNMENT

## 2023-02-22 DIAGNOSIS — Z95.2 HX OF MITRAL VALVE REPLACEMENT WITH MECHANICAL VALVE: Primary | ICD-10-CM

## 2023-02-22 RX ORDER — WARFARIN SODIUM 4 MG/1
TABLET ORAL
Qty: 90 TABLET | Refills: 3 | Status: SHIPPED | OUTPATIENT
Start: 2023-02-22

## 2023-02-22 RX ORDER — WARFARIN SODIUM 3 MG/1
TABLET ORAL
Qty: 90 TABLET | Refills: 3 | Status: SHIPPED | OUTPATIENT
Start: 2023-02-22

## 2023-02-22 RX ORDER — WARFARIN SODIUM 5 MG/1
TABLET ORAL
Qty: 90 TABLET | Refills: 3 | Status: SHIPPED | OUTPATIENT
Start: 2023-02-22

## 2023-02-22 NOTE — PROGRESS NOTES
Lab Results   Component Value Date    INR 3.58 (H) 02/21/2023    INR 2.94 01/23/2023    INR 2.94 (H) 01/20/2023    PROTIME 36.6 (H) 02/21/2023    PROTIME 31.3 (H) 01/20/2023    PROTIME 30.5 (H) 12/14/2022     Dx: hx of mitral valve replacement with mechanical valve  Pt taking 9 mg  Range: 2.5-3.5  [facility]

## 2023-03-20 ENCOUNTER — TELEPHONE (OUTPATIENT)
Dept: FAMILY MEDICINE CLINIC | Facility: CLINIC | Age: 53
End: 2023-03-20
Payer: OTHER GOVERNMENT

## 2023-03-20 DIAGNOSIS — R05.1 ACUTE COUGH: Primary | ICD-10-CM

## 2023-03-20 RX ORDER — DULAGLUTIDE 1.5 MG/.5ML
INJECTION, SOLUTION SUBCUTANEOUS
COMMUNITY
Start: 2023-03-03

## 2023-03-20 NOTE — TELEPHONE ENCOUNTER
PT CALLED SAYING THAT HIS CHEST HURTS WHEN COUGHING. NOT HIS HEART. PT HAS AN APT WITH [facility]  TOMORROW AT 7:15 AM. PT WANTING TO KNOW IF [facility] COULD PUT AN ORDER IN FOR AN X-RAY SO THAT [facility] WOULD HAVE THIS IN THE MORNING.

## 2023-03-20 NOTE — TELEPHONE ENCOUNTER
Called and left message for Kevin that per Rickey Order for chest x-ray has been placed. Pt may get this completed anytime before closing today.

## 2023-03-21 ENCOUNTER — OFFICE VISIT (OUTPATIENT)
Dept: FAMILY MEDICINE CLINIC | Facility: CLINIC | Age: 53
End: 2023-03-21
Payer: OTHER GOVERNMENT

## 2023-03-21 ENCOUNTER — LAB (OUTPATIENT)
Dept: LAB | Facility: HOSPITAL | Age: 53
End: 2023-03-21
Payer: OTHER GOVERNMENT

## 2023-03-21 ENCOUNTER — ANTICOAGULATION VISIT (OUTPATIENT)
Dept: CARDIOLOGY | Facility: CLINIC | Age: 53
End: 2023-03-21
Payer: OTHER GOVERNMENT

## 2023-03-21 ENCOUNTER — HOSPITAL ENCOUNTER (OUTPATIENT)
Dept: GENERAL RADIOLOGY | Facility: HOSPITAL | Age: 53
Discharge: HOME | End: 2023-03-21
Payer: OTHER GOVERNMENT

## 2023-03-21 VITALS
WEIGHT: 233.2 LBS | OXYGEN SATURATION: 99 % | SYSTOLIC BLOOD PRESSURE: 126 MMHG | DIASTOLIC BLOOD PRESSURE: 80 MMHG | HEIGHT: 70 IN | BODY MASS INDEX: 33.39 KG/M2 | HEART RATE: 85 BPM | TEMPERATURE: 97.3 F

## 2023-03-21 DIAGNOSIS — Z95.2 HX OF MITRAL VALVE REPLACEMENT WITH MECHANICAL VALVE: Primary | ICD-10-CM

## 2023-03-21 DIAGNOSIS — R05.1 ACUTE COUGH: ICD-10-CM

## 2023-03-21 DIAGNOSIS — I10 PRIMARY HYPERTENSION: ICD-10-CM

## 2023-03-21 DIAGNOSIS — J06.9 UPPER RESPIRATORY TRACT INFECTION, UNSPECIFIED TYPE: ICD-10-CM

## 2023-03-21 DIAGNOSIS — E78.5 HYPERLIPIDEMIA, UNSPECIFIED HYPERLIPIDEMIA TYPE: ICD-10-CM

## 2023-03-21 DIAGNOSIS — Z95.2 HX OF MITRAL VALVE REPLACEMENT WITH MECHANICAL VALVE: ICD-10-CM

## 2023-03-21 DIAGNOSIS — E11.9 TYPE 2 DIABETES MELLITUS WITHOUT COMPLICATION, WITHOUT LONG-TERM CURRENT USE OF INSULIN: ICD-10-CM

## 2023-03-21 DIAGNOSIS — E11.9 TYPE 2 DIABETES MELLITUS WITHOUT COMPLICATION, WITHOUT LONG-TERM CURRENT USE OF INSULIN: Primary | ICD-10-CM

## 2023-03-21 LAB
ALBUMIN SERPL-MCNC: 4.2 G/DL (ref 3.5–5.2)
ALBUMIN UR-MCNC: 1.4 MG/DL
ALBUMIN/GLOB SERPL: 1.3 G/DL
ALP SERPL-CCNC: 47 U/L (ref 39–117)
ALT SERPL W P-5'-P-CCNC: 24 U/L (ref 1–41)
ANION GAP SERPL CALCULATED.3IONS-SCNC: 10 MMOL/L (ref 5–15)
AST SERPL-CCNC: 24 U/L (ref 1–40)
BILIRUB SERPL-MCNC: 0.5 MG/DL (ref 0–1.2)
BUN SERPL-MCNC: 6 MG/DL (ref 6–20)
BUN/CREAT SERPL: 5.3 (ref 7–25)
CALCIUM SPEC-SCNC: 9.3 MG/DL (ref 8.6–10.5)
CHLORIDE SERPL-SCNC: 101 MMOL/L (ref 98–107)
CHOLEST SERPL-MCNC: 191 MG/DL (ref 0–200)
CO2 SERPL-SCNC: 25 MMOL/L (ref 22–29)
CREAT SERPL-MCNC: 1.13 MG/DL (ref 0.76–1.27)
DEPRECATED RDW RBC AUTO: 35.6 FL (ref 37–54)
EGFRCR SERPLBLD CKD-EPI 2021: 78.2 ML/MIN/1.73
ERYTHROCYTE [DISTWIDTH] IN BLOOD BY AUTOMATED COUNT: 13.7 % (ref 12.3–15.4)
GLOBULIN UR ELPH-MCNC: 3.2 GM/DL
GLUCOSE SERPL-MCNC: 111 MG/DL (ref 65–99)
HBA1C MFR BLD: 6.3 % (ref 4.8–5.6)
HCT VFR BLD AUTO: 42.3 % (ref 37.5–51)
HDLC SERPL-MCNC: 43 MG/DL (ref 40–60)
HGB BLD-MCNC: 12.8 G/DL (ref 13–17.7)
INR PPP: 3.52 (ref 0.86–1.15)
LDLC SERPL CALC-MCNC: 133 MG/DL (ref 0–100)
LDLC/HDLC SERPL: 3.07 {RATIO}
MCH RBC QN AUTO: 22.3 PG (ref 26.6–33)
MCHC RBC AUTO-ENTMCNC: 30.3 G/DL (ref 31.5–35.7)
MCV RBC AUTO: 73.7 FL (ref 79–97)
PLATELET # BLD AUTO: 287 10*3/MM3 (ref 140–450)
PMV BLD AUTO: 10.8 FL (ref 6–12)
POTASSIUM SERPL-SCNC: 4.3 MMOL/L (ref 3.5–5.2)
PROT SERPL-MCNC: 7.4 G/DL (ref 6–8.5)
PROTHROMBIN TIME: 34.5 SECONDS (ref 11.8–14.9)
RBC # BLD AUTO: 5.74 10*6/MM3 (ref 4.14–5.8)
SODIUM SERPL-SCNC: 136 MMOL/L (ref 136–145)
TRIGL SERPL-MCNC: 80 MG/DL (ref 0–150)
VLDLC SERPL-MCNC: 15 MG/DL (ref 5–40)
WBC NRBC COR # BLD: 4.56 10*3/MM3 (ref 3.4–10.8)

## 2023-03-21 PROCEDURE — 80061 LIPID PANEL: CPT

## 2023-03-21 PROCEDURE — 36415 COLL VENOUS BLD VENIPUNCTURE: CPT

## 2023-03-21 PROCEDURE — 85610 PROTHROMBIN TIME: CPT

## 2023-03-21 PROCEDURE — 80053 COMPREHEN METABOLIC PANEL: CPT

## 2023-03-21 PROCEDURE — 82043 UR ALBUMIN QUANTITATIVE: CPT

## 2023-03-21 PROCEDURE — 83036 HEMOGLOBIN GLYCOSYLATED A1C: CPT

## 2023-03-21 PROCEDURE — 85027 COMPLETE CBC AUTOMATED: CPT

## 2023-03-21 PROCEDURE — 71046 X-RAY EXAM CHEST 2 VIEWS: CPT

## 2023-03-21 RX ORDER — METHYLPREDNISOLONE 4 MG/1
TABLET ORAL
Qty: 21 TABLET | Refills: 0 | Status: SHIPPED | OUTPATIENT
Start: 2023-03-21

## 2023-03-21 RX ORDER — AZITHROMYCIN 250 MG/1
TABLET, FILM COATED ORAL
Qty: 6 TABLET | Refills: 0 | Status: SHIPPED | OUTPATIENT
Start: 2023-03-21

## 2023-03-21 NOTE — PROGRESS NOTES
Informed pt of results and plan on inr pt stated he was already taking 8/9/9 mg.spoke to sean hinds so it was change to 8/9 per sean.pt was called and informed of new plan.

## 2023-03-21 NOTE — PROGRESS NOTES
Lab Results   Component Value Date    INR 3.52 (H) 03/21/2023    INR 3.58 (H) 02/21/2023    INR 2.94 01/23/2023    PROTIME 34.5 (H) 03/21/2023    PROTIME 36.6 (H) 02/21/2023    PROTIME 31.3 (H) 01/20/2023     Dx:mitral valve replacement with mechanical valve   Dose:9mg  Range:2.5-3.5  Location:[facility]

## 2023-03-21 NOTE — PROGRESS NOTES
"Chief Complaint  Cough (Cough causing chest discomfort for the last 2 weeks. )    Subjective         Kevin Clifton presents to [facility]  Presents to the office today with complaints of a productive cough its been present for over 2 weeks.  He also has complaints of sinus congestion and drainage.  Patient also states he has had headaches.  He denies any nausea vomiting diarrhea.  He denies any fevers or chills.  Pressure is 126/80.  He denies any shortness of breath or palpitations.  He does state that he has pains in his chest when he is coughing.  Any radiation of the pain.  Patient also states that he discontinued his Trulicity secondary to it causing constipation and acid reflux.    Cough  This is a recurrent problem. The current episode started in the past 7 days. The problem has been gradually worsening. The problem occurs every few minutes. The cough is productive of brown sputum. Associated symptoms include chest pain, headaches, nasal congestion, postnasal drip, rhinorrhea and a sore throat. Nothing aggravates the symptoms.        Objective     Vitals:    03/21/23 0714   BP: 126/80   BP Location: Right arm   Patient Position: Sitting   Cuff Size: Adult   Pulse: 85   Temp: 97.3 °F (36.3 °C)   TempSrc: Temporal   SpO2: 99%   Weight: 106 kg (233 lb 3.2 oz)   Height: 177.8 cm (70\")      Body mass index is 33.46 kg/m².    BMI is >= 30 and <35. (Class 1 Obesity). The following options were offered after discussion;: nutrition counseling/recommendations        Physical Exam  Vitals reviewed.   Constitutional:       Appearance: Normal appearance.   HENT:      Nose: Congestion and rhinorrhea present.   Cardiovascular:      Rate and Rhythm: Normal rate and regular rhythm.      Pulses: Normal pulses.      Heart sounds: Normal heart sounds, S1 normal and S2 normal. No murmur heard.  Pulmonary:      Effort: Pulmonary effort is normal. No respiratory distress.      Breath sounds: " Normal breath sounds.   Skin:     General: Skin is warm and dry.   Neurological:      Mental Status: He is alert and oriented to person, place, and time.   Psychiatric:         Attention and Perception: Attention normal.         Mood and Affect: Mood normal.         Behavior: Behavior normal.          Result Review :   The following data was reviewed by: GABY Lion on 03/21/2023:      Procedures    Assessment and Plan   Diagnoses and all orders for this visit:    1. Type 2 diabetes mellitus without complication, without long-term current use of insulin (HCC) (Primary)  -     Comprehensive Metabolic Panel; Future  -     Hemoglobin A1c; Future  -     Lipid Panel; Future  -     MicroAlbumin, Urine, Random - Urine, Clean Catch; Future  -     CBC (No Diff); Future    2. Hyperlipidemia, unspecified hyperlipidemia type  -     Comprehensive Metabolic Panel; Future  -     Lipid Panel; Future  -     CBC (No Diff); Future    3. Primary hypertension  -     Comprehensive Metabolic Panel; Future  -     Lipid Panel; Future  -     CBC (No Diff); Future    4. Upper respiratory tract infection, unspecified type  -     azithromycin (Zithromax Z-Simon) 250 MG tablet; Take 2 tablets by mouth on day 1, then 1 tablet daily on days 2-5  Dispense: 6 tablet; Refill: 0  -     methylPREDNISolone (MEDROL) 4 MG dose pack; Take as directed on package instructions.  Dispense: 21 tablet; Refill: 0    5. Acute cough  -     azithromycin (Zithromax Z-Simon) 250 MG tablet; Take 2 tablets by mouth on day 1, then 1 tablet daily on days 2-5  Dispense: 6 tablet; Refill: 0  -     methylPREDNISolone (MEDROL) 4 MG dose pack; Take as directed on package instructions.  Dispense: 21 tablet; Refill: 0          Follow Up   Return for Next scheduled follow up.  Patient was given instructions and counseling regarding his condition or for health maintenance advice. Please see specific information pulled into the AVS if appropriate.

## 2023-03-24 ENCOUNTER — LAB (OUTPATIENT)
Dept: LAB | Facility: HOSPITAL | Age: 53
End: 2023-03-24
Payer: OTHER GOVERNMENT

## 2023-03-24 DIAGNOSIS — Z95.2 HX OF MITRAL VALVE REPLACEMENT WITH MECHANICAL VALVE: ICD-10-CM

## 2023-03-24 LAB
INR PPP: 3.2 (ref 0.86–1.15)
PROTHROMBIN TIME: 32.1 SECONDS (ref 11.8–14.9)

## 2023-03-24 PROCEDURE — 36415 COLL VENOUS BLD VENIPUNCTURE: CPT

## 2023-03-24 PROCEDURE — 85610 PROTHROMBIN TIME: CPT

## 2023-03-27 ENCOUNTER — ANTICOAGULATION VISIT (OUTPATIENT)
Dept: CARDIOLOGY | Facility: CLINIC | Age: 53
End: 2023-03-27
Payer: OTHER GOVERNMENT

## 2023-03-27 ENCOUNTER — TELEPHONE (OUTPATIENT)
Dept: CARDIOLOGY | Facility: CLINIC | Age: 53
End: 2023-03-27
Payer: OTHER GOVERNMENT

## 2023-03-27 DIAGNOSIS — Z95.2 HX OF MITRAL VALVE REPLACEMENT WITH MECHANICAL VALVE: Primary | ICD-10-CM

## 2023-03-27 NOTE — PROGRESS NOTES
Lab Results   Component Value Date    INR 3.20 (H) 03/24/2023    INR 3.52 (H) 03/21/2023    INR 3.58 (H) 02/21/2023    PROTIME 32.1 (H) 03/24/2023    PROTIME 34.5 (H) 03/21/2023    PROTIME 36.6 (H) 02/21/2023   Hx of mitral valve replacement with mechanical valve   Range 2.5-3.5  8/9  [facility] lab    Pt is aware of results and instructions.  FYI: Pt is currently on steroids and an antibiotic. He is going to recheck INR sooner than 4 weeks.

## 2023-03-28 DIAGNOSIS — N52.9 ERECTILE DYSFUNCTION, UNSPECIFIED ERECTILE DYSFUNCTION TYPE: ICD-10-CM

## 2023-03-28 RX ORDER — SILDENAFIL 100 MG/1
TABLET, FILM COATED ORAL
Qty: 30 TABLET | Refills: 1 | Status: SHIPPED | OUTPATIENT
Start: 2023-03-28

## 2023-03-29 ENCOUNTER — LAB (OUTPATIENT)
Dept: LAB | Facility: HOSPITAL | Age: 53
End: 2023-03-29
Payer: OTHER GOVERNMENT

## 2023-03-29 DIAGNOSIS — Z95.2 HX OF MITRAL VALVE REPLACEMENT WITH MECHANICAL VALVE: ICD-10-CM

## 2023-03-29 LAB
INR PPP: 2.6 (ref 0.86–1.15)
PROTHROMBIN TIME: 27.4 SECONDS (ref 11.8–14.9)

## 2023-03-29 PROCEDURE — 36415 COLL VENOUS BLD VENIPUNCTURE: CPT

## 2023-03-29 PROCEDURE — 85610 PROTHROMBIN TIME: CPT

## 2023-03-30 ENCOUNTER — ANTICOAGULATION VISIT (OUTPATIENT)
Dept: CARDIOLOGY | Facility: CLINIC | Age: 53
End: 2023-03-30
Payer: OTHER GOVERNMENT

## 2023-03-30 DIAGNOSIS — Z95.2 HX OF MITRAL VALVE REPLACEMENT WITH MECHANICAL VALVE: Primary | ICD-10-CM

## 2023-03-30 NOTE — PROGRESS NOTES
Lab Results   Component Value Date    INR 2.60 (H) 03/29/2023    INR 3.20 (H) 03/24/2023    INR 3.52 (H) 03/21/2023    PROTIME 27.4 (H) 03/29/2023    PROTIME 32.1 (H) 03/24/2023    PROTIME 34.5 (H) 03/21/2023   Hx of mitral valve replacement with mechanical valve  Range 2.5-3.5  8/9  [facility] lab    Pt is aware of results and instructions.

## 2023-05-01 ENCOUNTER — LAB (OUTPATIENT)
Dept: LAB | Facility: HOSPITAL | Age: 53
End: 2023-05-01
Payer: OTHER GOVERNMENT

## 2023-05-01 DIAGNOSIS — Z95.2 HX OF MITRAL VALVE REPLACEMENT WITH MECHANICAL VALVE: ICD-10-CM

## 2023-05-01 LAB
INR PPP: 3.88 (ref 0.86–1.15)
PROTHROMBIN TIME: 37.2 SECONDS (ref 11.8–14.9)

## 2023-05-01 PROCEDURE — 85610 PROTHROMBIN TIME: CPT

## 2023-05-01 PROCEDURE — 36415 COLL VENOUS BLD VENIPUNCTURE: CPT

## 2023-05-02 ENCOUNTER — ANTICOAGULATION VISIT (OUTPATIENT)
Dept: CARDIOLOGY | Facility: CLINIC | Age: 53
End: 2023-05-02
Payer: OTHER GOVERNMENT

## 2023-05-02 DIAGNOSIS — Z95.2 HX OF MITRAL VALVE REPLACEMENT WITH MECHANICAL VALVE: Primary | ICD-10-CM

## 2023-05-02 NOTE — PROGRESS NOTES
Lab Results   Component Value Date    INR 3.88 (H) 05/01/2023    INR 2.60 (H) 03/29/2023    INR 3.20 (H) 03/24/2023    PROTIME 37.2 (H) 05/01/2023    PROTIME 27.4 (H) 03/29/2023    PROTIME 32.1 (H) 03/24/2023   Hx of mitral valve replacement with mechanical valve  Range 2.5-3.5  8/9  [facility] lab    Pt is aware of results and instructions.

## 2023-05-15 ENCOUNTER — LAB (OUTPATIENT)
Dept: LAB | Facility: HOSPITAL | Age: 53
End: 2023-05-15
Payer: OTHER GOVERNMENT

## 2023-05-15 ENCOUNTER — OFFICE VISIT (OUTPATIENT)
Dept: FAMILY MEDICINE CLINIC | Facility: CLINIC | Age: 53
End: 2023-05-15
Payer: OTHER GOVERNMENT

## 2023-05-15 VITALS
WEIGHT: 225 LBS | SYSTOLIC BLOOD PRESSURE: 128 MMHG | DIASTOLIC BLOOD PRESSURE: 86 MMHG | BODY MASS INDEX: 32.21 KG/M2 | TEMPERATURE: 96.5 F | HEIGHT: 70 IN | HEART RATE: 94 BPM | OXYGEN SATURATION: 98 %

## 2023-05-15 DIAGNOSIS — Z95.2 HX OF MITRAL VALVE REPLACEMENT WITH MECHANICAL VALVE: ICD-10-CM

## 2023-05-15 DIAGNOSIS — E11.9 TYPE 2 DIABETES MELLITUS WITHOUT COMPLICATION, WITHOUT LONG-TERM CURRENT USE OF INSULIN: Primary | ICD-10-CM

## 2023-05-15 DIAGNOSIS — Z12.5 SCREENING FOR PROSTATE CANCER: ICD-10-CM

## 2023-05-15 DIAGNOSIS — E78.5 HYPERLIPIDEMIA, UNSPECIFIED HYPERLIPIDEMIA TYPE: ICD-10-CM

## 2023-05-15 DIAGNOSIS — I10 PRIMARY HYPERTENSION: ICD-10-CM

## 2023-05-15 LAB
INR PPP: 2.92 (ref 0.86–1.15)
PROTHROMBIN TIME: 30 SECONDS (ref 11.8–14.9)

## 2023-05-15 PROCEDURE — 36415 COLL VENOUS BLD VENIPUNCTURE: CPT

## 2023-05-15 PROCEDURE — 85610 PROTHROMBIN TIME: CPT

## 2023-05-15 RX ORDER — SEMAGLUTIDE 1.34 MG/ML
0.5 INJECTION, SOLUTION SUBCUTANEOUS WEEKLY
Qty: 4.5 ML | Refills: 1 | Status: SHIPPED | OUTPATIENT
Start: 2023-05-15

## 2023-05-15 NOTE — PROGRESS NOTES
"Chief Complaint  Diabetes (6 month follow up )    Subjective         Kevin Clifton presents to [facility]  Presents to the office today for 6-month follow-up regarding his diabetes.  Patient states that he would like to change his Trulicity to a different medication because it is causing constipation.  I did discuss Ozempic.  Patient states he is willing to try this.  Did review his lab work that was completed in March.  A1c was 6.3%.  All other labs were unremarkable with exception of his cholesterol.  LDL was slightly elevated.  He does take atorvastatin nightly.  Blood pressure is 128/86.  Denies any chest pain shortness breath palpitations this time.  Denies     Diabetes         Objective     Vitals:    05/15/23 1537   BP: 128/86   BP Location: Right arm   Patient Position: Sitting   Cuff Size: Adult   Pulse: 94   Temp: 96.5 °F (35.8 °C)   TempSrc: Temporal   SpO2: 98%   Weight: 102 kg (225 lb)   Height: 177.8 cm (70\")      Body mass index is 32.28 kg/m².    BMI is >= 30 and <35. (Class 1 Obesity). The following options were offered after discussion;: nutrition counseling/recommendations        Physical Exam  Vitals reviewed.   Constitutional:       Appearance: Normal appearance.   Cardiovascular:      Rate and Rhythm: Normal rate and regular rhythm.      Pulses: Normal pulses.      Heart sounds: S1 normal and S2 normal. Murmur heard.      Comments: Mechanical heart heart valve sounds present  Pulmonary:      Effort: Pulmonary effort is normal. No respiratory distress.      Breath sounds: Normal breath sounds.   Skin:     General: Skin is warm and dry.   Neurological:      Mental Status: He is alert and oriented to person, place, and time.   Psychiatric:         Attention and Perception: Attention normal.         Mood and Affect: Mood normal.         Behavior: Behavior normal.          Result Review :   The following data was reviewed by: GABY Lion on " 05/15/2023:      Procedures    Assessment and Plan   Diagnoses and all orders for this visit:    1. Type 2 diabetes mellitus without complication, without long-term current use of insulin (Primary)  -     CBC (No Diff); Future  -     Comprehensive Metabolic Panel; Future  -     Hemoglobin A1c; Future  -     Lipid Panel; Future  -     Microalbumin / Creatinine Urine Ratio - Urine, Clean Catch; Future  -     TSH; Future  -     Semaglutide,0.25 or 0.5MG/DOS, (Ozempic, 0.25 or 0.5 MG/DOSE,) 2 MG/1.5ML solution pen-injector; Inject 0.5 mg under the skin into the appropriate area as directed 1 (One) Time Per Week.  Dispense: 4.5 mL; Refill: 1    2. Hyperlipidemia, unspecified hyperlipidemia type  -     CBC (No Diff); Future  -     Comprehensive Metabolic Panel; Future  -     Hemoglobin A1c; Future  -     Lipid Panel; Future  -     Microalbumin / Creatinine Urine Ratio - Urine, Clean Catch; Future  -     TSH; Future    3. Primary hypertension  -     CBC (No Diff); Future  -     Comprehensive Metabolic Panel; Future    4. Screening for prostate cancer  -     PSA Screen; Future          Follow Up   Return in about 6 months (around 11/15/2023) for Recheck.  Patient was given instructions and counseling regarding his condition or for health maintenance advice. Please see specific information pulled into the AVS if appropriate.

## 2023-05-16 ENCOUNTER — ANTICOAGULATION VISIT (OUTPATIENT)
Dept: CARDIOLOGY | Facility: CLINIC | Age: 53
End: 2023-05-16
Payer: OTHER GOVERNMENT

## 2023-05-16 DIAGNOSIS — Z95.2 HX OF MITRAL VALVE REPLACEMENT WITH MECHANICAL VALVE: Primary | ICD-10-CM

## 2023-05-16 NOTE — PROGRESS NOTES
Lab Results   Component Value Date    INR 2.92 (H) 05/15/2023    INR 3.88 (H) 05/01/2023    INR 2.60 (H) 03/29/2023    PROTIME 30.0 (H) 05/15/2023    PROTIME 37.2 (H) 05/01/2023    PROTIME 27.4 (H) 03/29/2023     Dx: hx of mitral valve replacement with mechanical valve  Pt taking 8/8/9  Range: 2.5-3.5  [facility]

## 2023-05-27 DIAGNOSIS — N52.9 ERECTILE DYSFUNCTION, UNSPECIFIED ERECTILE DYSFUNCTION TYPE: ICD-10-CM

## 2023-05-30 RX ORDER — SILDENAFIL 100 MG/1
TABLET, FILM COATED ORAL
Qty: 30 TABLET | Refills: 1 | Status: SHIPPED | OUTPATIENT
Start: 2023-05-30

## 2023-06-01 ENCOUNTER — LAB (OUTPATIENT)
Dept: LAB | Facility: HOSPITAL | Age: 53
End: 2023-06-01
Payer: OTHER GOVERNMENT

## 2023-06-01 DIAGNOSIS — Z95.2 HX OF MITRAL VALVE REPLACEMENT WITH MECHANICAL VALVE: ICD-10-CM

## 2023-06-01 LAB
INR PPP: 3.91 (ref 0.86–1.15)
PROTHROMBIN TIME: 37.3 SECONDS (ref 11.8–14.9)

## 2023-06-01 PROCEDURE — 85610 PROTHROMBIN TIME: CPT

## 2023-06-01 PROCEDURE — 36415 COLL VENOUS BLD VENIPUNCTURE: CPT

## 2023-06-02 ENCOUNTER — ANTICOAGULATION VISIT (OUTPATIENT)
Dept: CARDIOLOGY | Facility: CLINIC | Age: 53
End: 2023-06-02

## 2023-06-02 DIAGNOSIS — Z95.2 HX OF MITRAL VALVE REPLACEMENT WITH MECHANICAL VALVE: Primary | ICD-10-CM

## 2023-06-02 NOTE — PROGRESS NOTES
Lab Results   Component Value Date    INR 3.91 (H) 06/01/2023    INR 2.92 (H) 05/15/2023    INR 3.88 (H) 05/01/2023    PROTIME 37.3 (H) 06/01/2023    PROTIME 30.0 (H) 05/15/2023    PROTIME 37.2 (H) 05/01/2023   Hx of mitral valve replacement with mechanical valve  Range 2.5-3.5  8/8/9  [facility] lab    Pt is aware of results and instructions.

## 2023-06-06 ENCOUNTER — LAB (OUTPATIENT)
Dept: LAB | Facility: HOSPITAL | Age: 53
End: 2023-06-06
Payer: OTHER GOVERNMENT

## 2023-06-06 DIAGNOSIS — Z95.2 HX OF MITRAL VALVE REPLACEMENT WITH MECHANICAL VALVE: ICD-10-CM

## 2023-06-06 LAB
INR PPP: 2.41 (ref 0.86–1.15)
PROTHROMBIN TIME: 25.9 SECONDS (ref 11.8–14.9)

## 2023-06-06 PROCEDURE — 85610 PROTHROMBIN TIME: CPT

## 2023-06-06 PROCEDURE — 36415 COLL VENOUS BLD VENIPUNCTURE: CPT

## 2023-06-07 ENCOUNTER — ANTICOAGULATION VISIT (OUTPATIENT)
Dept: CARDIOLOGY | Facility: CLINIC | Age: 53
End: 2023-06-07
Payer: OTHER GOVERNMENT

## 2023-06-07 DIAGNOSIS — Z95.2 HX OF MITRAL VALVE REPLACEMENT WITH MECHANICAL VALVE: Primary | ICD-10-CM

## 2023-06-07 NOTE — PROGRESS NOTES
Lab Results   Component Value Date    INR 2.41 (H) 06/06/2023    INR 3.91 (H) 06/01/2023    INR 2.92 (H) 05/15/2023    PROTIME 25.9 (H) 06/06/2023    PROTIME 37.3 (H) 06/01/2023    PROTIME 30.0 (H) 05/15/2023    Hx of mitral valve replacement with mechanical valve   Range 2.5-3.5  8 mg  [facility] lab    Pt is aware of results and instructions.

## 2023-06-14 ENCOUNTER — LAB (OUTPATIENT)
Dept: LAB | Facility: HOSPITAL | Age: 53
End: 2023-06-14
Payer: OTHER GOVERNMENT

## 2023-06-14 DIAGNOSIS — Z95.2 HX OF MITRAL VALVE REPLACEMENT WITH MECHANICAL VALVE: ICD-10-CM

## 2023-06-14 LAB
INR PPP: 3 (ref 0.86–1.15)
PROTHROMBIN TIME: 30.5 SECONDS (ref 11.8–14.9)

## 2023-06-14 PROCEDURE — 36415 COLL VENOUS BLD VENIPUNCTURE: CPT

## 2023-06-14 PROCEDURE — 85610 PROTHROMBIN TIME: CPT

## 2023-08-07 ENCOUNTER — LAB (OUTPATIENT)
Dept: LAB | Facility: HOSPITAL | Age: 53
End: 2023-08-07
Payer: OTHER GOVERNMENT

## 2023-08-07 DIAGNOSIS — Z95.2 HX OF MITRAL VALVE REPLACEMENT WITH MECHANICAL VALVE: ICD-10-CM

## 2023-08-07 LAB
INR PPP: 3.56 (ref 0.86–1.15)
PROTHROMBIN TIME: 34.8 SECONDS (ref 11.8–14.9)

## 2023-08-07 PROCEDURE — 85610 PROTHROMBIN TIME: CPT

## 2023-08-07 PROCEDURE — 36415 COLL VENOUS BLD VENIPUNCTURE: CPT

## 2023-08-08 ENCOUNTER — ANTICOAGULATION VISIT (OUTPATIENT)
Dept: CARDIOLOGY | Facility: CLINIC | Age: 53
End: 2023-08-08
Payer: OTHER GOVERNMENT

## 2023-08-08 DIAGNOSIS — Z95.2 HX OF MITRAL VALVE REPLACEMENT WITH MECHANICAL VALVE: Primary | ICD-10-CM

## 2023-08-08 NOTE — PROGRESS NOTES
Lab Results   Component Value Date    INR 3.56 (H) 08/07/2023    INR 3.00 (H) 06/14/2023    INR 2.41 (H) 06/06/2023    PROTIME 34.8 (H) 08/07/2023    PROTIME 30.5 (H) 06/14/2023    PROTIME 25.9 (H) 06/06/2023    Hx of mitral valve replacement with mechanical valve   Range 2.5-3.5  9/8/8  [facility] lab    Pt is aware of results and instructions.

## 2023-08-18 ENCOUNTER — LAB (OUTPATIENT)
Dept: LAB | Facility: HOSPITAL | Age: 53
End: 2023-08-18
Payer: OTHER GOVERNMENT

## 2023-08-18 DIAGNOSIS — Z95.2 HX OF MITRAL VALVE REPLACEMENT WITH MECHANICAL VALVE: ICD-10-CM

## 2023-08-18 LAB
INR PPP: 3.71 (ref 0.86–1.15)
PROTHROMBIN TIME: 35.9 SECONDS (ref 11.8–14.9)

## 2023-08-18 PROCEDURE — 36415 COLL VENOUS BLD VENIPUNCTURE: CPT

## 2023-08-18 PROCEDURE — 85610 PROTHROMBIN TIME: CPT

## 2023-08-21 ENCOUNTER — ANTICOAGULATION VISIT (OUTPATIENT)
Dept: CARDIOLOGY | Facility: CLINIC | Age: 53
End: 2023-08-21
Payer: OTHER GOVERNMENT

## 2023-08-21 DIAGNOSIS — Z95.2 HX OF MITRAL VALVE REPLACEMENT WITH MECHANICAL VALVE: Primary | ICD-10-CM

## 2023-08-21 NOTE — PROGRESS NOTES
Lab Results   Component Value Date    INR 3.71 (H) 08/18/2023    INR 3.56 (H) 08/07/2023    INR 3.00 (H) 06/14/2023    PROTIME 35.9 (H) 08/18/2023    PROTIME 34.8 (H) 08/07/2023    PROTIME 30.5 (H) 06/14/2023     Dx: hx of mitral valve replacement with mechanical valve  Pt taking 8  Range: 2.5-3.5  [facility]

## 2023-09-05 ENCOUNTER — LAB (OUTPATIENT)
Dept: LAB | Facility: HOSPITAL | Age: 53
End: 2023-09-05
Payer: OTHER GOVERNMENT

## 2023-09-05 ENCOUNTER — OFFICE VISIT (OUTPATIENT)
Dept: FAMILY MEDICINE CLINIC | Facility: CLINIC | Age: 53
End: 2023-09-05
Payer: OTHER GOVERNMENT

## 2023-09-05 VITALS
WEIGHT: 232.2 LBS | TEMPERATURE: 97.6 F | HEART RATE: 88 BPM | SYSTOLIC BLOOD PRESSURE: 132 MMHG | OXYGEN SATURATION: 98 % | HEIGHT: 70 IN | DIASTOLIC BLOOD PRESSURE: 76 MMHG | BODY MASS INDEX: 33.24 KG/M2

## 2023-09-05 DIAGNOSIS — R51.9 ACUTE NONINTRACTABLE HEADACHE, UNSPECIFIED HEADACHE TYPE: ICD-10-CM

## 2023-09-05 DIAGNOSIS — Z95.2 HX OF MITRAL VALVE REPLACEMENT WITH MECHANICAL VALVE: ICD-10-CM

## 2023-09-05 DIAGNOSIS — J34.89 SINUS DRAINAGE: ICD-10-CM

## 2023-09-05 DIAGNOSIS — J02.9 PHARYNGITIS, UNSPECIFIED ETIOLOGY: Primary | ICD-10-CM

## 2023-09-05 DIAGNOSIS — J01.00 ACUTE NON-RECURRENT MAXILLARY SINUSITIS: ICD-10-CM

## 2023-09-05 DIAGNOSIS — R06.7 SNEEZING: ICD-10-CM

## 2023-09-05 LAB
EXPIRATION DATE: NORMAL
EXPIRATION DATE: NORMAL
FLUAV AG UPPER RESP QL IA.RAPID: NOT DETECTED
FLUBV AG UPPER RESP QL IA.RAPID: NOT DETECTED
INR PPP: 3.01 (ref 0.86–1.15)
INTERNAL CONTROL: NORMAL
INTERNAL CONTROL: NORMAL
Lab: 7089
Lab: NORMAL
PROTHROMBIN TIME: 30.6 SECONDS (ref 11.8–14.9)
S PYO AG THROAT QL: NEGATIVE
SARS-COV-2 AG UPPER RESP QL IA.RAPID: NOT DETECTED

## 2023-09-05 PROCEDURE — 85610 PROTHROMBIN TIME: CPT

## 2023-09-05 PROCEDURE — 36415 COLL VENOUS BLD VENIPUNCTURE: CPT

## 2023-09-05 PROCEDURE — 87428 SARSCOV & INF VIR A&B AG IA: CPT | Performed by: NURSE PRACTITIONER

## 2023-09-05 PROCEDURE — 87880 STREP A ASSAY W/OPTIC: CPT | Performed by: NURSE PRACTITIONER

## 2023-09-05 PROCEDURE — 99213 OFFICE O/P EST LOW 20 MIN: CPT | Performed by: NURSE PRACTITIONER

## 2023-09-05 RX ORDER — ATORVASTATIN CALCIUM 20 MG/1
20 TABLET, FILM COATED ORAL NIGHTLY
COMMUNITY

## 2023-09-05 RX ORDER — AMOXICILLIN AND CLAVULANATE POTASSIUM 875; 125 MG/1; MG/1
1 TABLET, FILM COATED ORAL 2 TIMES DAILY
Qty: 20 TABLET | Refills: 0 | Status: SHIPPED | OUTPATIENT
Start: 2023-09-05 | End: 2023-09-15

## 2023-09-05 NOTE — PROGRESS NOTES
"Chief Complaint  Sore Throat    Subjective         Kevin Clifton presents to [facility]  Presents to the office today with complaints of sinus congestion pressure.  Patient also states he has been having sore throat, headaches sneezing.  Patient states that symptoms have been present for over a week.  He states he been using over-the-counter medications with no improvement.  Blood pressure is 132/76.  Denies any chest pain shortness breath palpitations this time.  Patient denies any fevers.  He denies any nausea vomiting or diarrhea.      Sore Throat        Objective     Vitals:    09/05/23 1112   BP: 132/76   BP Location: Right arm   Patient Position: Sitting   Cuff Size: Adult   Pulse: 88   Temp: 97.6 °F (36.4 °C)   TempSrc: Temporal   SpO2: 98%   Weight: 105 kg (232 lb 3.2 oz)   Height: 177.8 cm (70\")      Body mass index is 33.32 kg/m².             Physical Exam  Constitutional:       Appearance: Normal appearance.   HENT:      Right Ear: Tympanic membrane, ear canal and external ear normal.      Left Ear: Tympanic membrane, ear canal and external ear normal.      Nose: Congestion and rhinorrhea present.      Mouth/Throat:      Mouth: Mucous membranes are moist.      Pharynx: Oropharynx is clear.   Cardiovascular:      Rate and Rhythm: Normal rate and regular rhythm.      Pulses: Normal pulses.      Heart sounds: Normal heart sounds, S1 normal and S2 normal. No murmur heard.  Pulmonary:      Effort: Pulmonary effort is normal. No respiratory distress.      Breath sounds: Normal breath sounds.   Skin:     General: Skin is warm and dry.   Neurological:      Mental Status: He is alert and oriented to person, place, and time.   Psychiatric:         Attention and Perception: Attention normal.         Mood and Affect: Mood normal.         Behavior: Behavior normal.        Result Review :   The following data was reviewed by: GABY Lion on " 09/05/2023:      Procedures    Assessment and Plan   Diagnoses and all orders for this visit:    1. Pharyngitis, unspecified etiology (Primary)  -     POCT SARS-CoV-2 Antigen BLANK + Flu  -     POCT rapid strep A    2. Acute nonintractable headache, unspecified headache type  -     POCT SARS-CoV-2 Antigen BLANK + Flu  -     POCT rapid strep A    3. Sinus drainage  -     POCT SARS-CoV-2 Antigen BLANK + Flu  -     POCT rapid strep A    4. Sneezing  -     POCT SARS-CoV-2 Antigen BLANK + Flu  -     POCT rapid strep A    5. Acute non-recurrent maxillary sinusitis  -     amoxicillin-clavulanate (AUGMENTIN) 875-125 MG per tablet; Take 1 tablet by mouth 2 (Two) Times a Day for 10 days.  Dispense: 20 tablet; Refill: 0      COVID, flu and strep swabs are negative.  I did discuss that we would start him on Augmentin to treat the sinus congestion and pressure.  Verbalized understanding.  I did tell him to take the medication with food to vent any nausea.      Follow Up   Return for Next scheduled follow up.  Patient was given instructions and counseling regarding his condition or for health maintenance advice. Please see specific information pulled into the AVS if appropriate.

## 2023-09-06 ENCOUNTER — ANTICOAGULATION VISIT (OUTPATIENT)
Dept: CARDIOLOGY | Facility: CLINIC | Age: 53
End: 2023-09-06
Payer: OTHER GOVERNMENT

## 2023-09-06 DIAGNOSIS — Z95.2 HX OF MITRAL VALVE REPLACEMENT WITH MECHANICAL VALVE: Primary | ICD-10-CM

## 2023-09-06 NOTE — PROGRESS NOTES
Lab Results   Component Value Date    INR 3.01 (H) 09/05/2023    INR 3.71 (H) 08/18/2023    INR 3.56 (H) 08/07/2023    PROTIME 30.6 (H) 09/05/2023    PROTIME 35.9 (H) 08/18/2023    PROTIME 34.8 (H) 08/07/2023     DX Hx of mitral valve replacement with mechanical valve   Taking 7/7/8 every 3 days   Range 2.5-3.5 [facility]

## 2023-10-06 ENCOUNTER — LAB (OUTPATIENT)
Dept: LAB | Facility: HOSPITAL | Age: 53
End: 2023-10-06
Payer: OTHER GOVERNMENT

## 2023-10-06 DIAGNOSIS — Z95.2 HX OF MITRAL VALVE REPLACEMENT WITH MECHANICAL VALVE: ICD-10-CM

## 2023-10-06 LAB
INR PPP: 2.21 (ref 0.86–1.15)
PROTHROMBIN TIME: 24.2 SECONDS (ref 11.8–14.9)

## 2023-10-06 PROCEDURE — 36415 COLL VENOUS BLD VENIPUNCTURE: CPT

## 2023-10-06 PROCEDURE — 85610 PROTHROMBIN TIME: CPT

## 2023-10-09 ENCOUNTER — ANTICOAGULATION VISIT (OUTPATIENT)
Dept: CARDIOLOGY | Facility: CLINIC | Age: 53
End: 2023-10-09
Payer: OTHER GOVERNMENT

## 2023-10-09 DIAGNOSIS — Z95.2 HX OF MITRAL VALVE REPLACEMENT WITH MECHANICAL VALVE: Primary | ICD-10-CM

## 2023-10-09 NOTE — PROGRESS NOTES
Lab Results   Component Value Date    INR 2.21 (H) 10/06/2023    INR 3.01 (H) 09/05/2023    INR 3.71 (H) 08/18/2023    PROTIME 24.2 (H) 10/06/2023    PROTIME 30.6 (H) 09/05/2023    PROTIME 35.9 (H) 08/18/2023    Hx of mitral valve replacement with mechanical valve   Range 2.5-3.5  7/7/8  [facility] lab    Pt is aware of results and instructions.

## 2023-10-12 ENCOUNTER — LAB (OUTPATIENT)
Dept: LAB | Facility: HOSPITAL | Age: 53
End: 2023-10-12
Payer: OTHER GOVERNMENT

## 2023-10-12 DIAGNOSIS — Z95.2 HX OF MITRAL VALVE REPLACEMENT WITH MECHANICAL VALVE: ICD-10-CM

## 2023-10-12 LAB
INR PPP: 2.58 (ref 0.86–1.15)
PROTHROMBIN TIME: 27.3 SECONDS (ref 11.8–14.9)

## 2023-10-12 PROCEDURE — 36415 COLL VENOUS BLD VENIPUNCTURE: CPT

## 2023-10-12 PROCEDURE — 85610 PROTHROMBIN TIME: CPT

## 2023-10-13 ENCOUNTER — ANTICOAGULATION VISIT (OUTPATIENT)
Dept: CARDIOLOGY | Facility: CLINIC | Age: 53
End: 2023-10-13
Payer: OTHER GOVERNMENT

## 2023-10-13 DIAGNOSIS — Z95.2 HX OF MITRAL VALVE REPLACEMENT WITH MECHANICAL VALVE: Primary | ICD-10-CM

## 2023-10-13 NOTE — PROGRESS NOTES
Lab Results   Component Value Date    INR 2.58 (H) 10/12/2023    INR 2.21 (H) 10/06/2023    INR 3.01 (H) 09/05/2023    PROTIME 27.3 (H) 10/12/2023    PROTIME 24.2 (H) 10/06/2023    PROTIME 30.6 (H) 09/05/2023     DX  Hx of mitral valve replacement with mechanical valve   Taking  8/8/7 every 3 days   Range 2.5-3.5  [facility]

## 2023-11-17 ENCOUNTER — LAB (OUTPATIENT)
Dept: LAB | Facility: HOSPITAL | Age: 53
End: 2023-11-17
Payer: OTHER GOVERNMENT

## 2023-11-17 ENCOUNTER — ANTICOAGULATION VISIT (OUTPATIENT)
Dept: CARDIOLOGY | Facility: CLINIC | Age: 53
End: 2023-11-17
Payer: OTHER GOVERNMENT

## 2023-11-17 DIAGNOSIS — Z12.5 SCREENING FOR PROSTATE CANCER: ICD-10-CM

## 2023-11-17 DIAGNOSIS — Z95.2 HX OF MITRAL VALVE REPLACEMENT WITH MECHANICAL VALVE: Primary | ICD-10-CM

## 2023-11-17 DIAGNOSIS — Z95.2 HX OF MITRAL VALVE REPLACEMENT WITH MECHANICAL VALVE: ICD-10-CM

## 2023-11-17 DIAGNOSIS — E78.5 HYPERLIPIDEMIA, UNSPECIFIED HYPERLIPIDEMIA TYPE: ICD-10-CM

## 2023-11-17 DIAGNOSIS — E11.9 TYPE 2 DIABETES MELLITUS WITHOUT COMPLICATION, WITHOUT LONG-TERM CURRENT USE OF INSULIN: ICD-10-CM

## 2023-11-17 DIAGNOSIS — I10 PRIMARY HYPERTENSION: ICD-10-CM

## 2023-11-17 LAB
ALBUMIN SERPL-MCNC: 4.2 G/DL (ref 3.5–5.2)
ALBUMIN UR-MCNC: <1.2 MG/DL
ALBUMIN/GLOB SERPL: 1.3 G/DL
ALP SERPL-CCNC: 42 U/L (ref 39–117)
ALT SERPL W P-5'-P-CCNC: 27 U/L (ref 1–41)
ANION GAP SERPL CALCULATED.3IONS-SCNC: 8.5 MMOL/L (ref 5–15)
AST SERPL-CCNC: 26 U/L (ref 1–40)
BILIRUB SERPL-MCNC: 0.7 MG/DL (ref 0–1.2)
BUN SERPL-MCNC: 9 MG/DL (ref 6–20)
BUN/CREAT SERPL: 8.1 (ref 7–25)
CALCIUM SPEC-SCNC: 9.2 MG/DL (ref 8.6–10.5)
CHLORIDE SERPL-SCNC: 105 MMOL/L (ref 98–107)
CHOLEST SERPL-MCNC: 205 MG/DL (ref 0–200)
CO2 SERPL-SCNC: 24.5 MMOL/L (ref 22–29)
CREAT SERPL-MCNC: 1.11 MG/DL (ref 0.76–1.27)
CREAT UR-MCNC: 189.1 MG/DL
DEPRECATED RDW RBC AUTO: 37.5 FL (ref 37–54)
EGFRCR SERPLBLD CKD-EPI 2021: 79.4 ML/MIN/1.73
ERYTHROCYTE [DISTWIDTH] IN BLOOD BY AUTOMATED COUNT: 13.9 % (ref 12.3–15.4)
GLOBULIN UR ELPH-MCNC: 3.3 GM/DL
GLUCOSE SERPL-MCNC: 89 MG/DL (ref 65–99)
HBA1C MFR BLD: 6.3 % (ref 4.8–5.6)
HCT VFR BLD AUTO: 44.5 % (ref 37.5–51)
HDLC SERPL-MCNC: 45 MG/DL (ref 40–60)
HGB BLD-MCNC: 13.5 G/DL (ref 13–17.7)
INR PPP: 2.43 (ref 0.86–1.15)
LDLC SERPL CALC-MCNC: 142 MG/DL (ref 0–100)
LDLC/HDLC SERPL: 3.12 {RATIO}
MCH RBC QN AUTO: 23 PG (ref 26.6–33)
MCHC RBC AUTO-ENTMCNC: 30.3 G/DL (ref 31.5–35.7)
MCV RBC AUTO: 75.8 FL (ref 79–97)
MICROALBUMIN/CREAT UR: NORMAL MG/G{CREAT}
PLATELET # BLD AUTO: 271 10*3/MM3 (ref 140–450)
PMV BLD AUTO: 11.5 FL (ref 6–12)
POTASSIUM SERPL-SCNC: 4.3 MMOL/L (ref 3.5–5.2)
PROT SERPL-MCNC: 7.5 G/DL (ref 6–8.5)
PROTHROMBIN TIME: 26.9 SECONDS (ref 11.8–14.9)
PSA SERPL-MCNC: 0.35 NG/ML (ref 0–4)
RBC # BLD AUTO: 5.87 10*6/MM3 (ref 4.14–5.8)
SODIUM SERPL-SCNC: 138 MMOL/L (ref 136–145)
TRIGL SERPL-MCNC: 99 MG/DL (ref 0–150)
TSH SERPL DL<=0.05 MIU/L-ACNC: 1.76 UIU/ML (ref 0.27–4.2)
VLDLC SERPL-MCNC: 18 MG/DL (ref 5–40)
WBC NRBC COR # BLD AUTO: 4.5 10*3/MM3 (ref 3.4–10.8)

## 2023-11-17 PROCEDURE — 84443 ASSAY THYROID STIM HORMONE: CPT

## 2023-11-17 PROCEDURE — 85610 PROTHROMBIN TIME: CPT

## 2023-11-17 PROCEDURE — 80053 COMPREHEN METABOLIC PANEL: CPT

## 2023-11-17 PROCEDURE — 36415 COLL VENOUS BLD VENIPUNCTURE: CPT

## 2023-11-17 PROCEDURE — G0103 PSA SCREENING: HCPCS

## 2023-11-17 PROCEDURE — 82043 UR ALBUMIN QUANTITATIVE: CPT

## 2023-11-17 PROCEDURE — 85027 COMPLETE CBC AUTOMATED: CPT

## 2023-11-17 PROCEDURE — 82570 ASSAY OF URINE CREATININE: CPT

## 2023-11-17 PROCEDURE — 80061 LIPID PANEL: CPT

## 2023-11-17 PROCEDURE — 83036 HEMOGLOBIN GLYCOSYLATED A1C: CPT

## 2023-11-17 NOTE — PROGRESS NOTES
Lab Results   Component Value Date    INR 2.43 (H) 11/17/2023    INR 2.58 (H) 10/12/2023    INR 2.21 (H) 10/06/2023    PROTIME 26.9 (H) 11/17/2023    PROTIME 27.3 (H) 10/12/2023    PROTIME 24.2 (H) 10/06/2023    Hx of mitral valve replacement with mechanical valve   Range 2.5-3.5  8/8/7  [facility] lab      FYI, Pt advised me he is already taking 8 mg daily. Please advise.    Pt is aware of results and instructions.

## 2023-11-20 ENCOUNTER — OFFICE VISIT (OUTPATIENT)
Dept: FAMILY MEDICINE CLINIC | Facility: CLINIC | Age: 53
End: 2023-11-20
Payer: OTHER GOVERNMENT

## 2023-11-20 VITALS
BODY MASS INDEX: 33.87 KG/M2 | HEART RATE: 130 BPM | SYSTOLIC BLOOD PRESSURE: 166 MMHG | WEIGHT: 236.6 LBS | DIASTOLIC BLOOD PRESSURE: 96 MMHG | HEIGHT: 70 IN | OXYGEN SATURATION: 99 % | TEMPERATURE: 96.7 F

## 2023-11-20 DIAGNOSIS — Z95.2 HX OF MITRAL VALVE REPLACEMENT WITH MECHANICAL VALVE: ICD-10-CM

## 2023-11-20 DIAGNOSIS — I48.0 PAROXYSMAL ATRIAL FIBRILLATION: ICD-10-CM

## 2023-11-20 DIAGNOSIS — K22.719 BARRETT'S ESOPHAGUS WITH DYSPLASIA: ICD-10-CM

## 2023-11-20 DIAGNOSIS — E78.5 HYPERLIPIDEMIA, UNSPECIFIED HYPERLIPIDEMIA TYPE: ICD-10-CM

## 2023-11-20 DIAGNOSIS — E11.9 TYPE 2 DIABETES MELLITUS WITHOUT COMPLICATION, WITHOUT LONG-TERM CURRENT USE OF INSULIN: ICD-10-CM

## 2023-11-20 DIAGNOSIS — Z86.010 HISTORY OF COLON POLYPS: Primary | ICD-10-CM

## 2023-11-20 DIAGNOSIS — I10 PRIMARY HYPERTENSION: ICD-10-CM

## 2023-11-20 RX ORDER — SEMAGLUTIDE 1.34 MG/ML
0.25 INJECTION, SOLUTION SUBCUTANEOUS WEEKLY
Qty: 4.5 ML | Refills: 1 | Status: SHIPPED | OUTPATIENT
Start: 2023-11-20 | End: 2023-11-20 | Stop reason: ALTCHOICE

## 2023-11-20 RX ORDER — ATORVASTATIN CALCIUM 20 MG/1
20 TABLET, FILM COATED ORAL NIGHTLY
Qty: 90 TABLET | Refills: 1 | Status: SHIPPED | OUTPATIENT
Start: 2023-11-20

## 2023-11-20 RX ORDER — DULAGLUTIDE 0.75 MG/.5ML
0.75 INJECTION, SOLUTION SUBCUTANEOUS WEEKLY
Qty: 6 ML | Refills: 0 | Status: SHIPPED | OUTPATIENT
Start: 2023-11-20

## 2023-11-20 NOTE — PROGRESS NOTES
"Chief Complaint  Diabetes (6 month follow up)    Subjective         Kevin Clifton presents to [facility]  Presents to the office today for 6-month follow-up regarding his diabetes.  Patient states that he would like to get back on a GLP.  He states that his insurance would not cover the Ozempic.  He states that Trulicity seem to work well for him and like to get back on this.  I did review recent lab with him including his A1c is 6.3%.  His LDL was slightly elevated at 142.  I did recommend a low-cholesterol diet avoiding fried fatty foods.  All other labs are unremarkable.      Diabetes         Objective     Vitals:    11/20/23 1542 11/20/23 1600   BP: 146/100 166/96   BP Location: Right arm Left arm   Patient Position: Sitting Sitting   Cuff Size: Adult Adult   Pulse: (!) 130    Temp: 96.7 °F (35.9 °C)    TempSrc: Temporal    SpO2: 99%    Weight: 107 kg (236 lb 9.6 oz)    Height: 177.8 cm (70\")       Body mass index is 33.95 kg/m².             Physical Exam  Vitals reviewed.   Constitutional:       Appearance: Normal appearance.   Cardiovascular:      Rate and Rhythm: Normal rate and regular rhythm.      Pulses: Normal pulses.      Heart sounds: Normal heart sounds, S1 normal and S2 normal. No murmur heard.     Comments: Mechanical heart sounds noted  Pulmonary:      Effort: Pulmonary effort is normal. No respiratory distress.      Breath sounds: Normal breath sounds.   Skin:     General: Skin is warm and dry.   Neurological:      Mental Status: He is alert and oriented to person, place, and time.   Psychiatric:         Attention and Perception: Attention normal.         Mood and Affect: Mood normal.         Behavior: Behavior normal.          Result Review :   The following data was reviewed by: GABY Lion on 11/20/2023:      Procedures    Assessment and Plan   Diagnoses and all orders for this visit:    1. History of colon polyps (Primary)  -     Ambulatory Referral For " Screening Colonoscopy    2. Carr's esophagus with dysplasia  -     Ambulatory referral for Screening EGD    3. Type 2 diabetes mellitus without complication, without long-term current use of insulin  -     Discontinue: Semaglutide,0.25 or 0.5MG/DOS, (Ozempic, 0.25 or 0.5 MG/DOSE,) 2 MG/1.5ML solution pen-injector; Inject 0.25 mg under the skin into the appropriate area as directed 1 (One) Time Per Week.  Dispense: 4.5 mL; Refill: 1  -     Dulaglutide (Trulicity) 0.75 MG/0.5ML solution pen-injector; Inject 0.75 mg under the skin into the appropriate area as directed 1 (One) Time Per Week.  Dispense: 6 mL; Refill: 0  -     CBC (No Diff); Future  -     Comprehensive Metabolic Panel; Future  -     Lipid Panel; Future  -     TSH+Free T4; Future  -     Hemoglobin A1c; Future  -     Microalbumin / Creatinine Urine Ratio - Urine, Clean Catch; Future    4. Hyperlipidemia, unspecified hyperlipidemia type  -     CBC (No Diff); Future  -     Comprehensive Metabolic Panel; Future  -     Lipid Panel; Future  -     TSH+Free T4; Future  -     Hemoglobin A1c; Future  -     Microalbumin / Creatinine Urine Ratio - Urine, Clean Catch; Future  -     atorvastatin (LIPITOR) 20 MG tablet; Take 1 tablet by mouth Every Night.  Dispense: 90 tablet; Refill: 1    5. Primary hypertension  -     CBC (No Diff); Future  -     Comprehensive Metabolic Panel; Future  -     Lipid Panel; Future  -     TSH+Free T4; Future  -     Hemoglobin A1c; Future  -     Microalbumin / Creatinine Urine Ratio - Urine, Clean Catch; Future    6. Hx of mitral valve replacement with mechanical valve    7. Paroxysmal atrial fibrillation          Follow Up   Return in about 6 months (around 5/20/2024) for Recheck.  Patient was given instructions and counseling regarding his condition or for health maintenance advice. Please see specific information pulled into the AVS if appropriate.

## 2023-12-06 ENCOUNTER — LAB (OUTPATIENT)
Dept: LAB | Facility: HOSPITAL | Age: 53
End: 2023-12-06
Payer: OTHER GOVERNMENT

## 2023-12-06 DIAGNOSIS — Z95.2 HX OF MITRAL VALVE REPLACEMENT WITH MECHANICAL VALVE: ICD-10-CM

## 2023-12-06 LAB
INR PPP: 2.89 (ref 0.86–1.15)
PROTHROMBIN TIME: 30.7 SECONDS (ref 11.8–14.9)

## 2023-12-06 PROCEDURE — 85610 PROTHROMBIN TIME: CPT

## 2023-12-06 PROCEDURE — 36415 COLL VENOUS BLD VENIPUNCTURE: CPT

## 2023-12-07 ENCOUNTER — ANTICOAGULATION VISIT (OUTPATIENT)
Dept: CARDIOLOGY | Facility: CLINIC | Age: 53
End: 2023-12-07
Payer: OTHER GOVERNMENT

## 2023-12-07 DIAGNOSIS — Z95.2 HX OF MITRAL VALVE REPLACEMENT WITH MECHANICAL VALVE: Primary | ICD-10-CM

## 2023-12-07 NOTE — PROGRESS NOTES
Lab Results   Component Value Date    INR 2.89 (H) 12/06/2023    INR 2.43 (H) 11/17/2023    INR 2.58 (H) 10/12/2023    PROTIME 30.7 (H) 12/06/2023    PROTIME 26.9 (H) 11/17/2023    PROTIME 27.3 (H) 10/12/2023    Hx of mitral valve replacement with mechanical valve   Range 2.5-3.5  8 mg  [facility] lab      Pt stated he has been taking 8/8/9. Same instructions?    Pt is aware of results and instructions.

## 2023-12-12 ENCOUNTER — TELEPHONE (OUTPATIENT)
Dept: GASTROENTEROLOGY | Facility: CLINIC | Age: 53
End: 2023-12-12
Payer: OTHER GOVERNMENT

## 2023-12-12 ENCOUNTER — PREP FOR SURGERY (OUTPATIENT)
Dept: OTHER | Facility: HOSPITAL | Age: 53
End: 2023-12-12
Payer: OTHER GOVERNMENT

## 2023-12-12 ENCOUNTER — CLINICAL SUPPORT (OUTPATIENT)
Dept: GASTROENTEROLOGY | Facility: CLINIC | Age: 53
End: 2023-12-12
Payer: OTHER GOVERNMENT

## 2023-12-12 DIAGNOSIS — K22.70 BARRETT'S ESOPHAGUS WITHOUT DYSPLASIA: ICD-10-CM

## 2023-12-12 DIAGNOSIS — Z12.11 ENCOUNTER FOR SCREENING FOR MALIGNANT NEOPLASM OF COLON: Primary | ICD-10-CM

## 2023-12-12 NOTE — PROGRESS NOTES
Kevin Clifton  1970  53 y.o.    Reason for call: Screening Colonoscopy & Carr's Esophagus   Prep prescribed: Nulytley  Prep instructions reviewed with patient and sent to patient via [employer]  Is the patient currently on any injectable medications for weight loss or diabetes? Yes  Clearance needed? Yes  If yes, what clearance is needed? Cardiology & Blood Thinner   Clearance has been requested from: Dr Kern   The patient has been scheduled for: EGD & Colonoscopy  After your procedure, you will be contacted with results. Please confirm the best phone # to reach the patient: [phone]   Family history of colon cancer? No  If yes, indicate relative: N/A   Family History   Problem Relation Age of Onset    Diabetes Mother     Early death Mother     No Known Problems Father      Past Medical History:   Diagnosis Date    Abdominal bloating     Abnormal ECG     Anemia     Arrhythmia     Asthma     Atrial fibrillation     Carr's esophagus     Benign essential hypertension     CHF (congestive heart failure)     Coronary artery disease     DDD (degenerative disc disease), cervical     Depression     Diabetes     Diabetes mellitus type 2, noninsulin dependent     Elevated liver enzymes     GERD (gastroesophageal reflux disease)     Heart murmur     Heart valve disease     Heartburn     High cholesterol     HLD (hyperlipidemia)     HTN (hypertension)     Hypertrophic obstructive cardiomyopathy 11/28/2021    LLQ abdominal pain     Nosebleed     Paroxysmal atrial fibrillation 05/31/2022    Sleep apnea      No Known Allergies  Past Surgical History:   Procedure Laterality Date    ABLATION OF DYSRHYTHMIC FOCUS      CARDIAC CATHETERIZATION      CARDIAC SURGERY  2019    CARDIAC VALVE REPLACEMENT      COLONOSCOPY  01/11/2019    Dr. Olivera    ENDOSCOPY  01/11/2019    Dr. Olivera    MITRAL VALVE REPLACEMENT       Social History     Socioeconomic History    Marital status:    Tobacco Use    Smoking status: Never      Passive exposure: Past    Smokeless tobacco: Never   Vaping Use    Vaping Use: Never used   Substance and Sexual Activity    Alcohol use: Yes     Comment: Certain occasions    Drug use: Not Currently    Sexual activity: Yes     Partners: Female     Birth control/protection: Same-sex partner       Current Outpatient Medications:     aspirin 81 MG EC tablet, Daily., Disp: , Rfl:     atorvastatin (LIPITOR) 20 MG tablet, Take 1 tablet by mouth Every Night., Disp: 90 tablet, Rfl: 1    buPROPion SR (WELLBUTRIN SR) 150 MG 12 hr tablet, Take 2 tablets by mouth 2 (Two) Times a Day., Disp: , Rfl:     carvedilol (COREG) 25 MG tablet, Take 1 tablet by mouth 2 (Two) Times a Day With Meals., Disp: , Rfl:     cetirizine (zyrTEC) 10 MG tablet, Take 1 tablet by mouth Daily., Disp: , Rfl:     Dulaglutide (Trulicity) 0.75 MG/0.5ML solution pen-injector, Inject 0.75 mg under the skin into the appropriate area as directed 1 (One) Time Per Week., Disp: 6 mL, Rfl: 0    lisinopril (PRINIVIL,ZESTRIL) 10 MG tablet, Take 1 tablet by mouth Daily., Disp: 90 tablet, Rfl: 3    melatonin 3 MG tablet, melatonin 3 mg oral tablet take 1 tablet by oral route once a day (at bedtime)   Suspended, Disp: , Rfl:     metFORMIN (GLUCOPHAGE) 500 MG tablet, Take 1 tablet by mouth 2 (Two) Times a Day With Meals., Disp: , Rfl:     montelukast (SINGULAIR) 10 MG tablet, Singulair 10 mg oral tablet take 1 tablet (10 mg) by oral route once daily in the evening   Suspended, Disp: , Rfl:     pantoprazole (PROTONIX) 20 MG EC tablet, , Disp: , Rfl:     sildenafil (VIAGRA) 100 MG tablet, TAKE ONE TABLET BY MOUTH AS NEEDED FOR ERECTILE DYSFUNCTION, Disp: 30 tablet, Rfl: 1    warfarin (COUMADIN) 3 MG tablet, TAKE ONE TABLET DAILY OR AS DIRECTED, Disp: 90 tablet, Rfl: 3    warfarin (COUMADIN) 4 MG tablet, TAKE ONE TABLET DAILY OR AS DIRECTED, Disp: 90 tablet, Rfl: 3    warfarin (COUMADIN) 5 MG tablet, TAKE ONE TABLET DAILY OR AS DIRECTED, Disp: 90 tablet, Rfl: 3    zolpidem  (AMBIEN) 5 MG tablet, TAKE ONE TABLET BY MOUTH EVERY NIGHT AT BEDTIME AS NEEDED FOR SLEEP, Disp: 90 tablet, Rfl: 1

## 2023-12-12 NOTE — TELEPHONE ENCOUNTER
Moderate risk  The patient will need a Lovenox bridge. Hold Warfarin for 3 days pre operatively, take Lovenox 1mg/kg BID for 2 days prior to procedure. Restart Warfarin the next day after the procedures.

## 2023-12-12 NOTE — TELEPHONE ENCOUNTER
Procedure: Colonoscopy and/or EGD     Med Directive: Warfarin     PMH: mechanical MVR, hypertrophic obstructive cardiomyopathy, PAF, HTN     Last Seen: 1/4/23

## 2023-12-12 NOTE — TELEPHONE ENCOUNTER
Blood Thinner/Cardiac Clearance Request                  2023      Dear Dr. Kern,    Patient Name: Kevin Clifton  : 1970    This patient is waiting to have a Colonoscopy and/or Esophagogastroduodenoscopy which I will perform at [facility] on _24_. Our records indicate this patient is currently taking _COUMADIN_. This procedure requires the patient to suspend their Anticoagulant medication prior to surgery. Please respond to this request noting your recommendations. You may contact our office at [phone] Option 2 with any questions. I appreciate your prompt response in this matter. Please return this form to our office as soon as possible to [phone].    ____ I approve my patient to stop taking their Anticoagulant Therapy medication __4_ days prior to the scheduled procedure.    ____ I do NOT approve my patient to stop taking their Anticoagulant Therapy medication at this time.    ____ I approve my patient from a cardiac standpoint.    ____ I do NOT approve my patient from a cardiac standpoint at this time.        Approving physician name (please print): _____________________________________________      Approving physician signature: ________________________________ Date:________________    Sincerely,    [facility]  Dr. Olivera's Office    *Please fax approval or denial to our office as soon as possible.

## 2023-12-13 RX ORDER — ENOXAPARIN SODIUM 100 MG/ML
100 INJECTION SUBCUTANEOUS EVERY 12 HOURS SCHEDULED
Qty: 4 ML | Refills: 0 | Status: SHIPPED | OUTPATIENT
Start: 2023-12-13

## 2023-12-13 NOTE — TELEPHONE ENCOUNTER
EGD/Colonoscopy Scheduled: 1.31.24    Rec'd Cardiac/Blood Thinner CLEARANCE. I will notify patient closer to his procedure date.     Postponing Patient Call until 1.10.24

## 2024-01-12 ENCOUNTER — TELEPHONE (OUTPATIENT)
Dept: GASTROENTEROLOGY | Facility: CLINIC | Age: 54
End: 2024-01-12
Payer: OTHER GOVERNMENT

## 2024-01-19 ENCOUNTER — LAB (OUTPATIENT)
Dept: LAB | Facility: HOSPITAL | Age: 54
End: 2024-01-19
Payer: OTHER GOVERNMENT

## 2024-01-19 DIAGNOSIS — Z95.2 HX OF MITRAL VALVE REPLACEMENT WITH MECHANICAL VALVE: ICD-10-CM

## 2024-01-19 LAB
INR PPP: 2.88 (ref 0.86–1.15)
PROTHROMBIN TIME: 30.6 SECONDS (ref 11.8–14.9)

## 2024-01-19 PROCEDURE — 85610 PROTHROMBIN TIME: CPT

## 2024-01-19 PROCEDURE — 36415 COLL VENOUS BLD VENIPUNCTURE: CPT

## 2024-01-22 ENCOUNTER — ANTICOAGULATION VISIT (OUTPATIENT)
Dept: CARDIOLOGY | Facility: CLINIC | Age: 54
End: 2024-01-22
Payer: OTHER GOVERNMENT

## 2024-01-22 DIAGNOSIS — Z95.2 HX OF MITRAL VALVE REPLACEMENT WITH MECHANICAL VALVE: Primary | ICD-10-CM

## 2024-01-22 NOTE — PROGRESS NOTES
Lab Results   Component Value Date    INR 2.88 (H) 01/19/2024    INR 2.89 (H) 12/06/2023    INR 2.43 (H) 11/17/2023    PROTIME 30.6 (H) 01/19/2024    PROTIME 30.7 (H) 12/06/2023    PROTIME 26.9 (H) 11/17/2023     Dx: hx of mitral valve replacmenet with mechanical valve  Pt taking 8/8/9  Range: 2.5-3.5  [facility]

## 2024-01-22 NOTE — PRE-PROCEDURE INSTRUCTIONS
Arrival time of . 0830    Must have a  over 18 years old for transportation post prodecure, as well as photo ID and insurance card.    Education provided on laxative administration; bowel prep to be taken in two doses. Reviewed diet instructions for day prior to procedure. Only plain, unflavored water after midnight until two hours prior to arrival time.    Take any morning medications of the day and the procedure as well as the night before. Bring all prescribed medications or a list of medications and inhalers to the hospital on the morning of the procedure.    Patient verbalized understanding of instructions

## 2024-01-30 ENCOUNTER — ANESTHESIA EVENT (OUTPATIENT)
Dept: GASTROENTEROLOGY | Facility: HOSPITAL | Age: 54
End: 2024-01-30
Payer: OTHER GOVERNMENT

## 2024-01-30 NOTE — ANESTHESIA PREPROCEDURE EVALUATION
Anesthesia Evaluation     NPO Solid Status: > 8 hours  NPO Liquid Status: > 2 hours           Airway   Mallampati: I  TM distance: >3 FB  No difficulty expected  Dental - normal exam     Pulmonary - normal exam   (+) asthma (very mild, no meds, breathing at baseline),sleep apnea  Cardiovascular   Exercise tolerance: good (4-7 METS)    PT is on anticoagulation therapy    (+) hypertension, valvular problems/murmurs (s/p MVR with mechanical valve, target INR 2.5-3.5) murmur, CAD, dysrhythmias Atrial Fib, Paroxysmal Atrial Fib, CHF , hyperlipidemia      Neuro/Psych  (+) psychiatric history Depression  GI/Hepatic/Renal/Endo    (+) GERD, diabetes mellitus type 2    Musculoskeletal     Abdominal    Substance History      OB/GYN          Other   arthritis,     ROS/Med Hx Other: Last dose warfarin 3 days ago    PT/INR    Last dose Trulicity - 3 weeks ago - 1/09      EKG 01/04/23: HR 72, SR, LBBB    ECHO 07/16/22:   ·Calculated left ventricular EF = 64% Estimated left ventricular EF was in agreement with the calculated left ventricular EF.  ·Left ventricular wall thickness is consistent with moderate concentric hypertrophy.  ·Left atrial dilation moderate  ·Left ventricular diastolic function is consistent with (grade Ia w/high LAP) impaired relaxation.  ·There is a bioprosthetic mitral valve present.  ·Evidence of increased LV filling pressures                        Anesthesia Plan    ASA 3     general   total IV anesthesia    Anesthetic plan, risks, benefits, and alternatives have been provided, discussed and informed consent has been obtained with: spouse/significant other and patient.  Pre-procedure education provided  Plan discussed with CRNA.        CODE STATUS:

## 2024-01-31 ENCOUNTER — HOSPITAL ENCOUNTER (OUTPATIENT)
Facility: HOSPITAL | Age: 54
Setting detail: HOSPITAL OUTPATIENT SURGERY
Discharge: HOME | End: 2024-01-31
Attending: INTERNAL MEDICINE | Admitting: INTERNAL MEDICINE
Payer: OTHER GOVERNMENT

## 2024-01-31 ENCOUNTER — ANTICOAGULATION VISIT (OUTPATIENT)
Dept: CARDIOLOGY | Facility: CLINIC | Age: 54
End: 2024-01-31
Payer: OTHER GOVERNMENT

## 2024-01-31 ENCOUNTER — ANESTHESIA (OUTPATIENT)
Dept: GASTROENTEROLOGY | Facility: HOSPITAL | Age: 54
End: 2024-01-31
Payer: OTHER GOVERNMENT

## 2024-01-31 VITALS
SYSTOLIC BLOOD PRESSURE: 144 MMHG | RESPIRATION RATE: 16 BRPM | BODY MASS INDEX: 34.29 KG/M2 | WEIGHT: 238.98 LBS | OXYGEN SATURATION: 100 % | TEMPERATURE: 97 F | DIASTOLIC BLOOD PRESSURE: 88 MMHG | HEART RATE: 80 BPM

## 2024-01-31 DIAGNOSIS — K22.70 BARRETT'S ESOPHAGUS WITHOUT DYSPLASIA: ICD-10-CM

## 2024-01-31 DIAGNOSIS — Z95.2 HX OF MITRAL VALVE REPLACEMENT WITH MECHANICAL VALVE: Primary | ICD-10-CM

## 2024-01-31 DIAGNOSIS — Z12.11 ENCOUNTER FOR SCREENING FOR MALIGNANT NEOPLASM OF COLON: ICD-10-CM

## 2024-01-31 LAB
GLUCOSE BLDC GLUCOMTR-MCNC: 97 MG/DL (ref 70–99)
INR PPP: 1.48 (ref 0.86–1.15)
PROTHROMBIN TIME: 18.2 SECONDS (ref 11.8–14.9)

## 2024-01-31 PROCEDURE — 82948 REAGENT STRIP/BLOOD GLUCOSE: CPT

## 2024-01-31 PROCEDURE — 88305 TISSUE EXAM BY PATHOLOGIST: CPT | Performed by: INTERNAL MEDICINE

## 2024-01-31 PROCEDURE — 43239 EGD BIOPSY SINGLE/MULTIPLE: CPT | Performed by: INTERNAL MEDICINE

## 2024-01-31 PROCEDURE — 25810000003 LACTATED RINGERS PER 1000 ML

## 2024-01-31 PROCEDURE — 45385 COLONOSCOPY W/LESION REMOVAL: CPT | Performed by: INTERNAL MEDICINE

## 2024-01-31 PROCEDURE — 85610 PROTHROMBIN TIME: CPT

## 2024-01-31 PROCEDURE — 25010000002 PROPOFOL 10 MG/ML EMULSION: Performed by: NURSE ANESTHETIST, CERTIFIED REGISTERED

## 2024-01-31 PROCEDURE — 45380 COLONOSCOPY AND BIOPSY: CPT | Performed by: INTERNAL MEDICINE

## 2024-01-31 RX ORDER — LIDOCAINE HYDROCHLORIDE 20 MG/ML
INJECTION, SOLUTION EPIDURAL; INFILTRATION; INTRACAUDAL; PERINEURAL AS NEEDED
Status: DISCONTINUED | OUTPATIENT
Start: 2024-01-31 | End: 2024-01-31 | Stop reason: SURG

## 2024-01-31 RX ORDER — SODIUM CHLORIDE, SODIUM LACTATE, POTASSIUM CHLORIDE, CALCIUM CHLORIDE 600; 310; 30; 20 MG/100ML; MG/100ML; MG/100ML; MG/100ML
30 INJECTION, SOLUTION INTRAVENOUS CONTINUOUS
Status: DISCONTINUED | OUTPATIENT
Start: 2024-01-31 | End: 2024-01-31 | Stop reason: HOSPADM

## 2024-01-31 RX ORDER — PROPOFOL 10 MG/ML
VIAL (ML) INTRAVENOUS AS NEEDED
Status: DISCONTINUED | OUTPATIENT
Start: 2024-01-31 | End: 2024-01-31 | Stop reason: SURG

## 2024-01-31 RX ADMIN — LIDOCAINE HYDROCHLORIDE 100 MG: 20 INJECTION, SOLUTION EPIDURAL; INFILTRATION; INTRACAUDAL; PERINEURAL at 10:08

## 2024-01-31 RX ADMIN — LIDOCAINE HYDROCHLORIDE 100 MG: 20 INJECTION, SOLUTION EPIDURAL; INFILTRATION; INTRACAUDAL; PERINEURAL at 10:28

## 2024-01-31 RX ADMIN — PROPOFOL 150 MCG/KG/MIN: 10 INJECTION, EMULSION INTRAVENOUS at 10:09

## 2024-01-31 RX ADMIN — PROPOFOL 150 MG: 10 INJECTION, EMULSION INTRAVENOUS at 10:08

## 2024-01-31 RX ADMIN — SODIUM CHLORIDE, POTASSIUM CHLORIDE, SODIUM LACTATE AND CALCIUM CHLORIDE: 600; 310; 30; 20 INJECTION, SOLUTION INTRAVENOUS at 09:20

## 2024-01-31 RX ADMIN — PROPOFOL 50 MG: 10 INJECTION, EMULSION INTRAVENOUS at 10:32

## 2024-01-31 NOTE — ANESTHESIA POSTPROCEDURE EVALUATION
Patient: Keivn Clifton    Procedure Summary       Date: 01/31/24 Room / Location: [facility] ENDOSCOPY 3 / [facility] END[facility]COPY    Anesthesia Start: 1007 Anesthesia Stop: 1042    Procedures:       ESOPHAGOGASTRODUODENOSCOPY WITH BIOPSIES      COLONOSCOPY WITH POLYPECTOMIES Diagnosis:       Encounter for screening for malignant neoplasm of colon      Carr's esophagus without dysplasia      (Encounter for screening for malignant neoplasm of colon [Z12.11])      (Carr's esophagus without dysplasia [K22.70])    Surgeons: Kiet Olivera MD Provider: Angy Hong CRNA    Anesthesia Type: general ASA Status: 3            Anesthesia Type: general    Vitals  Vitals Value Taken Time   /73 01/31/24 1042   Temp 36.1 °C (97 °F) 01/31/24 1042   Pulse 96 01/31/24 1043   Resp 20 01/31/24 1042   SpO2 96 % 01/31/24 1043   Vitals shown include unfiled device data.        Post Anesthesia Care and Evaluation    Patient location during evaluation: PHASE II  Level of consciousness: awake (acceptable)  Pain management: satisfactory to patient    Airway patency: patent  Anesthetic complications: No anesthetic complications    Cardiovascular status: acceptable  Respiratory status: acceptable    Comments: Per chart review

## 2024-01-31 NOTE — NURSING NOTE
AFTER WAITING ON PT/INR RESULTS, PHYSICIAN DECIDED TO TAKE PATIENT TO PROCEDURE PRIOR TO OBTAINING LAB RESULTS. PATIENT CONCERNED ABOUT RESULTS OF INR AND EFFECTS ON PROCEDURE. THIS RN EXPLAINED TO PATIENT THAT NURSING STAFF WOULD CONTINUE TO WATCH FOR RESULTS AND INFORM/NOTIFY AS INDICATED.

## 2024-01-31 NOTE — H&P
Pre Procedure History & Physical    Chief Complaint:   History of Carr's and screening for colon polyp    Subjective     HPI:   History of Carr's and screening for colon polyps    Past Medical History:   Past Medical History:   Diagnosis Date    Abdominal bloating     Abnormal ECG     Anemia     Arrhythmia     Asthma     Atrial fibrillation     Carr's esophagus     Benign essential hypertension     CHF (congestive heart failure)     Coronary artery disease     DDD (degenerative disc disease), cervical     Depression     Diabetes     Diabetes mellitus type 2, noninsulin dependent     Elevated liver enzymes     GERD (gastroesophageal reflux disease)     Heart murmur     Heart valve disease     Heartburn     High cholesterol     HLD (hyperlipidemia)     HTN (hypertension)     Hypertrophic obstructive cardiomyopathy 11/28/2021    LLQ abdominal pain     Nosebleed     Paroxysmal atrial fibrillation 05/31/2022    Sleep apnea        Past Surgical History:  Past Surgical History:   Procedure Laterality Date    ABLATION OF DYSRHYTHMIC FOCUS      CARDIAC CATHETERIZATION      CARDIAC SURGERY  2019    CARDIAC VALVE REPLACEMENT      COLONOSCOPY  01/11/2019    Dr. Olivera    ENDOSCOPY  01/11/2019    Dr. Olivera    MITRAL VALVE REPLACEMENT         Family History:  Family History   Problem Relation Age of Onset    Diabetes Mother     Early death Mother     No Known Problems Father        Social History:   reports that he has never smoked. He has been exposed to tobacco smoke. He has never used smokeless tobacco. He reports current alcohol use. He reports that he does not currently use drugs.    Medications:   Medications Prior to Admission   Medication Sig Dispense Refill Last Dose    aspirin 81 MG EC tablet Daily.       atorvastatin (LIPITOR) 20 MG tablet Take 1 tablet by mouth Every Night. 90 tablet 1     buPROPion SR (WELLBUTRIN SR) 150 MG 12 hr tablet Take 2 tablets by mouth 2 (Two) Times a Day.       carvedilol (COREG)  25 MG tablet Take 1 tablet by mouth 2 (Two) Times a Day With Meals.       cetirizine (zyrTEC) 10 MG tablet Take 1 tablet by mouth Daily.       Dulaglutide (Trulicity) 0.75 MG/0.5ML solution pen-injector Inject 0.75 mg under the skin into the appropriate area as directed 1 (One) Time Per Week. 6 mL 0     Enoxaparin Sodium (LOVENOX) 100 MG/ML solution prefilled syringe syringe Inject 1 mL under the skin into the appropriate area as directed Every 12 (Twelve) Hours. 4 mL 0     lisinopril (PRINIVIL,ZESTRIL) 10 MG tablet Take 1 tablet by mouth Daily. 90 tablet 3     melatonin 3 MG tablet melatonin 3 mg oral tablet take 1 tablet by oral route once a day (at bedtime)   Suspended       metFORMIN (GLUCOPHAGE) 500 MG tablet Take 1 tablet by mouth 2 (Two) Times a Day With Meals.       montelukast (SINGULAIR) 10 MG tablet Singulair 10 mg oral tablet take 1 tablet (10 mg) by oral route once daily in the evening   Suspended       pantoprazole (PROTONIX) 20 MG EC tablet        polyethylene glycol (GoLYTELY) 236 g solution Take per office instructions 4000 mL 0     sildenafil (VIAGRA) 100 MG tablet TAKE ONE TABLET BY MOUTH AS NEEDED FOR ERECTILE DYSFUNCTION 30 tablet 1     warfarin (COUMADIN) 3 MG tablet TAKE ONE TABLET DAILY OR AS DIRECTED 90 tablet 3     warfarin (COUMADIN) 4 MG tablet TAKE ONE TABLET DAILY OR AS DIRECTED 90 tablet 3     warfarin (COUMADIN) 5 MG tablet TAKE ONE TABLET DAILY OR AS DIRECTED 90 tablet 3     zolpidem (AMBIEN) 5 MG tablet TAKE ONE TABLET BY MOUTH EVERY NIGHT AT BEDTIME AS NEEDED FOR SLEEP 90 tablet 1        Allergies:  Patient has no known allergies.        Objective     Weight 108 kg (238 lb 15.7 oz).    Physical Exam   Constitutional: Pt is oriented to person, place, and time and well-developed, well-nourished, and in no distress.   Mouth/Throat: Oropharynx is clear and moist.   Neck: Normal range of motion.   Cardiovascular: Normal rate, regular rhythm and normal heart sounds.    Pulmonary/Chest:  Effort normal and breath sounds normal.   Abdominal: Soft. Nontender  Skin: Skin is warm and dry.   Psychiatric: Mood, memory, affect and judgment normal.     Assessment & Plan     Diagnosis:  History of Carr's and screening for colon polyps    Anticipated Surgical Procedure:  EGD colonoscopy    The risks, benefits, and alternatives of this procedure have been discussed with the patient or the responsible party- the patient understands and agrees to proceed.

## 2024-01-31 NOTE — PROGRESS NOTES
Lab Results   Component Value Date    INR 1.48 (H) 01/31/2024    INR 2.88 (H) 01/19/2024    INR 2.89 (H) 12/06/2023    PROTIME 18.2 (H) 01/31/2024    PROTIME 30.6 (H) 01/19/2024    PROTIME 30.7 (H) 12/06/2023    Hx of mitral valve replacement with mechanical valve   Range 2.5-3.5  Pt held warfarin for colonoscopy today.  [facility]    Pt is aware of results and instructions.

## 2024-02-03 LAB
CYTO UR: NORMAL
LAB AP CASE REPORT: NORMAL
LAB AP CLINICAL INFORMATION: NORMAL
PATH REPORT.FINAL DX SPEC: NORMAL
PATH REPORT.GROSS SPEC: NORMAL

## 2024-02-05 ENCOUNTER — ANTICOAGULATION VISIT (OUTPATIENT)
Dept: CARDIOLOGY | Facility: CLINIC | Age: 54
End: 2024-02-05
Payer: OTHER GOVERNMENT

## 2024-02-05 ENCOUNTER — LAB (OUTPATIENT)
Dept: LAB | Facility: HOSPITAL | Age: 54
End: 2024-02-05
Payer: OTHER GOVERNMENT

## 2024-02-05 DIAGNOSIS — Z95.2 HX OF MITRAL VALVE REPLACEMENT WITH MECHANICAL VALVE: Primary | ICD-10-CM

## 2024-02-05 DIAGNOSIS — Z95.2 HX OF MITRAL VALVE REPLACEMENT WITH MECHANICAL VALVE: ICD-10-CM

## 2024-02-05 LAB
INR PPP: 1.41 (ref 0.86–1.15)
PROTHROMBIN TIME: 17.5 SECONDS (ref 11.8–14.9)

## 2024-02-05 PROCEDURE — 36415 COLL VENOUS BLD VENIPUNCTURE: CPT

## 2024-02-05 PROCEDURE — 85610 PROTHROMBIN TIME: CPT

## 2024-02-05 NOTE — PROGRESS NOTES
Lab Results   Component Value Date    INR 1.41 (H) 02/05/2024    INR 1.48 (H) 01/31/2024    INR 2.88 (H) 01/19/2024    PROTIME 17.5 (H) 02/05/2024    PROTIME 18.2 (H) 01/31/2024    PROTIME 30.6 (H) 01/19/2024    Hx of mitral valve replacement with mechanical valve   Range 2.5-3.5  10/9/9/8  [facility]    Pt is aware of results and instructions.

## 2024-02-07 ENCOUNTER — ANTICOAGULATION VISIT (OUTPATIENT)
Dept: CARDIOLOGY | Facility: CLINIC | Age: 54
End: 2024-02-07
Payer: OTHER GOVERNMENT

## 2024-02-07 ENCOUNTER — LAB (OUTPATIENT)
Dept: LAB | Facility: HOSPITAL | Age: 54
End: 2024-02-07
Payer: OTHER GOVERNMENT

## 2024-02-07 DIAGNOSIS — Z95.2 HX OF MITRAL VALVE REPLACEMENT WITH MECHANICAL VALVE: Primary | ICD-10-CM

## 2024-02-07 DIAGNOSIS — Z95.2 HX OF MITRAL VALVE REPLACEMENT WITH MECHANICAL VALVE: ICD-10-CM

## 2024-02-07 LAB
INR PPP: 1.71 (ref 0.86–1.15)
PROTHROMBIN TIME: 20.4 SECONDS (ref 11.8–14.9)

## 2024-02-07 PROCEDURE — 36415 COLL VENOUS BLD VENIPUNCTURE: CPT

## 2024-02-07 PROCEDURE — 85610 PROTHROMBIN TIME: CPT

## 2024-02-07 NOTE — PROGRESS NOTES
Lab Results   Component Value Date    INR 1.71 (H) 02/07/2024    INR 1.41 (H) 02/05/2024    INR 1.48 (H) 01/31/2024    PROTIME 20.4 (H) 02/07/2024    PROTIME 17.5 (H) 02/05/2024    PROTIME 18.2 (H) 01/31/2024    Hx of mitral valve replacement with mechanical valve   Range 2.5-3.5  10/9/9/8  [facility]    I will have pt schedule a f/u as well.    Pt is aware of results and instructions.

## 2024-02-12 ENCOUNTER — TELEPHONE (OUTPATIENT)
Dept: CARDIOLOGY | Facility: CLINIC | Age: 54
End: 2024-02-12

## 2024-02-12 NOTE — TELEPHONE ENCOUNTER
[facility] received a fax that requires your attention. The document has been indexed to the patient’s chart for your review.      Reason for sending: FAX FOR DR. CUEVA    Documents Description: [employer] AUTH FOR DR. [employer], AUTH # [id number] FROM 2/1/24 TO 1/31/25    Name of Sender: PATRICE    Date Indexed: 2/7/24

## 2024-02-13 ENCOUNTER — ANTICOAGULATION VISIT (OUTPATIENT)
Dept: CARDIOLOGY | Facility: CLINIC | Age: 54
End: 2024-02-13
Payer: OTHER GOVERNMENT

## 2024-02-13 ENCOUNTER — LAB (OUTPATIENT)
Dept: LAB | Facility: HOSPITAL | Age: 54
End: 2024-02-13
Payer: OTHER GOVERNMENT

## 2024-02-13 DIAGNOSIS — Z95.2 HX OF MITRAL VALVE REPLACEMENT WITH MECHANICAL VALVE: Primary | ICD-10-CM

## 2024-02-13 DIAGNOSIS — Z95.2 HX OF MITRAL VALVE REPLACEMENT WITH MECHANICAL VALVE: ICD-10-CM

## 2024-02-13 LAB
INR PPP: 3.27 (ref 0.86–1.15)
PROTHROMBIN TIME: 33.8 SECONDS (ref 11.8–14.9)

## 2024-02-13 PROCEDURE — 36415 COLL VENOUS BLD VENIPUNCTURE: CPT

## 2024-02-13 PROCEDURE — 85610 PROTHROMBIN TIME: CPT

## 2024-03-04 ENCOUNTER — ANTICOAGULATION VISIT (OUTPATIENT)
Dept: CARDIOLOGY | Facility: CLINIC | Age: 54
End: 2024-03-04
Payer: OTHER GOVERNMENT

## 2024-03-04 ENCOUNTER — LAB (OUTPATIENT)
Dept: LAB | Facility: HOSPITAL | Age: 54
End: 2024-03-04
Payer: OTHER GOVERNMENT

## 2024-03-04 DIAGNOSIS — Z95.2 HX OF MITRAL VALVE REPLACEMENT WITH MECHANICAL VALVE: Primary | ICD-10-CM

## 2024-03-04 DIAGNOSIS — Z95.2 HX OF MITRAL VALVE REPLACEMENT WITH MECHANICAL VALVE: ICD-10-CM

## 2024-03-04 LAB
INR PPP: 4.49 (ref 0.86–1.15)
PROTHROMBIN TIME: 43.3 SECONDS (ref 11.8–14.9)

## 2024-03-04 PROCEDURE — 85610 PROTHROMBIN TIME: CPT

## 2024-03-04 PROCEDURE — 36415 COLL VENOUS BLD VENIPUNCTURE: CPT

## 2024-03-04 NOTE — PROGRESS NOTES
Level is too high.  Please verify he is not having any bleeding issues.  Hold dose today, and recheck INR tomorrow.

## 2024-03-04 NOTE — PROGRESS NOTES
Lab Results   Component Value Date    INR 4.49 (H) 03/04/2024    INR 3.27 (H) 02/13/2024    INR 1.71 (H) 02/07/2024    PROTIME 43.3 (H) 03/04/2024    PROTIME 33.8 (H) 02/13/2024    PROTIME 20.4 (H) 02/07/2024    Hx of mitral valve replacement with mechanical valve   Range 2.5-3.5  10/9/10/10/9  [facility]    Pt is aware of results and instructions.

## 2024-03-05 ENCOUNTER — LAB (OUTPATIENT)
Dept: LAB | Facility: HOSPITAL | Age: 54
End: 2024-03-05
Payer: OTHER GOVERNMENT

## 2024-03-05 DIAGNOSIS — Z95.2 HX OF MITRAL VALVE REPLACEMENT WITH MECHANICAL VALVE: ICD-10-CM

## 2024-03-05 LAB
INR PPP: 3.45 (ref 0.86–1.15)
PROTHROMBIN TIME: 35.3 SECONDS (ref 11.8–14.9)

## 2024-03-05 PROCEDURE — 36415 COLL VENOUS BLD VENIPUNCTURE: CPT

## 2024-03-05 PROCEDURE — 85610 PROTHROMBIN TIME: CPT

## 2024-03-06 ENCOUNTER — ANTICOAGULATION VISIT (OUTPATIENT)
Dept: CARDIOLOGY | Facility: CLINIC | Age: 54
End: 2024-03-06
Payer: OTHER GOVERNMENT

## 2024-03-06 DIAGNOSIS — Z95.2 HX OF MITRAL VALVE REPLACEMENT WITH MECHANICAL VALVE: Primary | ICD-10-CM

## 2024-03-06 NOTE — PROGRESS NOTES
Lab Results   Component Value Date    INR 3.45 (H) 03/05/2024    INR 4.49 (H) 03/04/2024    INR 3.27 (H) 02/13/2024    PROTIME 35.3 (H) 03/05/2024    PROTIME 43.3 (H) 03/04/2024    PROTIME 33.8 (H) 02/13/2024    Hx of mitral valve replacement with mechanical valve   Range 2.5-3.5  Held Monday, took 9 mg yesterday  [facility]    Pt is aware of results and instructions.

## 2024-03-08 ENCOUNTER — ANTICOAGULATION VISIT (OUTPATIENT)
Dept: CARDIOLOGY | Facility: CLINIC | Age: 54
End: 2024-03-08
Payer: OTHER GOVERNMENT

## 2024-03-08 ENCOUNTER — LAB (OUTPATIENT)
Dept: LAB | Facility: HOSPITAL | Age: 54
End: 2024-03-08
Payer: OTHER GOVERNMENT

## 2024-03-08 DIAGNOSIS — Z95.2 HX OF MITRAL VALVE REPLACEMENT WITH MECHANICAL VALVE: ICD-10-CM

## 2024-03-08 DIAGNOSIS — Z95.2 HX OF MITRAL VALVE REPLACEMENT WITH MECHANICAL VALVE: Primary | ICD-10-CM

## 2024-03-08 LAB
INR PPP: 2.67 (ref 0.86–1.15)
PROTHROMBIN TIME: 28.9 SECONDS (ref 11.8–14.9)

## 2024-03-08 PROCEDURE — 85610 PROTHROMBIN TIME: CPT

## 2024-03-08 PROCEDURE — 36415 COLL VENOUS BLD VENIPUNCTURE: CPT

## 2024-03-08 NOTE — PROGRESS NOTES
Lab Results   Component Value Date    INR 2.67 (H) 03/08/2024    INR 3.45 (H) 03/05/2024    INR 4.49 (H) 03/04/2024    PROTIME 28.9 (H) 03/08/2024    PROTIME 35.3 (H) 03/05/2024    PROTIME 43.3 (H) 03/04/2024    Hx of mitral valve replacement with mechanical valve   Range 2.5-3.5  9 mg  [facility]    Pt is aware of results and instructions.

## 2024-03-13 ENCOUNTER — OFFICE VISIT (OUTPATIENT)
Dept: CARDIOLOGY | Facility: CLINIC | Age: 54
End: 2024-03-13
Payer: OTHER GOVERNMENT

## 2024-03-13 VITALS
DIASTOLIC BLOOD PRESSURE: 95 MMHG | BODY MASS INDEX: 34.93 KG/M2 | HEART RATE: 74 BPM | SYSTOLIC BLOOD PRESSURE: 151 MMHG | HEIGHT: 70 IN | WEIGHT: 244 LBS

## 2024-03-13 DIAGNOSIS — I48.0 PAROXYSMAL ATRIAL FIBRILLATION: ICD-10-CM

## 2024-03-13 DIAGNOSIS — I10 PRIMARY HYPERTENSION: ICD-10-CM

## 2024-03-13 DIAGNOSIS — Z95.2 HX OF MITRAL VALVE REPLACEMENT WITH MECHANICAL VALVE: ICD-10-CM

## 2024-03-13 DIAGNOSIS — I42.1 HOCM (HYPERTROPHIC OBSTRUCTIVE CARDIOMYOPATHY): Primary | ICD-10-CM

## 2024-03-13 PROCEDURE — 99214 OFFICE O/P EST MOD 30 MIN: CPT | Performed by: INTERNAL MEDICINE

## 2024-03-13 NOTE — ASSESSMENT & PLAN NOTE
Patient with no symptomatic recurrent episodes but recorded spells on his Apple Watch will get a 48-hour monitor to evaluate dysrhythmia trends continue with his Coreg 25 twice daily patient is on Coumadin for CVA prevention

## 2024-03-13 NOTE — PROGRESS NOTES
Chief Complaint  Follow-up, Hx of mitral valve replacement with mechanical valve, Atrial Fibrillation, Hypertension, and Hyperlipidemia    Subjective    Patient reports stable breathing ability and no problems with increased edema or tachycardic symptoms.  His Apple Watch has reported that he has been in A-fib about 2% of time since last visit but he does not report for any symptomatic problems or noticing any tachycardic symptoms  Past Medical History:   Diagnosis Date    Abdominal bloating     Abnormal ECG     Anemia     Arrhythmia     Asthma     Atrial fibrillation     Carr's esophagus     Benign essential hypertension     CHF (congestive heart failure)     Coronary artery disease     DDD (degenerative disc disease), cervical     Depression     Diabetes     Diabetes mellitus type 2, noninsulin dependent     Elevated liver enzymes     GERD (gastroesophageal reflux disease)     Heart murmur     Heart valve disease     Heartburn     High cholesterol     HLD (hyperlipidemia)     HTN (hypertension)     Hypertrophic obstructive cardiomyopathy 11/28/2021    LLQ abdominal pain     Nosebleed     Paroxysmal atrial fibrillation 05/31/2022    Sleep apnea          Current Outpatient Medications:     aspirin 81 MG EC tablet, Daily., Disp: , Rfl:     atorvastatin (LIPITOR) 20 MG tablet, Take 1 tablet by mouth Every Night., Disp: 90 tablet, Rfl: 1    buPROPion SR (WELLBUTRIN SR) 150 MG 12 hr tablet, Take 2 tablets by mouth 2 (Two) Times a Day., Disp: , Rfl:     carvedilol (COREG) 25 MG tablet, Take 1 tablet by mouth 2 (Two) Times a Day With Meals., Disp: , Rfl:     cetirizine (zyrTEC) 10 MG tablet, Take 1 tablet by mouth Daily., Disp: , Rfl:     Dulaglutide (Trulicity) 0.75 MG/0.5ML solution pen-injector, Inject 0.75 mg under the skin into the appropriate area as directed 1 (One) Time Per Week., Disp: 6 mL, Rfl: 0    lisinopril (PRINIVIL,ZESTRIL) 10 MG tablet, Take 1 tablet by mouth Daily., Disp: 90 tablet, Rfl: 3     "melatonin 3 MG tablet, melatonin 3 mg oral tablet take 1 tablet by oral route once a day (at bedtime)   Suspended, Disp: , Rfl:     metFORMIN (GLUCOPHAGE) 500 MG tablet, Take 1 tablet by mouth 2 (Two) Times a Day With Meals., Disp: , Rfl:     montelukast (SINGULAIR) 10 MG tablet, Singulair 10 mg oral tablet take 1 tablet (10 mg) by oral route once daily in the evening   Suspended, Disp: , Rfl:     pantoprazole (PROTONIX) 20 MG EC tablet, , Disp: , Rfl:     sildenafil (VIAGRA) 100 MG tablet, TAKE ONE TABLET BY MOUTH AS NEEDED FOR ERECTILE DYSFUNCTION, Disp: 30 tablet, Rfl: 1    warfarin (COUMADIN) 3 MG tablet, TAKE ONE TABLET DAILY OR AS DIRECTED (Patient taking differently: Take 1 tablet by mouth Every Night. PRESCRIPTION SCHEDULE IS 8MG/8MG/9MG/8MG/8MG/9MG/8MG.....), Disp: 90 tablet, Rfl: 3    warfarin (COUMADIN) 4 MG tablet, TAKE ONE TABLET DAILY OR AS DIRECTED, Disp: 90 tablet, Rfl: 3    warfarin (COUMADIN) 5 MG tablet, TAKE ONE TABLET DAILY OR AS DIRECTED, Disp: 90 tablet, Rfl: 3    zolpidem (AMBIEN) 5 MG tablet, TAKE ONE TABLET BY MOUTH EVERY NIGHT AT BEDTIME AS NEEDED FOR SLEEP, Disp: 90 tablet, Rfl: 1    Medications Discontinued During This Encounter   Medication Reason    Enoxaparin Sodium (LOVENOX) 100 MG/ML solution prefilled syringe syringe      No Known Allergies     Social History     Tobacco Use    Smoking status: Never     Passive exposure: Past    Smokeless tobacco: Never   Vaping Use    Vaping status: Never Used   Substance Use Topics    Alcohol use: Yes     Comment: Certain occasions    Drug use: Not Currently       Family History   Problem Relation Age of Onset    Diabetes Mother     Early death Mother     No Known Problems Father         Objective     /95   Pulse 74   Ht 177.8 cm (70\")   Wt 111 kg (244 lb)   BMI 35.01 kg/m²       Physical Exam    General Appearance:   no acute distress  Alert and oriented x3  HENT:   lips not cyanotic  Atraumatic  Neck:  No jvd " "  supple  Respiratory:  no respiratory distress  normal breath sounds  no rales  Cardiovascular:  Regular rate and rhythm  no S3, no S4   no murmur normal mechanical heart sounds  no rub  Extremities  No cyanosis  lower extremity edema: Mild  Skin:   warm, dry  No rashes      Result Review :     No results found for: \"PROBNP\"  CMP          3/21/2023    08:09 11/17/2023    08:51   CMP   Glucose 111  89    BUN 6  9    Creatinine 1.13  1.11    EGFR 78.2  79.4    Sodium 136  138    Potassium 4.3  4.3    Chloride 101  105    Calcium 9.3  9.2    Total Protein 7.4  7.5    Albumin 4.2  4.2    Globulin 3.2  3.3    Total Bilirubin 0.5  0.7    Alkaline Phosphatase 47  42    AST (SGOT) 24  26    ALT (SGPT) 24  27    Albumin/Globulin Ratio 1.3  1.3    BUN/Creatinine Ratio 5.3  8.1    Anion Gap 10.0  8.5      CBC w/diff          3/21/2023    08:09 11/17/2023    08:51   CBC w/Diff   WBC 4.56  4.50    RBC 5.74  5.87    Hemoglobin 12.8  13.5    Hematocrit 42.3  44.5    MCV 73.7  75.8    MCH 22.3  23.0    MCHC 30.3  30.3    RDW 13.7  13.9    Platelets 287  271       Lab Results   Component Value Date    TSH 1.760 11/17/2023      Lab Results   Component Value Date    FREET4 1.6 02/11/2019      No results found for: \"DDIMERQUANT\"  No results found for: \"MG\"   No results found for: \"DIGOXIN\"   Lab Results   Component Value Date    TROPONINT <0.01 02/07/2019           Lipid Panel          3/21/2023    08:09 11/17/2023    08:51   Lipid Panel   Total Cholesterol 191  205    Triglycerides 80  99    HDL Cholesterol 43  45    VLDL Cholesterol 15  18    LDL Cholesterol  133  142    LDL/HDL Ratio 3.07  3.12      No results found for: \"POCTROP\"    Results for orders placed in visit on 07/16/22    Adult Transthoracic Echo Complete W/ Cont if Necessary Per Protocol    Interpretation Summary  · Calculated left ventricular EF = 64% Estimated left ventricular EF was in agreement with the calculated left ventricular EF.  · Left ventricular wall " thickness is consistent with moderate concentric hypertrophy.  · Left atrial dilation moderate  · Left ventricular diastolic function is consistent with (grade Ia w/high LAP) impaired relaxation.  · There is a bioprosthetic mitral valve present.  · Evidence of increased LV filling pressures                 Diagnoses and all orders for this visit:    1. HOCM (hypertrophic obstructive cardiomyopathy) (Primary)  Assessment & Plan:  Patient symptomatically stable from his breathing standpoint repeating echocardiogram to see if any change in hypertrophy blood pressure was mildly elevated today gave him blood pressure log to keep track to allow for further titration if needed for aggressive blood pressure control         Orders:  -     Holter Monitor - 48 Hour; Future  -     Adult Transthoracic Echo Complete W/ Cont if Necessary Per Protocol; Future    2. Paroxysmal atrial fibrillation  Assessment & Plan:  Patient with no symptomatic recurrent episodes but recorded spells on his Apple Watch will get a 48-hour monitor to evaluate dysrhythmia trends continue with his Coreg 25 twice daily patient is on Coumadin for CVA prevention      3. Hx of mitral valve replacement with mechanical valve    4. Primary hypertension  Assessment & Plan:  Continue on Coreg 25 twice daily and lisinopril 10 mg daily               Follow Up     Return in about 6 months (around 9/13/2024) for Follow with Glory Bonilla, EKG with F/U.          Patient was given instructions and counseling regarding his condition or for health maintenance advice. Please see specific information pulled into the AVS if appropriate.

## 2024-03-13 NOTE — ASSESSMENT & PLAN NOTE
Patient symptomatically stable from his breathing standpoint repeating echocardiogram to see if any change in hypertrophy blood pressure was mildly elevated today gave him blood pressure log to keep track to allow for further titration if needed for aggressive blood pressure control

## 2024-03-26 ENCOUNTER — LAB (OUTPATIENT)
Dept: LAB | Facility: HOSPITAL | Age: 54
End: 2024-03-26
Payer: OTHER GOVERNMENT

## 2024-03-26 DIAGNOSIS — Z95.2 HX OF MITRAL VALVE REPLACEMENT WITH MECHANICAL VALVE: ICD-10-CM

## 2024-03-26 LAB
INR PPP: 3.8 (ref 0.86–1.15)
PROTHROMBIN TIME: 38 SECONDS (ref 11.8–14.9)

## 2024-03-26 PROCEDURE — 85610 PROTHROMBIN TIME: CPT

## 2024-03-26 PROCEDURE — 36415 COLL VENOUS BLD VENIPUNCTURE: CPT

## 2024-03-27 ENCOUNTER — ANTICOAGULATION VISIT (OUTPATIENT)
Dept: CARDIOLOGY | Facility: CLINIC | Age: 54
End: 2024-03-27
Payer: OTHER GOVERNMENT

## 2024-03-27 DIAGNOSIS — Z95.2 HX OF MITRAL VALVE REPLACEMENT WITH MECHANICAL VALVE: Primary | ICD-10-CM

## 2024-03-27 NOTE — PROGRESS NOTES
Lab Results   Component Value Date    INR 3.80 (H) 03/26/2024    INR 2.67 (H) 03/08/2024    INR 3.45 (H) 03/05/2024    PROTIME 38.0 (H) 03/26/2024    PROTIME 28.9 (H) 03/08/2024    PROTIME 35.3 (H) 03/05/2024     DXHx of mitral valve replacement with mechanical valve   Taking 9mg QD  Range 2.5-3.5  [facility]

## 2024-04-02 ENCOUNTER — TELEPHONE (OUTPATIENT)
Dept: CARDIOLOGY | Facility: CLINIC | Age: 54
End: 2024-04-02
Payer: OTHER GOVERNMENT

## 2024-04-02 NOTE — TELEPHONE ENCOUNTER
----- Message from GABY Nichole sent at 4/2/2024  9:15 AM EDT -----  Notify patient the results of their Holter monitor were unremarkable. There were some occasional skipped beats noted but no ectopy or concerning arrhythmias.

## 2024-04-08 ENCOUNTER — HOSPITAL ENCOUNTER (OUTPATIENT)
Dept: CARDIOLOGY | Facility: HOSPITAL | Age: 54
Discharge: HOME | End: 2024-04-08
Admitting: INTERNAL MEDICINE
Payer: OTHER GOVERNMENT

## 2024-04-08 DIAGNOSIS — I42.1 HOCM (HYPERTROPHIC OBSTRUCTIVE CARDIOMYOPATHY): ICD-10-CM

## 2024-04-08 PROCEDURE — 93306 TTE W/DOPPLER COMPLETE: CPT

## 2024-04-08 PROCEDURE — 25010000002 SULFUR HEXAFLUORIDE MICROSPH 60.7-25 MG RECONSTITUTED SUSPENSION: Performed by: INTERNAL MEDICINE

## 2024-04-08 PROCEDURE — 93306 TTE W/DOPPLER COMPLETE: CPT | Performed by: INTERNAL MEDICINE

## 2024-04-08 RX ADMIN — SULFUR HEXAFLUORIDE 2 ML: KIT at 15:42

## 2024-04-09 LAB
BH CV ECHO MEAS - AO MAX PG: 8.3 MMHG
BH CV ECHO MEAS - AO MEAN PG: 4.7 MMHG
BH CV ECHO MEAS - AO V2 MAX: 143.9 CM/SEC
BH CV ECHO MEAS - AO V2 VTI: 27.3 CM
BH CV ECHO MEAS - AVA(I,D): 1.83 CM2
BH CV ECHO MEAS - EDV(CUBED): 58.2 ML
BH CV ECHO MEAS - EDV(MOD-SP2): 81.7 ML
BH CV ECHO MEAS - EDV(MOD-SP4): 119 ML
BH CV ECHO MEAS - EF(MOD-BP): 44 %
BH CV ECHO MEAS - EF(MOD-SP2): 38.6 %
BH CV ECHO MEAS - EF(MOD-SP4): 51.5 %
BH CV ECHO MEAS - ESV(CUBED): 34.4 ML
BH CV ECHO MEAS - ESV(MOD-SP2): 50.2 ML
BH CV ECHO MEAS - ESV(MOD-SP4): 57.7 ML
BH CV ECHO MEAS - FS: 16.1 %
BH CV ECHO MEAS - IVS/LVPW: 0.94 CM
BH CV ECHO MEAS - IVSD: 1.41 CM
BH CV ECHO MEAS - LA DIMENSION: 4.2 CM
BH CV ECHO MEAS - LAT PEAK E' VEL: 8.1 CM/SEC
BH CV ECHO MEAS - LV MASS(C)D: 212.6 GRAMS
BH CV ECHO MEAS - LV MAX PG: 3.2 MMHG
BH CV ECHO MEAS - LV MEAN PG: 2.36 MMHG
BH CV ECHO MEAS - LV V1 MAX: 90 CM/SEC
BH CV ECHO MEAS - LV V1 VTI: 18.4 CM
BH CV ECHO MEAS - LVIDD: 3.9 CM
BH CV ECHO MEAS - LVIDS: 3.3 CM
BH CV ECHO MEAS - LVOT AREA: 2.7 CM2
BH CV ECHO MEAS - LVOT DIAM: 1.86 CM
BH CV ECHO MEAS - LVPWD: 1.5 CM
BH CV ECHO MEAS - MED PEAK E' VEL: 6 CM/SEC
BH CV ECHO MEAS - MV A MAX VEL: 102.4 CM/SEC
BH CV ECHO MEAS - MV DEC SLOPE: 445.2 CM/SEC2
BH CV ECHO MEAS - MV DEC TIME: 0.33 SEC
BH CV ECHO MEAS - MV E MAX VEL: 147 CM/SEC
BH CV ECHO MEAS - MV E/A: 1.44
BH CV ECHO MEAS - PA V2 MAX: 152 CM/SEC
BH CV ECHO MEAS - RVDD: 3.3 CM
BH CV ECHO MEAS - SV(LVOT): 50 ML
BH CV ECHO MEAS - SV(MOD-SP2): 31.5 ML
BH CV ECHO MEAS - SV(MOD-SP4): 61.3 ML
BH CV ECHO MEAS - TAPSE (>1.6): 1.8 CM
BH CV ECHO MEASUREMENTS AVERAGE E/E' RATIO: 20.85
IVRT: 90 MS
LV EF 2D ECHO EST: 60 %

## 2024-04-10 ENCOUNTER — TELEPHONE (OUTPATIENT)
Dept: CARDIOLOGY | Facility: CLINIC | Age: 54
End: 2024-04-10
Payer: OTHER GOVERNMENT

## 2024-04-10 ENCOUNTER — LAB (OUTPATIENT)
Dept: LAB | Facility: HOSPITAL | Age: 54
End: 2024-04-10
Payer: OTHER GOVERNMENT

## 2024-04-10 DIAGNOSIS — Z95.2 HX OF MITRAL VALVE REPLACEMENT WITH MECHANICAL VALVE: Primary | ICD-10-CM

## 2024-04-10 DIAGNOSIS — Z95.2 HX OF MITRAL VALVE REPLACEMENT WITH MECHANICAL VALVE: ICD-10-CM

## 2024-04-10 LAB
INR PPP: 3.99 (ref 0.86–1.15)
PROTHROMBIN TIME: 39.5 SECONDS (ref 11.8–14.9)

## 2024-04-10 PROCEDURE — 85610 PROTHROMBIN TIME: CPT

## 2024-04-10 PROCEDURE — 36415 COLL VENOUS BLD VENIPUNCTURE: CPT

## 2024-04-10 NOTE — TELEPHONE ENCOUNTER
----- Message from GABY Perkins sent at 4/9/2024  4:25 PM EDT -----  Echocardiogram shows normal left ventricular systolic function with mild to moderate LVH present.  Mechanical mitral valve is present.  All findings unchanged compared to prior echocardiogram.  Continue current medication and follow-up as scheduled

## 2024-04-11 ENCOUNTER — ANTICOAGULATION VISIT (OUTPATIENT)
Dept: CARDIOLOGY | Facility: CLINIC | Age: 54
End: 2024-04-11
Payer: OTHER GOVERNMENT

## 2024-04-11 DIAGNOSIS — Z95.2 HX OF MITRAL VALVE REPLACEMENT WITH MECHANICAL VALVE: Primary | ICD-10-CM

## 2024-04-11 NOTE — PROGRESS NOTES
Lab Results   Component Value Date    INR 3.99 (H) 04/10/2024    INR 3.80 (H) 03/26/2024    INR 2.67 (H) 03/08/2024    PROTIME 39.5 (H) 04/10/2024    PROTIME 38.0 (H) 03/26/2024    PROTIME 28.9 (H) 03/08/2024     DX Hx of mitral valve replacement with mechanical valve   Taking 4/8/9 mg every 3 days   Range 2.5-3.2  [facility]

## 2024-04-15 ENCOUNTER — LAB (OUTPATIENT)
Dept: LAB | Facility: HOSPITAL | Age: 54
End: 2024-04-15
Payer: OTHER GOVERNMENT

## 2024-04-15 DIAGNOSIS — Z95.2 HX OF MITRAL VALVE REPLACEMENT WITH MECHANICAL VALVE: ICD-10-CM

## 2024-04-15 LAB
INR PPP: 2.61 (ref 0.86–1.15)
PROTHROMBIN TIME: 28.4 SECONDS (ref 11.8–14.9)

## 2024-04-15 PROCEDURE — 85610 PROTHROMBIN TIME: CPT

## 2024-04-15 PROCEDURE — 36415 COLL VENOUS BLD VENIPUNCTURE: CPT

## 2024-04-16 ENCOUNTER — ANTICOAGULATION VISIT (OUTPATIENT)
Dept: CARDIOLOGY | Facility: CLINIC | Age: 54
End: 2024-04-16
Payer: OTHER GOVERNMENT

## 2024-04-16 DIAGNOSIS — Z95.2 HX OF MITRAL VALVE REPLACEMENT WITH MECHANICAL VALVE: Primary | ICD-10-CM

## 2024-04-16 NOTE — PROGRESS NOTES
Lab Results   Component Value Date    INR 2.61 (H) 04/15/2024    INR 3.99 (H) 04/10/2024    INR 3.80 (H) 03/26/2024    PROTIME 28.4 (H) 04/15/2024    PROTIME 39.5 (H) 04/10/2024    PROTIME 38.0 (H) 03/26/2024     Dx: hx of mitral valve replacement with mechanical valve  Pt taking 8  Range: 2.5-3.5  [facility]

## 2024-05-03 ENCOUNTER — LAB (OUTPATIENT)
Dept: LAB | Facility: HOSPITAL | Age: 54
End: 2024-05-03
Payer: OTHER GOVERNMENT

## 2024-05-03 DIAGNOSIS — Z95.2 HX OF MITRAL VALVE REPLACEMENT WITH MECHANICAL VALVE: ICD-10-CM

## 2024-05-03 LAB
INR PPP: 3.52 (ref 0.86–1.15)
PROTHROMBIN TIME: 35.8 SECONDS (ref 11.8–14.9)

## 2024-05-03 PROCEDURE — 36415 COLL VENOUS BLD VENIPUNCTURE: CPT

## 2024-05-03 PROCEDURE — 85610 PROTHROMBIN TIME: CPT

## 2024-05-06 ENCOUNTER — ANTICOAGULATION VISIT (OUTPATIENT)
Dept: CARDIOLOGY | Facility: CLINIC | Age: 54
End: 2024-05-06
Payer: OTHER GOVERNMENT

## 2024-05-06 DIAGNOSIS — Z95.2 HX OF MITRAL VALVE REPLACEMENT WITH MECHANICAL VALVE: Primary | ICD-10-CM

## 2024-05-15 RX ORDER — AMOXICILLIN AND CLAVULANATE POTASSIUM 875; 125 MG/1; MG/1
1 TABLET, FILM COATED ORAL 2 TIMES DAILY
Qty: 20 TABLET | Refills: 0 | Status: SHIPPED | OUTPATIENT
Start: 2024-05-15 | End: 2024-05-20 | Stop reason: ALTCHOICE

## 2024-05-20 ENCOUNTER — LAB (OUTPATIENT)
Dept: LAB | Facility: HOSPITAL | Age: 54
End: 2024-05-20
Payer: OTHER GOVERNMENT

## 2024-05-20 ENCOUNTER — ANTICOAGULATION VISIT (OUTPATIENT)
Dept: CARDIOLOGY | Facility: CLINIC | Age: 54
End: 2024-05-20
Payer: OTHER GOVERNMENT

## 2024-05-20 ENCOUNTER — OFFICE VISIT (OUTPATIENT)
Dept: FAMILY MEDICINE CLINIC | Facility: CLINIC | Age: 54
End: 2024-05-20
Payer: OTHER GOVERNMENT

## 2024-05-20 VITALS
WEIGHT: 237.2 LBS | OXYGEN SATURATION: 96 % | SYSTOLIC BLOOD PRESSURE: 134 MMHG | HEIGHT: 70 IN | TEMPERATURE: 96.1 F | DIASTOLIC BLOOD PRESSURE: 72 MMHG | HEART RATE: 112 BPM | BODY MASS INDEX: 33.96 KG/M2

## 2024-05-20 DIAGNOSIS — E11.9 TYPE 2 DIABETES MELLITUS WITHOUT COMPLICATION, WITHOUT LONG-TERM CURRENT USE OF INSULIN: ICD-10-CM

## 2024-05-20 DIAGNOSIS — Z71.89 COUNSELING FOR MARITAL AND PARTNER PROBLEMS: ICD-10-CM

## 2024-05-20 DIAGNOSIS — H66.90 ACUTE OTITIS MEDIA, UNSPECIFIED OTITIS MEDIA TYPE: Primary | ICD-10-CM

## 2024-05-20 DIAGNOSIS — Z63.0 COUNSELING FOR MARITAL AND PARTNER PROBLEMS: ICD-10-CM

## 2024-05-20 DIAGNOSIS — Z95.2 HX OF MITRAL VALVE REPLACEMENT WITH MECHANICAL VALVE: Primary | ICD-10-CM

## 2024-05-20 DIAGNOSIS — N52.9 ERECTILE DYSFUNCTION, UNSPECIFIED ERECTILE DYSFUNCTION TYPE: ICD-10-CM

## 2024-05-20 DIAGNOSIS — E78.5 HYPERLIPIDEMIA, UNSPECIFIED HYPERLIPIDEMIA TYPE: ICD-10-CM

## 2024-05-20 DIAGNOSIS — J30.9 ALLERGIC RHINITIS, UNSPECIFIED SEASONALITY, UNSPECIFIED TRIGGER: ICD-10-CM

## 2024-05-20 DIAGNOSIS — Z95.2 HX OF MITRAL VALVE REPLACEMENT WITH MECHANICAL VALVE: ICD-10-CM

## 2024-05-20 DIAGNOSIS — I10 PRIMARY HYPERTENSION: ICD-10-CM

## 2024-05-20 LAB
ALBUMIN SERPL-MCNC: 4.1 G/DL (ref 3.5–5.2)
ALBUMIN UR-MCNC: 2 MG/DL
ALBUMIN/GLOB SERPL: 1.4 G/DL
ALP SERPL-CCNC: 52 U/L (ref 39–117)
ALT SERPL W P-5'-P-CCNC: 23 U/L (ref 1–41)
ANION GAP SERPL CALCULATED.3IONS-SCNC: 9 MMOL/L (ref 5–15)
AST SERPL-CCNC: 27 U/L (ref 1–40)
BILIRUB SERPL-MCNC: 0.6 MG/DL (ref 0–1.2)
BUN SERPL-MCNC: 9 MG/DL (ref 6–20)
BUN/CREAT SERPL: 9 (ref 7–25)
CALCIUM SPEC-SCNC: 9 MG/DL (ref 8.6–10.5)
CHLORIDE SERPL-SCNC: 106 MMOL/L (ref 98–107)
CHOLEST SERPL-MCNC: 140 MG/DL (ref 0–200)
CO2 SERPL-SCNC: 23 MMOL/L (ref 22–29)
CREAT SERPL-MCNC: 1 MG/DL (ref 0.76–1.27)
CREAT UR-MCNC: 380.1 MG/DL
DEPRECATED RDW RBC AUTO: 34.6 FL (ref 37–54)
EGFRCR SERPLBLD CKD-EPI 2021: 90 ML/MIN/1.73
ERYTHROCYTE [DISTWIDTH] IN BLOOD BY AUTOMATED COUNT: 13.4 % (ref 12.3–15.4)
GLOBULIN UR ELPH-MCNC: 2.9 GM/DL
GLUCOSE SERPL-MCNC: 112 MG/DL (ref 65–99)
HBA1C MFR BLD: 6.7 % (ref 4.8–5.6)
HCT VFR BLD AUTO: 42.6 % (ref 37.5–51)
HDLC SERPL-MCNC: 36 MG/DL (ref 40–60)
HGB BLD-MCNC: 13.1 G/DL (ref 13–17.7)
INR PPP: 2.78 (ref 0.86–1.15)
LDLC SERPL CALC-MCNC: 83 MG/DL (ref 0–100)
LDLC/HDLC SERPL: 2.27 {RATIO}
MCH RBC QN AUTO: 22.9 PG (ref 26.6–33)
MCHC RBC AUTO-ENTMCNC: 30.8 G/DL (ref 31.5–35.7)
MCV RBC AUTO: 74.3 FL (ref 79–97)
MICROALBUMIN/CREAT UR: 5.3 MG/G (ref 0–29)
PLATELET # BLD AUTO: 255 10*3/MM3 (ref 140–450)
PMV BLD AUTO: 9.9 FL (ref 6–12)
POTASSIUM SERPL-SCNC: 4 MMOL/L (ref 3.5–5.2)
PROT SERPL-MCNC: 7 G/DL (ref 6–8.5)
PROTHROMBIN TIME: 29.8 SECONDS (ref 11.8–14.9)
RBC # BLD AUTO: 5.73 10*6/MM3 (ref 4.14–5.8)
SODIUM SERPL-SCNC: 138 MMOL/L (ref 136–145)
T4 FREE SERPL-MCNC: 1.19 NG/DL (ref 0.93–1.7)
TRIGL SERPL-MCNC: 112 MG/DL (ref 0–150)
TSH SERPL DL<=0.05 MIU/L-ACNC: 2.11 UIU/ML (ref 0.27–4.2)
VLDLC SERPL-MCNC: 21 MG/DL (ref 5–40)
WBC NRBC COR # BLD AUTO: 3.88 10*3/MM3 (ref 3.4–10.8)

## 2024-05-20 PROCEDURE — 80053 COMPREHEN METABOLIC PANEL: CPT

## 2024-05-20 PROCEDURE — 99214 OFFICE O/P EST MOD 30 MIN: CPT | Performed by: NURSE PRACTITIONER

## 2024-05-20 PROCEDURE — 83036 HEMOGLOBIN GLYCOSYLATED A1C: CPT

## 2024-05-20 PROCEDURE — 84439 ASSAY OF FREE THYROXINE: CPT

## 2024-05-20 PROCEDURE — 85027 COMPLETE CBC AUTOMATED: CPT

## 2024-05-20 PROCEDURE — 82570 ASSAY OF URINE CREATININE: CPT

## 2024-05-20 PROCEDURE — 80061 LIPID PANEL: CPT

## 2024-05-20 PROCEDURE — 82043 UR ALBUMIN QUANTITATIVE: CPT

## 2024-05-20 PROCEDURE — 36415 COLL VENOUS BLD VENIPUNCTURE: CPT

## 2024-05-20 PROCEDURE — 85610 PROTHROMBIN TIME: CPT

## 2024-05-20 PROCEDURE — 84443 ASSAY THYROID STIM HORMONE: CPT

## 2024-05-20 RX ORDER — AMOXICILLIN 500 MG/1
500 CAPSULE ORAL 2 TIMES DAILY
Qty: 20 CAPSULE | Refills: 0 | Status: SHIPPED | OUTPATIENT
Start: 2024-05-20 | End: 2024-05-30

## 2024-05-20 RX ORDER — DULAGLUTIDE 0.75 MG/.5ML
0.75 INJECTION, SOLUTION SUBCUTANEOUS WEEKLY
Qty: 6 ML | Refills: 1 | Status: SHIPPED | OUTPATIENT
Start: 2024-05-20

## 2024-05-20 RX ORDER — SILDENAFIL 100 MG/1
100 TABLET, FILM COATED ORAL AS NEEDED
Qty: 30 TABLET | Refills: 1 | Status: SHIPPED | OUTPATIENT
Start: 2024-05-20

## 2024-05-20 RX ORDER — AZELASTINE 1 MG/ML
2 SPRAY, METERED NASAL 2 TIMES DAILY
Qty: 30 ML | Refills: 6 | Status: SHIPPED | OUTPATIENT
Start: 2024-05-20

## 2024-05-20 SDOH — SOCIAL STABILITY - SOCIAL INSECURITY: PROBLEMS IN RELATIONSHIP WITH SPOUSE OR PARTNER: Z63.0

## 2024-05-20 NOTE — PROGRESS NOTES
"Chief Complaint  Diabetes (6 month follow up )    Subjective         Kevin Clifton presents to [facility]  Patient presents to the office today for 6-month follow-up regarding his diabetes.  He states that he did his lab work this morning.  His INR is currently therapeutic.  I explained to him that I would reach out to him tomorrow when the results are received.  Pressure is 134/72.  He denies any chest pain shortness breath palpitations at this time.  He does state that he would like a referral to marriage counseling.  I did place a referral for his wife but was told that they were approximately a year out.  He does state that [facility]. patient does state that he has been having bilateral ear pain.  He also states that he is currently taking his allergy medicine as prescribed.    Diabetes         Objective     Vitals:    05/20/24 1543   BP: 134/72   BP Location: Right arm   Patient Position: Sitting   Cuff Size: Adult   Pulse: 112   Temp: 96.1 °F (35.6 °C)   TempSrc: Temporal   SpO2: 96%   Weight: 108 kg (237 lb 3.2 oz)   Height: 177.8 cm (70\")      Body mass index is 34.03 kg/m².    BMI is >= 30 and <35. (Class 1 Obesity). The following options were offered after discussion;: nutrition counseling/recommendations        Physical Exam  Vitals reviewed.   Constitutional:       Appearance: Normal appearance.   HENT:      Right Ear: Hearing, tympanic membrane, ear canal and external ear normal.      Left Ear: Hearing, ear canal and external ear normal. A middle ear effusion is present. Tympanic membrane is erythematous.   Cardiovascular:      Rate and Rhythm: Normal rate and regular rhythm.      Pulses: Normal pulses.      Heart sounds: Normal heart sounds, S1 normal and S2 normal. No murmur heard.  Pulmonary:      Effort: Pulmonary effort is normal. No respiratory distress.      Breath sounds: Normal breath " sounds.   Skin:     General: Skin is warm and dry.   Neurological:      Mental Status: He is alert and oriented to person, place, and time.   Psychiatric:         Attention and Perception: Attention normal.         Mood and Affect: Mood normal.         Behavior: Behavior normal.          Result Review :   The following data was reviewed by: GABY Lion on 05/20/2024:      Procedures    Assessment and Plan   Diagnoses and all orders for this visit:    1. Acute otitis media, unspecified otitis media type (Primary)  -     amoxicillin (AMOXIL) 500 MG capsule; Take 1 capsule by mouth 2 (Two) Times a Day for 10 days.  Dispense: 20 capsule; Refill: 0    2. Erectile dysfunction, unspecified erectile dysfunction type  -     sildenafil (VIAGRA) 100 MG tablet; Take 1 tablet by mouth As Needed for Erectile Dysfunction.  Dispense: 30 tablet; Refill: 1    3. Type 2 diabetes mellitus without complication, without long-term current use of insulin  -     Dulaglutide (Trulicity) 0.75 MG/0.5ML solution pen-injector; Inject 0.75 mg under the skin into the appropriate area as directed 1 (One) Time Per Week.  Dispense: 6 mL; Refill: 1  -     CBC (No Diff); Future  -     Comprehensive Metabolic Panel; Future  -     Lipid Panel; Future  -     TSH+Free T4; Future  -     Hemoglobin A1c; Future  -     Microalbumin / Creatinine Urine Ratio - Urine, Clean Catch; Future    4. Allergic rhinitis, unspecified seasonality, unspecified trigger  -     azelastine (ASTELIN) 0.1 % nasal spray; 2 sprays into the nostril(s) as directed by provider 2 (Two) Times a Day. Use in each nostril as directed  Dispense: 30 mL; Refill: 6    5. Primary hypertension  -     CBC (No Diff); Future  -     Comprehensive Metabolic Panel; Future  -     Lipid Panel; Future  -     TSH+Free T4; Future  -     Hemoglobin A1c; Future  -     Microalbumin / Creatinine Urine Ratio - Urine, Clean Catch; Future    6. Hyperlipidemia, unspecified hyperlipidemia type  -     CBC (No  Diff); Future  -     Comprehensive Metabolic Panel; Future  -     Lipid Panel; Future  -     TSH+Free T4; Future  -     Hemoglobin A1c; Future  -     Microalbumin / Creatinine Urine Ratio - Urine, Clean Catch; Future    7. Counseling for marital and partner problems  -     Ambulatory Referral to Psychology          Follow Up   Return in about 6 months (around 11/20/2024) for Recheck.  Patient was given instructions and counseling regarding his condition or for health maintenance advice. Please see specific information pulled into the AVS if appropriate.

## 2024-05-20 NOTE — PROGRESS NOTES
Lab Results   Component Value Date    INR 2.78 (H) 05/20/2024    INR 3.52 (H) 05/03/2024    INR 2.61 (H) 04/15/2024    PROTIME 29.8 (H) 05/20/2024    PROTIME 35.8 (H) 05/03/2024    PROTIME 28.4 (H) 04/15/2024    Hx of mitral valve replacement with mechanical valve   Range 2.5-3.5  8 mg  B[facility]      Lm    Pt is aware of results and instructions.

## 2024-06-17 ENCOUNTER — LAB (OUTPATIENT)
Dept: LAB | Facility: HOSPITAL | Age: 54
End: 2024-06-17
Payer: OTHER GOVERNMENT

## 2024-06-17 ENCOUNTER — ANTICOAGULATION VISIT (OUTPATIENT)
Dept: CARDIOLOGY | Facility: CLINIC | Age: 54
End: 2024-06-17
Payer: OTHER GOVERNMENT

## 2024-06-17 DIAGNOSIS — Z95.2 HX OF MITRAL VALVE REPLACEMENT WITH MECHANICAL VALVE: Primary | ICD-10-CM

## 2024-06-17 DIAGNOSIS — Z95.2 HX OF MITRAL VALVE REPLACEMENT WITH MECHANICAL VALVE: ICD-10-CM

## 2024-06-17 LAB
INR PPP: 2.52 (ref 0.86–1.15)
PROTHROMBIN TIME: 27.5 SECONDS (ref 11.8–14.9)

## 2024-06-17 PROCEDURE — 36415 COLL VENOUS BLD VENIPUNCTURE: CPT

## 2024-06-17 PROCEDURE — 85610 PROTHROMBIN TIME: CPT

## 2024-06-17 NOTE — PROGRESS NOTES
Lab Results   Component Value Date    INR 2.52 (H) 06/17/2024    INR 2.78 (H) 05/20/2024    INR 3.52 (H) 05/03/2024    PROTIME 27.5 (H) 06/17/2024    PROTIME 29.8 (H) 05/20/2024    PROTIME 35.8 (H) 05/03/2024     Dx: hx of mitral valve replacement with mechanical valve  Pt taking 8  Range: 2.5-3.5  bhh

## 2024-07-09 ENCOUNTER — LAB (OUTPATIENT)
Dept: LAB | Facility: HOSPITAL | Age: 54
End: 2024-07-09
Payer: OTHER GOVERNMENT

## 2024-07-09 DIAGNOSIS — Z95.2 HX OF MITRAL VALVE REPLACEMENT WITH MECHANICAL VALVE: ICD-10-CM

## 2024-07-09 DIAGNOSIS — I48.0 PAROXYSMAL ATRIAL FIBRILLATION: Primary | ICD-10-CM

## 2024-07-09 LAB
INR PPP: 2.94 (ref 0.86–1.15)
PROTHROMBIN TIME: 31.1 SECONDS (ref 11.8–14.9)

## 2024-07-09 PROCEDURE — 36415 COLL VENOUS BLD VENIPUNCTURE: CPT

## 2024-07-09 PROCEDURE — 85610 PROTHROMBIN TIME: CPT

## 2024-07-09 RX ORDER — WARFARIN SODIUM 3 MG/1
TABLET ORAL
Qty: 90 TABLET | Refills: 3 | Status: SHIPPED | OUTPATIENT
Start: 2024-07-09

## 2024-07-09 RX ORDER — WARFARIN SODIUM 5 MG/1
TABLET ORAL
Qty: 90 TABLET | Refills: 3 | Status: SHIPPED | OUTPATIENT
Start: 2024-07-09

## 2024-07-09 RX ORDER — WARFARIN SODIUM 4 MG/1
TABLET ORAL
Qty: 90 TABLET | Refills: 3 | Status: SHIPPED | OUTPATIENT
Start: 2024-07-09

## 2024-07-10 ENCOUNTER — ANTICOAGULATION VISIT (OUTPATIENT)
Dept: CARDIOLOGY | Facility: CLINIC | Age: 54
End: 2024-07-10
Payer: OTHER GOVERNMENT

## 2024-07-10 DIAGNOSIS — Z95.2 HX OF MITRAL VALVE REPLACEMENT WITH MECHANICAL VALVE: Primary | ICD-10-CM

## 2024-07-10 NOTE — PROGRESS NOTES
Lab Results   Component Value Date    INR 2.94 (H) 07/09/2024    INR 2.52 (H) 06/17/2024    INR 2.78 (H) 05/20/2024    PROTIME 31.1 (H) 07/09/2024    PROTIME 27.5 (H) 06/17/2024    PROTIME 29.8 (H) 05/20/2024     Dx: hx of mitral valve replacement with mechanical valve  Pt taking 8  Range: 2.5-3.5  bhh

## 2024-07-19 DIAGNOSIS — N52.9 ERECTILE DYSFUNCTION, UNSPECIFIED ERECTILE DYSFUNCTION TYPE: ICD-10-CM

## 2024-07-19 RX ORDER — SILDENAFIL 100 MG/1
TABLET, FILM COATED ORAL
Qty: 30 TABLET | Refills: 1 | Status: SHIPPED | OUTPATIENT
Start: 2024-07-19

## 2024-08-09 ENCOUNTER — LAB (OUTPATIENT)
Dept: LAB | Facility: HOSPITAL | Age: 54
End: 2024-08-09
Payer: OTHER GOVERNMENT

## 2024-08-09 DIAGNOSIS — Z95.2 HX OF MITRAL VALVE REPLACEMENT WITH MECHANICAL VALVE: ICD-10-CM

## 2024-08-09 LAB
INR PPP: 2.81 (ref 0.86–1.15)
PROTHROMBIN TIME: 30 SECONDS (ref 11.8–14.9)

## 2024-08-09 PROCEDURE — 85610 PROTHROMBIN TIME: CPT

## 2024-08-09 PROCEDURE — 36415 COLL VENOUS BLD VENIPUNCTURE: CPT

## 2024-08-12 ENCOUNTER — ANTICOAGULATION VISIT (OUTPATIENT)
Dept: CARDIOLOGY | Facility: CLINIC | Age: 54
End: 2024-08-12
Payer: OTHER GOVERNMENT

## 2024-08-12 DIAGNOSIS — Z95.2 HX OF MITRAL VALVE REPLACEMENT WITH MECHANICAL VALVE: Primary | ICD-10-CM

## 2024-08-12 NOTE — PROGRESS NOTES
Lab Results   Component Value Date    INR 2.81 (H) 08/09/2024    INR 2.94 (H) 07/09/2024    INR 2.52 (H) 06/17/2024    PROTIME 30.0 (H) 08/09/2024    PROTIME 31.1 (H) 07/09/2024    PROTIME 27.5 (H) 06/17/2024    Hx of mitral valve replacement with mechanical valve   Range 2.5-3.5  8 mg  B[facility]    Pt is aware of results and instructions.

## 2024-09-12 ENCOUNTER — LAB (OUTPATIENT)
Dept: LAB | Facility: HOSPITAL | Age: 54
End: 2024-09-12
Payer: OTHER GOVERNMENT

## 2024-09-12 DIAGNOSIS — Z95.2 HX OF MITRAL VALVE REPLACEMENT WITH MECHANICAL VALVE: ICD-10-CM

## 2024-09-12 LAB
INR PPP: 1.59 (ref 0.86–1.15)
PROTHROMBIN TIME: 19.3 SECONDS (ref 11.8–14.9)

## 2024-09-12 PROCEDURE — 36415 COLL VENOUS BLD VENIPUNCTURE: CPT

## 2024-09-12 PROCEDURE — 85610 PROTHROMBIN TIME: CPT

## 2024-09-13 ENCOUNTER — ANTICOAGULATION VISIT (OUTPATIENT)
Dept: CARDIOLOGY | Facility: CLINIC | Age: 54
End: 2024-09-13
Payer: OTHER GOVERNMENT

## 2024-09-13 DIAGNOSIS — Z95.2 HX OF MITRAL VALVE REPLACEMENT WITH MECHANICAL VALVE: Primary | ICD-10-CM

## 2024-09-13 NOTE — PROGRESS NOTES
Lab Results   Component Value Date    INR 1.59 (H) 09/12/2024    INR 2.81 (H) 08/09/2024    INR 2.94 (H) 07/09/2024    PROTIME 19.3 (H) 09/12/2024    PROTIME 30.0 (H) 08/09/2024    PROTIME 31.1 (H) 07/09/2024     Hx of mitral valve replacement with mechanical valve     Range: 2.5-3.5    8mg    B[facility]    Patient is aware and verbalized understanding.

## 2024-09-16 ENCOUNTER — ANTICOAGULATION VISIT (OUTPATIENT)
Dept: CARDIOLOGY | Facility: CLINIC | Age: 54
End: 2024-09-16

## 2024-09-16 ENCOUNTER — LAB (OUTPATIENT)
Dept: LAB | Facility: HOSPITAL | Age: 54
End: 2024-09-16
Payer: OTHER GOVERNMENT

## 2024-09-16 ENCOUNTER — OFFICE VISIT (OUTPATIENT)
Dept: CARDIOLOGY | Facility: CLINIC | Age: 54
End: 2024-09-16
Payer: OTHER GOVERNMENT

## 2024-09-16 VITALS
DIASTOLIC BLOOD PRESSURE: 91 MMHG | HEIGHT: 70 IN | HEART RATE: 74 BPM | SYSTOLIC BLOOD PRESSURE: 131 MMHG | BODY MASS INDEX: 30.46 KG/M2 | WEIGHT: 212.8 LBS

## 2024-09-16 DIAGNOSIS — Z95.2 HX OF MITRAL VALVE REPLACEMENT WITH MECHANICAL VALVE: Primary | ICD-10-CM

## 2024-09-16 DIAGNOSIS — I48.0 PAROXYSMAL ATRIAL FIBRILLATION: Primary | ICD-10-CM

## 2024-09-16 DIAGNOSIS — E78.2 MIXED HYPERLIPIDEMIA: ICD-10-CM

## 2024-09-16 DIAGNOSIS — Z95.2 HX OF MITRAL VALVE REPLACEMENT WITH MECHANICAL VALVE: ICD-10-CM

## 2024-09-16 DIAGNOSIS — I10 PRIMARY HYPERTENSION: ICD-10-CM

## 2024-09-16 LAB
INR PPP: 2.27 (ref 0.86–1.15)
PROTHROMBIN TIME: 25.5 SECONDS (ref 11.8–14.9)

## 2024-09-16 PROCEDURE — 85610 PROTHROMBIN TIME: CPT

## 2024-09-16 PROCEDURE — 36415 COLL VENOUS BLD VENIPUNCTURE: CPT

## 2024-09-16 PROCEDURE — 93000 ELECTROCARDIOGRAM COMPLETE: CPT | Performed by: NURSE PRACTITIONER

## 2024-09-16 PROCEDURE — 99214 OFFICE O/P EST MOD 30 MIN: CPT | Performed by: NURSE PRACTITIONER

## 2024-09-20 ENCOUNTER — LAB (OUTPATIENT)
Dept: LAB | Facility: HOSPITAL | Age: 54
End: 2024-09-20
Payer: OTHER GOVERNMENT

## 2024-09-20 ENCOUNTER — ANTICOAGULATION VISIT (OUTPATIENT)
Dept: CARDIOLOGY | Facility: CLINIC | Age: 54
End: 2024-09-20
Payer: OTHER GOVERNMENT

## 2024-09-20 DIAGNOSIS — Z95.2 HX OF MITRAL VALVE REPLACEMENT WITH MECHANICAL VALVE: Primary | ICD-10-CM

## 2024-09-20 DIAGNOSIS — Z95.2 HX OF MITRAL VALVE REPLACEMENT WITH MECHANICAL VALVE: ICD-10-CM

## 2024-09-20 LAB
INR PPP: 2.33 (ref 0.86–1.15)
PROTHROMBIN TIME: 25.9 SECONDS (ref 11.8–14.9)

## 2024-09-20 PROCEDURE — 36415 COLL VENOUS BLD VENIPUNCTURE: CPT

## 2024-09-20 PROCEDURE — 85610 PROTHROMBIN TIME: CPT

## 2024-09-23 DIAGNOSIS — N52.9 ERECTILE DYSFUNCTION, UNSPECIFIED ERECTILE DYSFUNCTION TYPE: ICD-10-CM

## 2024-09-24 RX ORDER — SILDENAFIL 100 MG/1
TABLET, FILM COATED ORAL
Qty: 30 TABLET | Refills: 1 | Status: SHIPPED | OUTPATIENT
Start: 2024-09-24

## 2024-10-14 ENCOUNTER — LAB (OUTPATIENT)
Dept: LAB | Facility: HOSPITAL | Age: 54
End: 2024-10-14
Payer: OTHER GOVERNMENT

## 2024-10-14 DIAGNOSIS — Z95.2 HX OF MITRAL VALVE REPLACEMENT WITH MECHANICAL VALVE: ICD-10-CM

## 2024-10-14 LAB
INR PPP: 2.44 (ref 0.86–1.15)
PROTHROMBIN TIME: 26.9 SECONDS (ref 11.8–14.9)

## 2024-10-14 PROCEDURE — 36415 COLL VENOUS BLD VENIPUNCTURE: CPT

## 2024-10-14 PROCEDURE — 85610 PROTHROMBIN TIME: CPT

## 2024-10-15 ENCOUNTER — ANTICOAGULATION VISIT (OUTPATIENT)
Dept: CARDIOLOGY | Facility: CLINIC | Age: 54
End: 2024-10-15
Payer: OTHER GOVERNMENT

## 2024-10-15 DIAGNOSIS — Z95.2 HX OF MITRAL VALVE REPLACEMENT WITH MECHANICAL VALVE: Primary | ICD-10-CM

## 2024-10-15 NOTE — PROGRESS NOTES
Lab Results   Component Value Date    INR 2.44 (H) 10/14/2024    INR 2.33 (H) 09/20/2024    INR 2.27 (H) 09/16/2024    PROTIME 26.9 (H) 10/14/2024    PROTIME 25.9 (H) 09/20/2024    PROTIME 25.5 (H) 09/16/2024     Hx of mitral valve replacement with mechanical valve     Range: 2.5-3.5    8mg    [facility]    Per Glory:  Take 9 mg today and then 8 mg daily, recheck Friday     Spoke with patient, patient is aware and verbalized understanding.

## 2024-10-18 ENCOUNTER — LAB (OUTPATIENT)
Dept: LAB | Facility: HOSPITAL | Age: 54
End: 2024-10-18
Payer: OTHER GOVERNMENT

## 2024-10-18 DIAGNOSIS — Z95.2 HX OF MITRAL VALVE REPLACEMENT WITH MECHANICAL VALVE: ICD-10-CM

## 2024-10-18 LAB
INR PPP: 2.96 (ref 0.86–1.15)
PROTHROMBIN TIME: 31.3 SECONDS (ref 11.8–14.9)

## 2024-10-18 PROCEDURE — 36415 COLL VENOUS BLD VENIPUNCTURE: CPT

## 2024-10-18 PROCEDURE — 85610 PROTHROMBIN TIME: CPT

## 2024-10-21 ENCOUNTER — ANTICOAGULATION VISIT (OUTPATIENT)
Dept: CARDIOLOGY | Facility: CLINIC | Age: 54
End: 2024-10-21
Payer: OTHER GOVERNMENT

## 2024-10-21 DIAGNOSIS — Z95.2 HX OF MITRAL VALVE REPLACEMENT WITH MECHANICAL VALVE: Primary | ICD-10-CM

## 2024-10-21 NOTE — PROGRESS NOTES
Lab Results   Component Value Date    INR 2.96 (H) 10/18/2024    INR 2.44 (H) 10/14/2024    INR 2.33 (H) 09/20/2024    PROTIME 31.3 (H) 10/18/2024    PROTIME 26.9 (H) 10/14/2024    PROTIME 25.9 (H) 09/20/2024     Hx of mitral valve replacement with mechanical valve     Range: 2.5-3.5    8mg    [facility]    Per Glroy:    Normal, continue same dose, recheck in 1 week     Spoke with patient, patient is aware and verbalized understanding.

## 2024-11-05 ENCOUNTER — LAB (OUTPATIENT)
Dept: LAB | Facility: HOSPITAL | Age: 54
End: 2024-11-05
Payer: OTHER GOVERNMENT

## 2024-11-05 DIAGNOSIS — Z95.2 HX OF MITRAL VALVE REPLACEMENT WITH MECHANICAL VALVE: ICD-10-CM

## 2024-11-05 LAB
INR PPP: 3.32 (ref 0.86–1.15)
PROTHROMBIN TIME: 34.2 SECONDS (ref 11.8–14.9)

## 2024-11-05 PROCEDURE — 36415 COLL VENOUS BLD VENIPUNCTURE: CPT

## 2024-11-05 PROCEDURE — 85610 PROTHROMBIN TIME: CPT

## 2024-11-06 ENCOUNTER — ANTICOAGULATION VISIT (OUTPATIENT)
Dept: CARDIOLOGY | Facility: CLINIC | Age: 54
End: 2024-11-06
Payer: OTHER GOVERNMENT

## 2024-11-06 DIAGNOSIS — Z95.2 HX OF MITRAL VALVE REPLACEMENT WITH MECHANICAL VALVE: Primary | ICD-10-CM

## 2024-11-06 NOTE — PROGRESS NOTES
Lab Results   Component Value Date    INR 3.32 (H) 11/05/2024    INR 2.96 (H) 10/18/2024    INR 2.44 (H) 10/14/2024    PROTIME 34.2 (H) 11/05/2024    PROTIME 31.3 (H) 10/18/2024    PROTIME 26.9 (H) 10/14/2024     Hx of mitral valve replacement with mechanical valve     Range: 2.5-3.5    8mg    BH    Spoke with patient, patient is aware and verbalized understanding.

## 2024-11-19 ENCOUNTER — LAB (OUTPATIENT)
Dept: LAB | Facility: HOSPITAL | Age: 54
End: 2024-11-19
Payer: OTHER GOVERNMENT

## 2024-11-19 DIAGNOSIS — Z95.2 HX OF MITRAL VALVE REPLACEMENT WITH MECHANICAL VALVE: ICD-10-CM

## 2024-11-19 DIAGNOSIS — I10 PRIMARY HYPERTENSION: ICD-10-CM

## 2024-11-19 DIAGNOSIS — E78.5 HYPERLIPIDEMIA, UNSPECIFIED HYPERLIPIDEMIA TYPE: ICD-10-CM

## 2024-11-19 DIAGNOSIS — E11.9 TYPE 2 DIABETES MELLITUS WITHOUT COMPLICATION, WITHOUT LONG-TERM CURRENT USE OF INSULIN: ICD-10-CM

## 2024-11-19 LAB
ALBUMIN SERPL-MCNC: 3.6 G/DL (ref 3.5–5.2)
ALBUMIN UR-MCNC: 1.4 MG/DL
ALBUMIN/GLOB SERPL: 1 G/DL
ALP SERPL-CCNC: 49 U/L (ref 39–117)
ALT SERPL W P-5'-P-CCNC: 25 U/L (ref 1–41)
ANION GAP SERPL CALCULATED.3IONS-SCNC: 8.2 MMOL/L (ref 5–15)
AST SERPL-CCNC: 35 U/L (ref 1–40)
BILIRUB SERPL-MCNC: 0.5 MG/DL (ref 0–1.2)
BUN SERPL-MCNC: 8 MG/DL (ref 6–20)
BUN/CREAT SERPL: 7.2 (ref 7–25)
CALCIUM SPEC-SCNC: 8.9 MG/DL (ref 8.6–10.5)
CHLORIDE SERPL-SCNC: 105 MMOL/L (ref 98–107)
CHOLEST SERPL-MCNC: 152 MG/DL (ref 0–200)
CO2 SERPL-SCNC: 24.8 MMOL/L (ref 22–29)
CREAT SERPL-MCNC: 1.11 MG/DL (ref 0.76–1.27)
CREAT UR-MCNC: 361.5 MG/DL
DEPRECATED RDW RBC AUTO: 35.4 FL (ref 37–54)
EGFRCR SERPLBLD CKD-EPI 2021: 78.9 ML/MIN/1.73
ERYTHROCYTE [DISTWIDTH] IN BLOOD BY AUTOMATED COUNT: 13.4 % (ref 12.3–15.4)
GLOBULIN UR ELPH-MCNC: 3.6 GM/DL
GLUCOSE SERPL-MCNC: 93 MG/DL (ref 65–99)
HBA1C MFR BLD: 5.7 % (ref 4.8–5.6)
HCT VFR BLD AUTO: 40.1 % (ref 37.5–51)
HDLC SERPL-MCNC: 42 MG/DL (ref 40–60)
HGB BLD-MCNC: 12.4 G/DL (ref 13–17.7)
INR PPP: 3.27 (ref 0.86–1.15)
LDLC SERPL CALC-MCNC: 92 MG/DL (ref 0–100)
LDLC/HDLC SERPL: 2.15 {RATIO}
MCH RBC QN AUTO: 22.8 PG (ref 26.6–33)
MCHC RBC AUTO-ENTMCNC: 30.9 G/DL (ref 31.5–35.7)
MCV RBC AUTO: 73.8 FL (ref 79–97)
MICROALBUMIN/CREAT UR: 3.9 MG/G (ref 0–29)
PLATELET # BLD AUTO: 272 10*3/MM3 (ref 140–450)
PMV BLD AUTO: 10.4 FL (ref 6–12)
POTASSIUM SERPL-SCNC: 3.9 MMOL/L (ref 3.5–5.2)
PROT SERPL-MCNC: 7.2 G/DL (ref 6–8.5)
PROTHROMBIN TIME: 33.9 SECONDS (ref 11.8–14.9)
RBC # BLD AUTO: 5.43 10*6/MM3 (ref 4.14–5.8)
SODIUM SERPL-SCNC: 138 MMOL/L (ref 136–145)
T4 FREE SERPL-MCNC: 1.21 NG/DL (ref 0.92–1.68)
TRIGL SERPL-MCNC: 98 MG/DL (ref 0–150)
TSH SERPL DL<=0.05 MIU/L-ACNC: 1.52 UIU/ML (ref 0.27–4.2)
VLDLC SERPL-MCNC: 18 MG/DL (ref 5–40)
WBC NRBC COR # BLD AUTO: 4.7 10*3/MM3 (ref 3.4–10.8)

## 2024-11-19 PROCEDURE — 85027 COMPLETE CBC AUTOMATED: CPT

## 2024-11-19 PROCEDURE — 80053 COMPREHEN METABOLIC PANEL: CPT

## 2024-11-19 PROCEDURE — 82043 UR ALBUMIN QUANTITATIVE: CPT

## 2024-11-19 PROCEDURE — 84443 ASSAY THYROID STIM HORMONE: CPT

## 2024-11-19 PROCEDURE — 83036 HEMOGLOBIN GLYCOSYLATED A1C: CPT

## 2024-11-19 PROCEDURE — 36415 COLL VENOUS BLD VENIPUNCTURE: CPT

## 2024-11-19 PROCEDURE — 80061 LIPID PANEL: CPT

## 2024-11-19 PROCEDURE — 82570 ASSAY OF URINE CREATININE: CPT

## 2024-11-19 PROCEDURE — 85610 PROTHROMBIN TIME: CPT

## 2024-11-19 PROCEDURE — 84439 ASSAY OF FREE THYROXINE: CPT

## 2024-11-20 ENCOUNTER — OFFICE VISIT (OUTPATIENT)
Dept: FAMILY MEDICINE CLINIC | Facility: CLINIC | Age: 54
End: 2024-11-20
Payer: OTHER GOVERNMENT

## 2024-11-20 ENCOUNTER — ANTICOAGULATION VISIT (OUTPATIENT)
Dept: CARDIOLOGY | Facility: CLINIC | Age: 54
End: 2024-11-20
Payer: OTHER GOVERNMENT

## 2024-11-20 VITALS
BODY MASS INDEX: 29.98 KG/M2 | DIASTOLIC BLOOD PRESSURE: 78 MMHG | OXYGEN SATURATION: 98 % | TEMPERATURE: 96.8 F | SYSTOLIC BLOOD PRESSURE: 126 MMHG | HEIGHT: 70 IN | HEART RATE: 90 BPM | WEIGHT: 209.4 LBS

## 2024-11-20 DIAGNOSIS — I10 PRIMARY HYPERTENSION: ICD-10-CM

## 2024-11-20 DIAGNOSIS — E11.9 TYPE 2 DIABETES MELLITUS WITHOUT COMPLICATION, WITHOUT LONG-TERM CURRENT USE OF INSULIN: Primary | ICD-10-CM

## 2024-11-20 DIAGNOSIS — Z95.2 HX OF MITRAL VALVE REPLACEMENT WITH MECHANICAL VALVE: Primary | ICD-10-CM

## 2024-11-20 DIAGNOSIS — Z12.5 SCREENING FOR PROSTATE CANCER: ICD-10-CM

## 2024-11-20 DIAGNOSIS — E78.5 HYPERLIPIDEMIA, UNSPECIFIED HYPERLIPIDEMIA TYPE: ICD-10-CM

## 2024-11-20 NOTE — PROGRESS NOTES
Lab Results   Component Value Date    INR 3.27 (H) 11/19/2024    INR 3.32 (H) 11/05/2024    INR 2.96 (H) 10/18/2024    PROTIME 33.9 (H) 11/19/2024    PROTIME 34.2 (H) 11/05/2024    PROTIME 31.3 (H) 10/18/2024     Hx of mitral valve replacement with mechanical valve     Range: 2.5-3.5    8mg    BH    Spoke with patient, patient is aware and verbalized understanding.

## 2024-11-20 NOTE — PROGRESS NOTES
"Chief Complaint  Diabetes (6 month follow up)    Subjective         Kevin Clifton presents to [facility]  Patient presents to the office for follow-up regarding his diabetes.  I did review recent lab work with him including his A1c of 5.7%.  Patient does state that he has not been on his Trulicity as the pharmacy cannot have this in stock.  We did discuss going back to [facility] as he tolerated this 1 very well.  Blood pressure is 126/78.  Denies any chest pain shortness breath palpitation at this time.  He denies needing any refills on his medications at this time.  I did explain we would continue to follow-up with him every 6 months due to him doing well on his current medication regimen.  Cardiology does manage his Coumadin dosages.    Diabetes         Objective     Vitals:    11/20/24 1522   BP: 126/78   BP Location: Right arm   Patient Position: Sitting   Cuff Size: Adult   Pulse: 90   Temp: 96.8 °F (36 °C)   TempSrc: Temporal   SpO2: 98%   Weight: 95 kg (209 lb 6.4 oz)   Height: 177.8 cm (70\")      Body mass index is 30.05 kg/m².             Physical Exam  Vitals reviewed.   Constitutional:       Appearance: Normal appearance.   Cardiovascular:      Rate and Rhythm: Normal rate and regular rhythm.      Pulses: Normal pulses.      Heart sounds: Normal heart sounds, S1 normal and S2 normal. No murmur heard.     Comments: Click noted with heart beat due to artificial valve  Pulmonary:      Effort: Pulmonary effort is normal. No respiratory distress.      Breath sounds: Normal breath sounds.   Skin:     General: Skin is warm and dry.   Neurological:      Mental Status: He is alert and oriented to person, place, and time.   Psychiatric:         Attention and Perception: Attention normal.         Mood and Affect: Mood normal.         Behavior: Behavior normal.          Result Review :   The following data was reviewed by: GABY Lion on " 11/20/2024:      Procedures    Assessment and Plan   Diagnoses and all orders for this visit:    1. Type 2 diabetes mellitus without complication, without long-term current use of insulin (Primary)  -     CBC (No Diff); Future  -     Comprehensive Metabolic Panel; Future  -     Hemoglobin A1c; Future  -     Lipid Panel; Future  -     Microalbumin / Creatinine Urine Ratio - Urine, Clean Catch; Future  -     TSH; Future  -     Semaglutide,0.25 or 0.5MG/DOS, (OZEMPIC) 2 MG/3ML solution pen-injector; Inject 0.25 mg under the skin into the appropriate area as directed 1 (One) Time Per Week.  Dispense: 3 mL; Refill: 2    2. Primary hypertension  -     CBC (No Diff); Future  -     Comprehensive Metabolic Panel; Future  -     Lipid Panel; Future    3. Hyperlipidemia, unspecified hyperlipidemia type  -     CBC (No Diff); Future  -     Comprehensive Metabolic Panel; Future  -     Lipid Panel; Future    4. Screening for prostate cancer  -     PSA Screen; Future          Follow Up   Return in about 6 months (around 5/20/2025).  Patient was given instructions and counseling regarding his condition or for health maintenance advice. Please see specific information pulled into the AVS if appropriate.

## (undated) DEVICE — LINER SURG CANSTR SXN S/RIGD 1500CC

## (undated) DEVICE — BLCK/BITE BLOX WO/DENTL/RIM W/STRAP 54F

## (undated) DEVICE — SNAR E/S POLYP SNAREMASTER OVL/10MM 2.8X2300MM YEL

## (undated) DEVICE — SOLIDIFIER LIQLOC PLS 1500CC BT

## (undated) DEVICE — Device: Brand: DEFENDO AIR/WATER/SUCTION AND BIOPSY VALVE

## (undated) DEVICE — THE SINGLE USE ETRAP – POLYP TRAP IS USED FOR SUCTION RETRIEVAL OF ENDOSCOPICALLY REMOVED POLYPS.: Brand: ETRAP

## (undated) DEVICE — CONN JET HYDRA H20 AUXILIARY DISP

## (undated) DEVICE — Device

## (undated) DEVICE — SINGLE-USE BIOPSY FORCEPS: Brand: RADIAL JAW 4

## (undated) DEVICE — SOL IRRG H2O PL/BG 1000ML STRL